# Patient Record
Sex: MALE | Race: WHITE | Employment: OTHER | URBAN - METROPOLITAN AREA
[De-identification: names, ages, dates, MRNs, and addresses within clinical notes are randomized per-mention and may not be internally consistent; named-entity substitution may affect disease eponyms.]

---

## 2017-02-06 ENCOUNTER — PROCEDURE VISIT (OUTPATIENT)
Dept: CARDIOLOGY CLINIC | Age: 77
End: 2017-02-06

## 2017-02-06 ENCOUNTER — OFFICE VISIT (OUTPATIENT)
Dept: CARDIOLOGY CLINIC | Age: 77
End: 2017-02-06

## 2017-02-06 VITALS
WEIGHT: 234 LBS | HEIGHT: 66 IN | SYSTOLIC BLOOD PRESSURE: 132 MMHG | DIASTOLIC BLOOD PRESSURE: 80 MMHG | HEART RATE: 80 BPM | BODY MASS INDEX: 37.61 KG/M2

## 2017-02-06 DIAGNOSIS — Z95.0 CARDIAC PACEMAKER IN SITU: Primary | ICD-10-CM

## 2017-02-06 DIAGNOSIS — I48.0 PAF (PAROXYSMAL ATRIAL FIBRILLATION) (HCC): Primary | ICD-10-CM

## 2017-02-06 PROCEDURE — 99214 OFFICE O/P EST MOD 30 MIN: CPT | Performed by: INTERNAL MEDICINE

## 2017-02-06 PROCEDURE — 93294 REM INTERROG EVL PM/LDLS PM: CPT | Performed by: INTERNAL MEDICINE

## 2017-02-06 PROCEDURE — 93000 ELECTROCARDIOGRAM COMPLETE: CPT | Performed by: INTERNAL MEDICINE

## 2017-02-06 PROCEDURE — 93296 REM INTERROG EVL PM/IDS: CPT | Performed by: INTERNAL MEDICINE

## 2017-02-06 RX ORDER — CYCLOBENZAPRINE HCL 10 MG
10 TABLET ORAL
COMMUNITY
End: 2018-04-27 | Stop reason: ALTCHOICE

## 2017-02-06 RX ORDER — LOSARTAN POTASSIUM 50 MG/1
50 TABLET ORAL DAILY
COMMUNITY

## 2017-02-06 RX ORDER — DEXLANSOPRAZOLE 60 MG/1
60 CAPSULE, DELAYED RELEASE ORAL DAILY
COMMUNITY

## 2017-03-24 ENCOUNTER — HOSPITAL ENCOUNTER (OUTPATIENT)
Dept: GENERAL RADIOLOGY | Age: 77
Discharge: HOME | End: 2017-03-24
Attending: FAMILY MEDICINE | Admitting: FAMILY MEDICINE

## 2017-03-24 LAB
ALBUMIN SERPL-MCNC: 4.2 GM/DL (ref 3.4–5)
ALP BLD-CCNC: 103 IU/L (ref 40–128)
ALT SERPL-CCNC: 26 U/L (ref 10–40)
ANION GAP SERPL CALCULATED.3IONS-SCNC: 10 MMOL/L (ref 4–16)
AST SERPL-CCNC: 29 IU/L (ref 15–37)
BASOPHILS ABSOLUTE: 0 K/CU MM
BASOPHILS RELATIVE PERCENT: 0.3 % (ref 0–1)
BILIRUB SERPL-MCNC: 0.2 MG/DL (ref 0–1)
BUN BLDV-MCNC: 28 MG/DL (ref 6–23)
CALCIUM SERPL-MCNC: 8.7 MG/DL (ref 8.3–10.6)
CHLORIDE BLD-SCNC: 104 MMOL/L (ref 99–110)
CHOLESTEROL: 121 MG/DL
CO2: 27 MMOL/L (ref 21–32)
CREAT SERPL-MCNC: 1.1 MG/DL (ref 0.9–1.3)
DIFFERENTIAL TYPE: ABNORMAL
EOSINOPHILS ABSOLUTE: 0.3 K/CU MM
EOSINOPHILS RELATIVE PERCENT: 4.3 % (ref 0–3)
GFR AFRICAN AMERICAN: >60 ML/MIN/1.73M2
GFR NON-AFRICAN AMERICAN: >60 ML/MIN/1.73M2
GLUCOSE BLD-MCNC: 104 MG/DL (ref 70–140)
HCT VFR BLD CALC: 34.9 % (ref 42–52)
HDLC SERPL-MCNC: 48 MG/DL
HEMOGLOBIN: 10.5 GM/DL (ref 13.5–18)
IMMATURE NEUTROPHIL %: 0.2 % (ref 0–0.43)
LDL CHOLESTEROL DIRECT: 67 MG/DL
LYMPHOCYTES ABSOLUTE: 1.9 K/CU MM
LYMPHOCYTES RELATIVE PERCENT: 29.3 % (ref 24–44)
MAGNESIUM: 2.3 MG/DL (ref 1.8–2.4)
MCH RBC QN AUTO: 26.1 PG (ref 27–31)
MCHC RBC AUTO-ENTMCNC: 30.1 % (ref 32–36)
MCV RBC AUTO: 86.6 FL (ref 78–100)
MONOCYTES ABSOLUTE: 0.6 K/CU MM
MONOCYTES RELATIVE PERCENT: 9.6 % (ref 0–4)
NUCLEATED RBC %: 0 %
PDW BLD-RTO: 16.1 % (ref 11.7–14.9)
PLATELET # BLD: 286 K/CU MM (ref 140–440)
PMV BLD AUTO: 10 FL (ref 7.5–11.1)
POTASSIUM SERPL-SCNC: 4.6 MMOL/L (ref 3.5–5.1)
RBC # BLD: 4.03 M/CU MM (ref 4.6–6.2)
SEGMENTED NEUTROPHILS ABSOLUTE COUNT: 3.7 K/CU MM
SEGMENTED NEUTROPHILS RELATIVE PERCENT: 56.3 % (ref 36–66)
SODIUM BLD-SCNC: 141 MMOL/L (ref 135–145)
TOTAL IMMATURE NEUTOROPHIL: 0.01 K/CU MM
TOTAL NUCLEATED RBC: 0 K/CU MM
TOTAL PROTEIN: 6.9 GM/DL (ref 6.4–8.2)
TRIGL SERPL-MCNC: 72 MG/DL
TSH HIGH SENSITIVITY: 0.93 UIU/ML (ref 0.27–4.2)
WBC # BLD: 6.5 K/CU MM (ref 4–10.5)

## 2017-04-19 ENCOUNTER — HOSPITAL ENCOUNTER (OUTPATIENT)
Dept: GENERAL RADIOLOGY | Age: 77
Discharge: HOME | End: 2017-04-19
Attending: FAMILY MEDICINE | Admitting: FAMILY MEDICINE

## 2017-04-19 LAB
FERRITIN: 21 NG/ML (ref 30–400)
IRON: 39 UG/DL (ref 59–158)
PCT TRANSFERRIN: 9 % (ref 10–44)
TOTAL IRON BINDING CAPACITY: 426 UG/DL (ref 250–450)
UNSATURATED IRON BINDING CAPACITY: 387 UG/DL (ref 110–370)
VITAMIN B-12: 503.3 PG/ML (ref 211–911)

## 2017-05-04 ENCOUNTER — TELEPHONE (OUTPATIENT)
Dept: CARDIOLOGY CLINIC | Age: 77
End: 2017-05-04

## 2017-05-05 ENCOUNTER — HOSPITAL ENCOUNTER (OUTPATIENT)
Dept: GENERAL RADIOLOGY | Age: 77
Discharge: HOME | End: 2017-05-05
Attending: ANESTHESIOLOGY | Admitting: ANESTHESIOLOGY

## 2017-05-05 LAB
ANION GAP SERPL CALCULATED.3IONS-SCNC: 13 MMOL/L (ref 4–16)
BUN BLDV-MCNC: 27 MG/DL (ref 6–23)
CALCIUM SERPL-MCNC: 8.8 MG/DL (ref 8.3–10.6)
CHLORIDE BLD-SCNC: 103 MMOL/L (ref 99–110)
CO2: 25 MMOL/L (ref 21–32)
CREAT SERPL-MCNC: 1.1 MG/DL (ref 0.9–1.3)
GFR AFRICAN AMERICAN: >60 ML/MIN/1.73M2
GFR NON-AFRICAN AMERICAN: >60 ML/MIN/1.73M2
GLUCOSE BLD-MCNC: 135 MG/DL (ref 70–140)
POTASSIUM SERPL-SCNC: 4.2 MMOL/L (ref 3.5–5.1)
SODIUM BLD-SCNC: 141 MMOL/L (ref 135–145)

## 2017-05-09 ENCOUNTER — TELEPHONE (OUTPATIENT)
Dept: CARDIOLOGY CLINIC | Age: 77
End: 2017-05-09

## 2017-05-15 ENCOUNTER — PROCEDURE VISIT (OUTPATIENT)
Dept: CARDIOLOGY CLINIC | Age: 77
End: 2017-05-15

## 2017-05-15 ENCOUNTER — TELEPHONE (OUTPATIENT)
Dept: CARDIOLOGY CLINIC | Age: 77
End: 2017-05-15

## 2017-05-15 DIAGNOSIS — Z95.0 CARDIAC PACEMAKER IN SITU: Primary | ICD-10-CM

## 2017-05-15 PROCEDURE — 93294 REM INTERROG EVL PM/LDLS PM: CPT | Performed by: INTERNAL MEDICINE

## 2017-05-15 PROCEDURE — 93296 REM INTERROG EVL PM/IDS: CPT | Performed by: INTERNAL MEDICINE

## 2017-08-21 ENCOUNTER — PROCEDURE VISIT (OUTPATIENT)
Dept: CARDIOLOGY CLINIC | Age: 77
End: 2017-08-21

## 2017-08-21 DIAGNOSIS — Z95.0 CARDIAC PACEMAKER IN SITU: Primary | ICD-10-CM

## 2017-08-21 PROCEDURE — 93294 REM INTERROG EVL PM/LDLS PM: CPT | Performed by: INTERNAL MEDICINE

## 2017-08-21 PROCEDURE — 93296 REM INTERROG EVL PM/IDS: CPT | Performed by: INTERNAL MEDICINE

## 2017-10-24 ENCOUNTER — OFFICE VISIT (OUTPATIENT)
Dept: CARDIOLOGY CLINIC | Age: 77
End: 2017-10-24

## 2017-10-24 VITALS
HEART RATE: 72 BPM | SYSTOLIC BLOOD PRESSURE: 134 MMHG | BODY MASS INDEX: 35.84 KG/M2 | HEIGHT: 66 IN | WEIGHT: 223 LBS | DIASTOLIC BLOOD PRESSURE: 72 MMHG

## 2017-10-24 DIAGNOSIS — I50.9 CONGESTIVE HEART FAILURE, UNSPECIFIED CONGESTIVE HEART FAILURE CHRONICITY, UNSPECIFIED CONGESTIVE HEART FAILURE TYPE: ICD-10-CM

## 2017-10-24 DIAGNOSIS — I48.0 PAF (PAROXYSMAL ATRIAL FIBRILLATION) (HCC): ICD-10-CM

## 2017-10-24 DIAGNOSIS — Z86.79 S/P ABLATION OF ATRIAL FIBRILLATION: ICD-10-CM

## 2017-10-24 DIAGNOSIS — Z98.890 S/P ABLATION OF ATRIAL FIBRILLATION: ICD-10-CM

## 2017-10-24 DIAGNOSIS — I25.118 CORONARY ARTERY DISEASE OF NATIVE HEART WITH STABLE ANGINA PECTORIS, UNSPECIFIED VESSEL OR LESION TYPE (HCC): ICD-10-CM

## 2017-10-24 DIAGNOSIS — Z95.0 PACEMAKER: ICD-10-CM

## 2017-10-24 DIAGNOSIS — I10 HYPERTENSION, UNSPECIFIED TYPE: ICD-10-CM

## 2017-10-24 DIAGNOSIS — I48.0 PAROXYSMAL ATRIAL FIBRILLATION (HCC): ICD-10-CM

## 2017-10-24 DIAGNOSIS — Z86.79 S/P ABLATION OF ATRIAL FLUTTER: ICD-10-CM

## 2017-10-24 DIAGNOSIS — Z98.890 S/P ABLATION OF ATRIAL FLUTTER: ICD-10-CM

## 2017-10-24 DIAGNOSIS — Z98.61 HISTORY OF PTCA: Primary | ICD-10-CM

## 2017-10-24 DIAGNOSIS — I49.5 SSS (SICK SINUS SYNDROME) (HCC): ICD-10-CM

## 2017-10-24 DIAGNOSIS — R55 SYNCOPE AND COLLAPSE: ICD-10-CM

## 2017-10-24 PROCEDURE — 99214 OFFICE O/P EST MOD 30 MIN: CPT | Performed by: INTERNAL MEDICINE

## 2017-10-24 NOTE — PROGRESS NOTES
CARDIOLOGY  NOTE    Chief Complaint: recurrent syncope    HPI:   Maryan Valadez is a 65y.o. year old who has history as noted below. Patient who follows with Dr Debra Blanco comes for urgent visit due to recurrent falls and syncope. Last event occurred 2 days back , It was unwitnessed he says he has been passing out for years. He says he cannot remember what happens. HE is is more short of breath,. He thinks he passes out. He denies any chest pain but has fluttering feeling in stomach often. Leg and ankle swelling is worse for few weeks now. HE does not feel light headed when he stands up       Current Outpatient Prescriptions   Medication Sig Dispense Refill    rOPINIRole (REQUIP) 0.5 MG tablet Take 1 tablet by mouth daily 90 tablet 3    losartan (COZAAR) 50 MG tablet Take 50 mg by mouth daily       cyclobenzaprine (FLEXERIL) 10 MG tablet Take 10 mg by mouth      dexlansoprazole (DEXILANT) 60 MG CPDR delayed release capsule Take 60 mg by mouth daily      albuterol sulfate  (90 BASE) MCG/ACT inhaler Inhale 2 puffs into the lungs every 6 hours as needed for Wheezing 1 Inhaler 5    Spacer/Aero-Holding Chambers (AEROCHAMBER) MISC Inhale 1 Device into the lungs every 6 hours 1 each 0    zolpidem (AMBIEN) 10 MG tablet Take by mouth nightly as needed for Sleep      furosemide (LASIX) 40 MG tablet Take 1 tablet by mouth 2 times daily 180 tablet 3    dronedarone hcl (MULTAQ) 400 MG TABS Take 400 mg by mouth 2 times daily (with meals)      nitroGLYCERIN (NITROLINGUAL) 0.4 MG/SPRAY 0.4 mg spray Place 1 spray under the tongue every 5 minutes as needed for Chest pain      CPAP Machine MISC by Does not apply route nightly      apixaban (ELIQUIS) 5 MG TABS tablet Take 1 tablet by mouth 2 times daily 60 tablet 3    tadalafil (CIALIS) 5 MG tablet Take 1 tablet by mouth as needed for Erectile Dysfunction 30 tablet 0    rosuvastatin (CRESTOR) 20 MG tablet Take 20 mg by mouth daily.  pramipexole (MIRAPEX) 1 MG tablet   Take 0.5 mg by mouth 2 times daily Patient takes 1/2 tablet in the am and 1 1/2 tablet at night.  metoprolol (LOPRESSOR) 50 MG tablet Take 50 mg by mouth 2 times daily.  modafinil (PROVIGIL) 100 MG tablet Take 100 mg by mouth daily.  esomeprazole (NEXIUM) 40 MG capsule Take 40 mg by mouth 2 times daily.  aspirin 81 MG chewable tablet Take 81 mg by mouth daily.  diclofenac-misoprostol (ARTHROTEC 75) 75-0.2 MG per tablet Take 1 tablet by mouth 2 times daily. No current facility-administered medications for this visit. Allergies:   Ceclor [cefaclor]; Celebrex [celecoxib]; Pcn [penicillins]; and Relafen [nabumetone]    Patient History:  Past Medical History:   Diagnosis Date    Aortic regurgitation 4/3/2012    4/3/2012-mild; 2/7/2011-mild to moderate    Arrhythmia 2/22/2011 2/22/2011-tachycardia episode during cardiac cath-48 HR Holter monitor normal    Blisters of multiple sites 10/5/2016    CAD (coronary artery disease)     sees Dr Allan Trent, prior Dr Antoine Velascoing;   [employer] Cardiac pacemaker 4/2001 4/2001-[employer] PPM Integrity DDD Model 5342    Congestive heart failure ([address]     Family history of coronary artery disease     Father-MI    GERD (gastroesophageal reflux disease)     H/O cardiac catheterization 5/2/2012, 2/22/2011, 3/4/2010, 2/2001 5/2/2012-[facility]-No evid of signif CAD; Normal peripheral angiogram; 2/22/2011-No signif CAD, stent patent, had tachycardic event; 3/4/2010-Severe stenosis and re in-stent stenosis of LAD    H/O cardiovascular stress test 4/16/2012, 2/7/2011 4/16/2012-Normal Lexiscan study. No evid of ischemia. Attenuation artifact in inferior region of myocardium. EF 58%.      H/O cardiovascular stress test 3/4/2015    normal, no ischemia, EF62%    H/O Doppler ultrasound 2/7/2011 2/7/2011-CAROTID US-Normal    H/O Doppler ultrasound 5/24/2013    periphal normal    H/O Doppler ultrasound 6/18/14 6/14 Normal    H/O Doppler ultrasound 11/13/15 09/03/15    11/15 WNL 9/15Arterial and Venous Doppler is normal.     H/O echocardiogram 03/04/15    EF60% Normal LV and systolic function. Trace MR, Aortic, and TR. No other valvulopathy.  H/O echocardiogram 08/08/2016    EF60% mild AR, mild phtn    H/O peptic ulcer 1970s    Hiatal hernia     History of cardiac monitoring 6/1/15    Event - Patient noted to have recurrence of atrial fibrillation with RVR on medical therapy on event monitor    History of Holter monitoring 5/19/15    24 hour - predominant rhythm was sinus intermittent paced, paced rhythm can not be ruled out d/t pacemaker function being turned off during device setup, limited pacemaker data available    History of nuclear stress test 11/10/2016    lexiscan-normal,EF70%    History of PTCA     HX OTHER MEDICAL 5/2/2012 5/2/2012-PERIPHERAL ANGIOGRAM-Normal-Dr Agueda Carroll    HX OTHER MEDICAL 3/2/2011    3/2/2011-48 HR HOLTER MONITOR-Pred Rhythm is sinus rhythm. No arrhythmias.     Hyperlipidemia     Hypertension     S/P angioplasty with stent 3/4/2010, 2/2001    3/4/2010-PTCA with 2.75 X 38 mm stent to LAD; 2/2001-PTCA with stent to LAD    S/P cardiac pacemaker procedure 6/04/13    battery replacement    Sleep apnea     has CPAP     Past Surgical History:   Procedure Laterality Date    ATRIAL ABLATION SURGERY  8/11/15    Atrial fib/flutter ablation     CARDIAC CATHETERIZATION  06/14/2015    no significant disease    CARDIAC PACEMAKER PLACEMENT  4/2001 4/2001-[employer] PPM Integrity DDD Model 5342    CARPAL TUNNEL RELEASE Bilateral 1976    CORONARY ANGIOPLASTY WITH STENT PLACEMENT  3/4/2010, 2/2001    3/4/2010-PTCA with 2.75 X 38 mm stent to LAD; 2/2001-PTCA with stent to LAD;    DIAGNOSTIC CARDIAC CATH LAB PROCEDURE  5/2/2012,2/22/2011, 3/4/2010, 2/2001 5/2/2012-[facility]-No evid of signif CAD; Normal peripheral angiogram; 2/22/2011-No signif CAD, stent patent, had tachycardic event; condition listed as is. Return in about 1 month (around 11/24/2017).

## 2017-10-24 NOTE — LETTER
[place] [facility] [place]     Dr. Fredrick Gonzalez     Tilt Table Procedure     Patient Name: Humberto Bansal   : 1940  MRN# [id number]     Date of Procedure: 10/27/17 Time: 6:30 Arrival Time: 5:45     Hospital: [facility])     Call to [facility] at [phone] 2 days before your procedure    X Please do not have anything by mouth after midnight prior to or 8 hours before  the procedure. X You will need to arrive at the hospital 1 hour prior to the procedure. When you arrive  at the hospital please go to the registration desk. X You will need to arrange for someone to drive you home after the procedure. X Please wear comfortable clothing for the procedure. X If you are taking Lasix (furosemide) please do not take  it the morning before your  procedure. Patient Signature:____________________________ Staff Signature: Adalid Gonzalez      Tilt Table Test   A tilt table test is used to check people who have fainted or who often feel lightheaded. The results help your doctor know the cause of your fainting or feeling lightheaded. The test uses a special table that slowly tilts you to an upright position. It checks how your body responds when you change positions. The test will take about an hour. It may take longer if you get medicine to speed up your heart during the test.  Why is this test done? This test checks what causes your symptoms by monitoring them while changing your position. Your doctor can see if you faint or feel lightheaded because of problems with your heart rate or blood pressure. When people move from a lying position to an upright one, their blood pressure normally drops. But the body adjusts to this. Your nervous system senses changes in body position and controls your heart rate and blood pressure. If the nervous system doesn't work properly, you might feel lightheaded or faint. This can happen if your blood pressure stays too low. Your heart rate also may slow down or speed up. You feel lightheaded because your brain is not getting a normal amount of blood for a short time. This problem is called syncope. Syncope might happen during the test when you change to an upright position. During the Test: The test is usually done in a hospital. You will have small patches or pads attached to your skin. These are sensors that monitor your heart. You will also have a blood pressure cuff on your arm. And you may have an IV. You will lie flat on a table that can tilt you up to almost a standing position. You will be strapped securely to the table. Your heart rate and blood pressure are checked regularly as the table is tilted up. You will be asked if you feel any symptoms like nausea, sweating, dizziness, or an abnormal heartbeat. If you don't have any symptoms, you may be given medicine to speed up your heart rate. Then you will be checked for symptoms again. If you faint during the test, the table will be returned to a flat position. You will be checked closely and taken care of right away by your medical team. Most people wake up right away. Results of the test: The test result is normal if your blood pressure stays stable during the test and you do not feel lightheaded or faint. The test result is not normal if your blood pressure drops and you feel lightheaded or faint. These symptoms might happen because of a slow heart rate. After the Test: Your heart rate and blood pressure will be checked before you go home. You may need to have someone drive you home after the test. You can probably go back to your usual activities right away. But some people feel a little tired or nauseated  Follow-up care is a key part of your treatment and safety.  Be sure to make and go to all appointments, and call your doctor if you are having problems. It's also a good idea to keep a list of the medicines you take. Ask your doctor when you can expect to have your test results. [place] [facility] [place]      [address] [facility]/CARDIOLOGY            Patient Name: Yocasta Wolfe   : 1940  MRN# [id number]    TODAYS DATE: 10/24/17    DATE OF PROCEDURE: 10/27/17      DX: syncope             Tilt Table Procedure           X BMP        X MAGNESIUM                 PHYSICIANS SIGNATURE________________________DATE/TIME___________________                                       [facility]) Informed Consent for Anesthesia/Sedation, Surgery, Invasive Procedures, and other High-risk Interventions and Medication use      *This consent is applicable for 30 days following patient signature*    Procedure(s)   I, Yocasta Wolfe authorize, Dr. Gabo Lyon    and the associate(s) or assistant(s) of his/her choice, to perform the following procedure(s):Tilt Table Procedure    I know that unexpected conditions may require additional or different procedures than those above. I authorize the above named practitioner(s) perform these as necessary and desirable. This is based on the practitioners professional judgment. The above named practitioner has discussed the above procedure(s) with me, including:  ? Potential benefits, including likelihood of success of the procedure(s) goals  ? Risks  ? Side effects, risk of death, and risk of infection  ? Any potential problems that might occur during recuperation or healing post-procedure  ? Reasonable alternatives  ? Risks of NOT performing the procedure(s)    I acknowledge that no warranty or guarantee has been made to the results the procedure(s).      I consent to the above named practitioner(s) providing additional services to me as deemed reasonable and necessary, including but not limited to: ? Use of medications for anesthesia or sedation. ? All anesthesia and sedation carry risks. My practitioner has discussed my anticipated anesthesia and/or sedation and the risks of using, risk of not using, benefits, side effects, and alternatives. ? Use of pathology  ? I authorize [facility] ([facility]) to dispose of tissues, specimens or organs when pathology is complete. ? Use of radiology  ? A contrast agent may be required for radiology procedures. My practitioner has advised me of the risks of using, risks of not using, benefits, side effects, and alternatives. ? Observers or use of photography, video/audio recording, or televising of the procedure(s). This is for medical, scientific, or educational purposes. This includes appropriate portions of my body. My identity will not be revealed. ? I consent to release of my social security number and other identifying information to [employer] (FDA), and the supplier/, if I receive tissue, a device, or implant. This is to track the tissue, device, or implant for defect, recall, infection, etc.     ? Use of blood and/or blood products, if needed, through my hospital stay. My practitioner has advised me of the risks of using, risks of not using, benefits, side effects, and alternatives. ___ I do NOT want Blood or Blood products given. (Complete separate  refusal form)    Code Status (jhon one):  ___ I do NOT HAVE a DNR order. I am a Full-code.   I will receive CPR, intubation,  chest compressions, medications, and/or other life saving measures if I have a  cardiac or respiratory arrest.    ___ I have a Do Not Resuscitate (DNR)order.   (jhon one below)  ___  I rescind my DNR for surgery and immediate post-operative period through Phase 2 recovery.    This means, for that time period, I will be a Full-code and receive CPR, intubation, chest compressions, medications, and/or other life saving measures, if I have a cardiac or respiratory arrest.    ___ I WANT to keep my DNR in effect during my procedure(s) and immediate post-operative recovery period through Phase 2 recovery. (Complete separate refusal form)     This form has been fully explained to me. I understand its contents. Patients Signature: ___________________________Date: ________  Time: ________    If patient unable to sign, has engaged the [address] [place] [address], is a minor, or has a court-appointed Guardian:  [facility] Representative Name (Print):  ____________________________________      Relationship ([place] one):    Guardian   Parent    Spouse    HCPOA   Child   Sibling  Next-of-Kin Friend    Patients Representative Signature: _______________________________________              Date: ______________  Time: __________    An  was used.  name/ID: _________________________________      [facility] ([facility]) Witness________________________  Date: ________   Time: _________    Physician/Practitioner _______________________  Date: ________   Time: _________           Revision 2017    Darryle Cocks Dr. Linard Christian     PROCEDURE TO SCHEDULE:    Tilt Table Procedure    Patient Name: Bharti Jeffery   : 1940   MRN# [id number]  Home Phone Number: [phone]   Weight:    Wt Readings from Last 3 Encounters:   10/24/17 223 lb (101.2 kg)   17 219 lb (99.3 kg)   17 225 lb (102.1 kg)        Insurance: Payor: Manuela Kim / Plan: Juanito March PPO / Product Type: Medicare /     Date of Procedure: 10/27/17 Time: 6:30 Arrival Time: 5:45    Diagnosis:  syncope  Allergies:    Allergies   Allergen Reactions    Ceclor [Cefaclor] Rash    Celebrex [Celecoxib] Rash    Pcn [Penicillins] Rash    Relafen [Nabumetone] Rash          1) Call [facility] scheduling ([phone]) or [address] [facility]     PHONE OR   INSTANT MESSAGE

## 2017-10-27 ENCOUNTER — TELEPHONE (OUTPATIENT)
Dept: CARDIOLOGY CLINIC | Age: 77
End: 2017-10-27

## 2017-10-30 ENCOUNTER — HOSPITAL ENCOUNTER (OUTPATIENT)
Dept: CARDIOLOGY | Age: 77
Discharge: HOME | End: 2017-10-30
Attending: INTERNAL MEDICINE | Admitting: INTERNAL MEDICINE

## 2017-10-30 VITALS
DIASTOLIC BLOOD PRESSURE: 74 MMHG | TEMPERATURE: 98.3 F | RESPIRATION RATE: 19 BRPM | OXYGEN SATURATION: 93 % | SYSTOLIC BLOOD PRESSURE: 148 MMHG | HEART RATE: 128 BPM

## 2017-10-30 LAB
ANION GAP SERPL CALCULATED.3IONS-SCNC: 10 MMOL/L (ref 4–16)
BUN BLDV-MCNC: 34 MG/DL (ref 6–23)
CALCIUM SERPL-MCNC: 8.9 MG/DL (ref 8.3–10.6)
CHLORIDE BLD-SCNC: 104 MMOL/L (ref 99–110)
CO2: 29 MMOL/L (ref 21–32)
CREAT SERPL-MCNC: 1.3 MG/DL (ref 0.9–1.3)
GFR AFRICAN AMERICAN: >60 ML/MIN/1.73M2
GFR NON-AFRICAN AMERICAN: 53 ML/MIN/1.73M2
GLUCOSE FASTING: 116 MG/DL (ref 70–99)
MAGNESIUM: 2.3 MG/DL (ref 1.8–2.4)
POTASSIUM SERPL-SCNC: 4.9 MMOL/L (ref 3.5–5.1)
SODIUM BLD-SCNC: 143 MMOL/L (ref 135–145)

## 2017-10-30 RX ORDER — METAXALONE 800 MG/1
800 TABLET ORAL 2 TIMES DAILY
COMMUNITY
End: 2018-09-14 | Stop reason: ALTCHOICE

## 2017-10-30 NOTE — PROGRESS NOTES
Take 81 mg by mouth daily. Yes Historical Provider, MD   diclofenac-misoprostol (ARTHROTEC 75) 75-0.2 MG per tablet Take 1 tablet by mouth 2 times daily. Yes Historical Provider, MD   rOPINIRole (REQUIP) 0.5 MG tablet Take 1 tablet by mouth daily  Candice Orozco MD   losartan (COZAAR) 50 MG tablet Take 50 mg by mouth daily   Historical Provider, MD   cyclobenzaprine (FLEXERIL) 10 MG tablet Take 10 mg by mouth  Historical Provider, MD   Spacer/Aero-Holding Chambers (AEROCHAMBER) MISC Inhale 1 Device into the lungs every 6 hours  Candice Orozco MD   nitroGLYCERIN (NITROLINGUAL) 0.4 MG/SPRAY 0.4 mg spray Place 1 spray under the tongue every 5 minutes as needed for Chest pain  Historical Provider, MD         Past Medical History:   Diagnosis Date    Aortic regurgitation 4/3/2012    4/3/2012-mild; 2/7/2011-mild to moderate    Arrhythmia 2/22/2011 2/22/2011-tachycardia episode during cardiac cath-48 HR Holter monitor normal    Blisters of multiple sites 10/5/2016    CAD (coronary artery disease)     sees Dr Cortney Meléndez, prior Dr Jared Max;   [facility] Cardiac pacemaker 4/2001 4/2001-[employer] PPM Integrity DDD Model 5342    Congestive heart failure ([address]     Family history of coronary artery disease     Father-MI    GERD (gastroesophageal reflux disease)     H/O cardiac catheterization 5/2/2012, 2/22/2011, 3/4/2010, 2/2001 5/2/2012-[facility]-No evid of signif CAD; Normal peripheral angiogram; 2/22/2011-No signif CAD, stent patent, had tachycardic event; 3/4/2010-Severe stenosis and re in-stent stenosis of LAD    H/O cardiovascular stress test 4/16/2012, 2/7/2011 4/16/2012-Normal Lexiscan study. No evid of ischemia. Attenuation artifact in inferior region of myocardium. EF 58%.      H/O cardiovascular stress test 3/4/2015    normal, no ischemia, EF62%    H/O Doppler ultrasound 2/7/2011 2/7/2011-CAROTID US-Normal    H/O Doppler ultrasound 5/24/2013    periphal normal    H/O Doppler ultrasound 6/18/14 6/14 Normal    H/O Doppler ultrasound 11/13/15 09/03/15    11/15 WNL 9/15Arterial and Venous Doppler is normal.     H/O echocardiogram 03/04/15    EF60% Normal LV and systolic function. Trace MR, Aortic, and TR. No other valvulopathy.  H/O echocardiogram 08/08/2016    EF60% mild AR, mild phtn    H/O peptic ulcer 1970s    Hiatal hernia     History of cardiac monitoring 6/1/15    Event - Patient noted to have recurrence of atrial fibrillation with RVR on medical therapy on event monitor    History of Holter monitoring 5/19/15    24 hour - predominant rhythm was sinus intermittent paced, paced rhythm can not be ruled out d/t pacemaker function being turned off during device setup, limited pacemaker data available    History of nuclear stress test 11/10/2016    lexiscan-normal,EF70%    History of PTCA     HX OTHER MEDICAL 5/2/2012 5/2/2012-PERIPHERAL ANGIOGRAM-Normal-Dr Mary Jane Armas    HX OTHER MEDICAL 3/2/2011    3/2/2011-48 HR HOLTER MONITOR-Pred Rhythm is sinus rhythm. No arrhythmias.     Hyperlipidemia     Hypertension     S/P angioplasty with stent 3/4/2010, 2/2001    3/4/2010-PTCA with 2.75 X 38 mm stent to LAD; 2/2001-PTCA with stent to LAD    S/P cardiac pacemaker procedure 6/04/13    battery replacement    Sleep apnea     has CPAP       Past Surgical History:   Procedure Laterality Date    ATRIAL ABLATION SURGERY  8/11/15    Atrial fib/flutter ablation     CARDIAC CATHETERIZATION  06/14/2015    no significant disease    CARDIAC PACEMAKER PLACEMENT  4/2001 4/2001-[employer] PPM Integrity DDD Model 5342    CARPAL TUNNEL RELEASE Bilateral 1976    CORONARY ANGIOPLASTY WITH STENT PLACEMENT  3/4/2010, 2/2001    3/4/2010-PTCA with 2.75 X 38 mm stent to LAD; 2/2001-PTCA with stent to LAD;    DIAGNOSTIC CARDIAC CATH LAB PROCEDURE  5/2/2012,2/22/2011, 3/4/2010, 2/2001 5/2/2012-[facility]-No evid of signif CAD; Normal peripheral angiogram; 2/22/2011-No signif CAD, stent patent, had tachycardic event;

## 2017-11-01 ENCOUNTER — PROCEDURE VISIT (OUTPATIENT)
Dept: CARDIOLOGY CLINIC | Age: 77
End: 2017-11-01

## 2017-11-01 DIAGNOSIS — R06.02 SOB (SHORTNESS OF BREATH): Primary | ICD-10-CM

## 2017-11-01 DIAGNOSIS — I25.118 CORONARY ARTERY DISEASE OF NATIVE HEART WITH STABLE ANGINA PECTORIS, UNSPECIFIED VESSEL OR LESION TYPE (HCC): ICD-10-CM

## 2017-11-01 DIAGNOSIS — Z98.890 S/P ABLATION OF ATRIAL FLUTTER: ICD-10-CM

## 2017-11-01 DIAGNOSIS — Z86.79 S/P ABLATION OF ATRIAL FLUTTER: ICD-10-CM

## 2017-11-01 DIAGNOSIS — I10 HYPERTENSION, UNSPECIFIED TYPE: ICD-10-CM

## 2017-11-01 DIAGNOSIS — I48.0 PAF (PAROXYSMAL ATRIAL FIBRILLATION) (HCC): ICD-10-CM

## 2017-11-01 DIAGNOSIS — I50.9 CONGESTIVE HEART FAILURE, UNSPECIFIED CONGESTIVE HEART FAILURE CHRONICITY, UNSPECIFIED CONGESTIVE HEART FAILURE TYPE: ICD-10-CM

## 2017-11-01 DIAGNOSIS — Z95.0 PACEMAKER: ICD-10-CM

## 2017-11-01 DIAGNOSIS — Z98.61 HISTORY OF PTCA: ICD-10-CM

## 2017-11-01 DIAGNOSIS — I49.5 SSS (SICK SINUS SYNDROME) (HCC): ICD-10-CM

## 2017-11-01 DIAGNOSIS — I48.0 PAROXYSMAL ATRIAL FIBRILLATION (HCC): ICD-10-CM

## 2017-11-01 LAB
LV EF: 58 %
LVEF MODALITY: NORMAL

## 2017-11-01 PROCEDURE — 93306 TTE W/DOPPLER COMPLETE: CPT | Performed by: INTERNAL MEDICINE

## 2017-11-02 ENCOUNTER — TELEPHONE (OUTPATIENT)
Dept: CARDIOLOGY CLINIC | Age: 77
End: 2017-11-02

## 2017-11-02 NOTE — TELEPHONE ENCOUNTER
Left ventricular systolic function is normal with an ejection fraction of   55-60%.  Grade I diastolic dysfunction.   The left atrium is mildly dilated.   Mildly sclerotic , but non-stenotic aortic valve.   Mild aortic regurgitation.   No other significant valvulopathy seen.   Right ventricular systolic pressure of 40 mmHg consistent with mild   pulmonary hypertension.   No evidence of pericardial effusion.     Results to patient

## 2017-11-27 ENCOUNTER — TELEPHONE (OUTPATIENT)
Dept: CARDIOLOGY CLINIC | Age: 77
End: 2017-11-27

## 2017-11-27 ENCOUNTER — PROCEDURE VISIT (OUTPATIENT)
Dept: CARDIOLOGY CLINIC | Age: 77
End: 2017-11-27

## 2017-11-27 DIAGNOSIS — Z95.0 CARDIAC PACEMAKER IN SITU: Primary | ICD-10-CM

## 2017-11-27 PROCEDURE — 93296 REM INTERROG EVL PM/IDS: CPT | Performed by: INTERNAL MEDICINE

## 2017-11-27 PROCEDURE — 93294 REM INTERROG EVL PM/LDLS PM: CPT | Performed by: INTERNAL MEDICINE

## 2017-11-27 NOTE — LETTER
Cardiology [address] [address]  Phone: [phone]  Fax: [phone]            November 27, 2017    [address]      Dear Gustavo Ty: This is your CAREL[employer] schedule. Please jhon your calendar with these dates. You can do your checks anytime during the scheduled day. Since we do not do reminder calls, it will be your responsibility to perform the checks on the day it is scheduled. If you have any questions or concerns, please call and ask for Etelvina Alonso  at [phone].

## 2017-11-30 ENCOUNTER — OFFICE VISIT (OUTPATIENT)
Dept: CARDIOLOGY CLINIC | Age: 77
End: 2017-11-30

## 2017-11-30 VITALS
DIASTOLIC BLOOD PRESSURE: 70 MMHG | HEIGHT: 66 IN | BODY MASS INDEX: 34.97 KG/M2 | WEIGHT: 217.6 LBS | HEART RATE: 80 BPM | SYSTOLIC BLOOD PRESSURE: 140 MMHG

## 2017-11-30 DIAGNOSIS — I25.118 CORONARY ARTERY DISEASE OF NATIVE HEART WITH STABLE ANGINA PECTORIS, UNSPECIFIED VESSEL OR LESION TYPE (HCC): ICD-10-CM

## 2017-11-30 DIAGNOSIS — Z98.61 HISTORY OF PTCA: Primary | ICD-10-CM

## 2017-11-30 PROCEDURE — 99214 OFFICE O/P EST MOD 30 MIN: CPT | Performed by: INTERNAL MEDICINE

## 2017-11-30 NOTE — PROGRESS NOTES
CARDIOLOGY NOTE      11/30/2017    RE: Sophia Velazquez  (1940)                               TO:  Dr. Haleigh Pope MD      Thank you for involving me in taking care of your  patient Sophia Velazquez, who is a  65y.o. year old      male with past medical  history of  Cad,htn,hyperlipidimea, ppm, a fib  is  seen today Patient  during this  visit c/o severe exertional sob for  Afew weeks, no CP. Has been having frq falls aswell. Here for fu on tilt test    Vitals:    11/30/17 1652   BP: (!) 140/70   Pulse:        Current Outpatient Prescriptions   Medication Sig Dispense Refill    metaxalone (SKELAXIN) 800 MG tablet Take 800 mg by mouth 2 times daily      rOPINIRole (REQUIP) 0.5 MG tablet Take 1 tablet by mouth daily 90 tablet 3    losartan (COZAAR) 50 MG tablet Take 50 mg by mouth daily       cyclobenzaprine (FLEXERIL) 10 MG tablet Take 10 mg by mouth      dexlansoprazole (DEXILANT) 60 MG CPDR delayed release capsule Take 60 mg by mouth daily      albuterol sulfate  (90 BASE) MCG/ACT inhaler Inhale 2 puffs into the lungs every 6 hours as needed for Wheezing 1 Inhaler 5    Spacer/Aero-Holding Chambers (AEROCHAMBER) MISC Inhale 1 Device into the lungs every 6 hours 1 each 0    zolpidem (AMBIEN) 10 MG tablet Take by mouth nightly as needed for Sleep      furosemide (LASIX) 40 MG tablet Take 1 tablet by mouth 2 times daily 180 tablet 3    dronedarone hcl (MULTAQ) 400 MG TABS Take 400 mg by mouth 2 times daily (with meals)      nitroGLYCERIN (NITROLINGUAL) 0.4 MG/SPRAY 0.4 mg spray Place 1 spray under the tongue every 5 minutes as needed for Chest pain      CPAP Machine MISC by Does not apply route nightly      apixaban (ELIQUIS) 5 MG TABS tablet Take 1 tablet by mouth 2 times daily 60 tablet 3    tadalafil (CIALIS) 5 MG tablet Take 1 tablet by mouth as needed for Erectile Dysfunction 30 tablet 0    rosuvastatin (CRESTOR) 20 MG tablet Take 20 mg by mouth daily.       pramipexole (MIRAPEX) 1 MG tablet   Take 0.5 mg by mouth 2 times daily Patient takes 1/2 tablet in the am and 1 1/2 tablet at night.  metoprolol (LOPRESSOR) 50 MG tablet Take 50 mg by mouth 2 times daily.  modafinil (PROVIGIL) 100 MG tablet Take 100 mg by mouth daily.  esomeprazole (NEXIUM) 40 MG capsule Take 40 mg by mouth 2 times daily.  aspirin 81 MG chewable tablet Take 81 mg by mouth daily.  diclofenac-misoprostol (ARTHROTEC 75) 75-0.2 MG per tablet Take 1 tablet by mouth 2 times daily. No current facility-administered medications for this visit. Allergies: Ceclor [cefaclor]; Celebrex [celecoxib]; Pcn [penicillins]; and Relafen [nabumetone]  Past Medical History:   Diagnosis Date    Aortic regurgitation 4/3/2012    4/3/2012-mild; 2/7/2011-mild to moderate    Arrhythmia 2/22/2011 2/22/2011-tachycardia episode during cardiac cath-48 HR Holter monitor normal    Blisters of multiple sites 10/5/2016    CAD (coronary artery disease)     sees Dr Kyle Lang, prior Dr Daniel Renteria;   Ludin Samuels Cardiac pacemaker 4/2001 4/2001-[employer] PPM Integrity DDD Model 5342    Congestive heart failure [facility])     Family history of coronary artery disease     Father-MI    GERD (gastroesophageal reflux disease)     H/O cardiac catheterization 5/2/2012, 2/22/2011, 3/4/2010, 2/2001 5/2/2012-[facility]-No evid of signif CAD; Normal peripheral angiogram; 2/22/2011-No signif CAD, stent patent, had tachycardic event; 3/4/2010-Severe stenosis and re in-stent stenosis of LAD    H/O cardiovascular stress test 4/16/2012, 2/7/2011 4/16/2012-Normal Lexiscan study. No evid of ischemia. Attenuation artifact in inferior region of myocardium. EF 58%.      H/O cardiovascular stress test 3/4/2015    normal, no ischemia, EF62%    H/O Doppler ultrasound 2/7/2011 2/7/2011-CAROTID US-Normal    H/O Doppler ultrasound 5/24/2013    periphal normal    H/O Doppler ultrasound 6/18/14 6/14 Normal    H/O Doppler ultrasound 11/13/15 09/03/15    11/15 WNL 9/15Arterial and Venous Doppler is normal.     H/O echocardiogram 03/04/15    EF60% Normal LV and systolic function. Trace MR, Aortic, and TR. No other valvulopathy.  H/O echocardiogram 08/08/2016    EF60% mild AR, mild phtn    H/O echocardiogram 11/01/2017    EF55-60% sclerotic, non stenotic AV, mild AR, phtn    H/O peptic ulcer 1970s    Hiatal hernia     History of cardiac monitoring 6/1/15    Event - Patient noted to have recurrence of atrial fibrillation with RVR on medical therapy on event monitor    History of Holter monitoring 5/19/15    24 hour - predominant rhythm was sinus intermittent paced, paced rhythm can not be ruled out d/t pacemaker function being turned off during device setup, limited pacemaker data available    History of nuclear stress test 11/10/2016    lexiscan-normal,EF70%    History of PTCA     HX OTHER MEDICAL 5/2/2012 5/2/2012-PERIPHERAL ANGIOGRAM-Normal-Dr France Avery    HX OTHER MEDICAL 3/2/2011    3/2/2011-48 HR HOLTER MONITOR-Pred Rhythm is sinus rhythm. No arrhythmias.     Hyperlipidemia     Hypertension     S/P angioplasty with stent 3/4/2010, 2/2001    3/4/2010-PTCA with 2.75 X 38 mm stent to LAD; 2/2001-PTCA with stent to LAD    S/P cardiac pacemaker procedure 6/04/13    battery replacement    Sleep apnea     has CPAP     Past Surgical History:   Procedure Laterality Date    ATRIAL ABLATION SURGERY  8/11/15    Atrial fib/flutter ablation     CARDIAC CATHETERIZATION  06/14/2015    no significant disease    CARDIAC PACEMAKER PLACEMENT  4/2001 4/2001-[employer] PPM Integrity DDD Model 5342    CARPAL TUNNEL RELEASE Bilateral 1976    CORONARY ANGIOPLASTY WITH STENT PLACEMENT  3/4/2010, 2/2001    3/4/2010-PTCA with 2.75 X 38 mm stent to LAD; 2/2001-PTCA with stent to LAD;    DIAGNOSTIC CARDIAC CATH LAB PROCEDURE  5/2/2012,2/22/2011, 3/4/2010, 2/2001 5/2/2012-[facility]-No evid of signif CAD; Normal peripheral angiogram; 2/22/2011-No signif CAD, stent patent, had tachycardic event; 3/4/2010-Severe stenosis and re in-stent stenosis of LAD    FINGER TRIGGER RELEASE Left 1994    thumb    HAND TENDON SURGERY Left 1976    Left middle finger    HERNIA REPAIR Bilateral 1993    bilateral inguinal hernias    JOINT REPLACEMENT      knees bilaterally-Right 10/2009; 1/2008-Left    KNEE ARTHROSCOPY  11/2005, 1989 11/2005, 1989-Right knee    LUMBAR DISC ARTHROPLASTY  07/08/2016    OTHER SURGICAL HISTORY  5/2/2012 5/2/2012-PERIPHERAL ANGIOGRAM-Normal    UPPER GASTROINTESTINAL ENDOSCOPY  5/3/2001    5/3/4912-[facility])     Family History   Problem Relation Age of Onset    Heart Disease Mother     High Blood Pressure Mother     Stroke Father     Coronary Art Dis Father      MI    Other Brother      accidental    Brain Cancer Paternal Grandfather      Social History   Substance Use Topics    Smoking status: Never Smoker    Smokeless tobacco: Never Used    Alcohol use No          Review of systems:  HEENT: Neg  Card:SOB   GI;Neg  : Neg  Neuro: Neg  Psych: Neg  Derm: Neg  MS; Neg  All: Documented  Constitutional: Neg    Objective:      Physical Exam:  BP (!) 140/70   Pulse 80   Ht 5' 6\" (1.676 m)   Wt 217 lb 9.6 oz (98.7 kg)   BMI 35.12 kg/m²   Wt Readings from Last 3 Encounters:   11/30/17 217 lb 9.6 oz (98.7 kg)   10/24/17 223 lb (101.2 kg)   09/21/17 219 lb (99.3 kg)     Body mass index is 35.12 kg/m². GENERAL - Alert, oriented, pleasant, in no apparent distress. Head unremarkable  Eyes  Not injected conjunctiva  ENT  normal mucosa  Neck - Supple. No jugular venous distention noted. No carotid bruits. Cardiovascular  Normal S1 and S2 without obvious murmur or gallop. Extremities - No cyanosis, clubbing, or significant edema. Pulmonary  No respiratory distress. No wheezes or rales.     Pulses: Bilateral radial and pedal pulses normal  Abdomen  no tenderness  Musculoskeletal  normal strength  Neurologic 

## 2017-11-30 NOTE — PATIENT INSTRUCTIONS
Please remember to bring all medication bottles or a medication list with you to your appointment. If you have any questions, please call our office at [phone].

## 2017-12-05 ENCOUNTER — TELEPHONE (OUTPATIENT)
Dept: CARDIOLOGY CLINIC | Age: 77
End: 2017-12-05

## 2017-12-06 ENCOUNTER — PROCEDURE VISIT (OUTPATIENT)
Dept: CARDIOLOGY CLINIC | Age: 77
End: 2017-12-06

## 2017-12-06 DIAGNOSIS — R55 SYNCOPE AND COLLAPSE: Primary | ICD-10-CM

## 2017-12-06 DIAGNOSIS — Z98.61 HISTORY OF PTCA: ICD-10-CM

## 2017-12-06 DIAGNOSIS — I25.118 CORONARY ARTERY DISEASE OF NATIVE HEART WITH STABLE ANGINA PECTORIS, UNSPECIFIED VESSEL OR LESION TYPE (HCC): ICD-10-CM

## 2017-12-06 LAB
LV EF: 60 %
LVEF MODALITY: NORMAL

## 2017-12-06 PROCEDURE — 93018 CV STRESS TEST I&R ONLY: CPT | Performed by: INTERNAL MEDICINE

## 2017-12-06 PROCEDURE — 93016 CV STRESS TEST SUPVJ ONLY: CPT | Performed by: INTERNAL MEDICINE

## 2017-12-06 PROCEDURE — 78452 HT MUSCLE IMAGE SPECT MULT: CPT | Performed by: INTERNAL MEDICINE

## 2017-12-06 PROCEDURE — 93017 CV STRESS TEST TRACING ONLY: CPT | Performed by: INTERNAL MEDICINE

## 2017-12-06 PROCEDURE — A9500 TC99M SESTAMIBI: HCPCS | Performed by: INTERNAL MEDICINE

## 2017-12-08 ENCOUNTER — TELEPHONE (OUTPATIENT)
Dept: CARDIOLOGY CLINIC | Age: 77
End: 2017-12-08

## 2018-03-05 ENCOUNTER — TELEPHONE (OUTPATIENT)
Dept: CARDIOLOGY CLINIC | Age: 78
End: 2018-03-05

## 2018-03-06 ENCOUNTER — PROCEDURE VISIT (OUTPATIENT)
Dept: CARDIOLOGY CLINIC | Age: 78
End: 2018-03-06

## 2018-03-06 DIAGNOSIS — Z95.0 CARDIAC PACEMAKER IN SITU: Primary | ICD-10-CM

## 2018-03-06 PROCEDURE — 93296 REM INTERROG EVL PM/IDS: CPT | Performed by: INTERNAL MEDICINE

## 2018-03-06 PROCEDURE — 93294 REM INTERROG EVL PM/LDLS PM: CPT | Performed by: INTERNAL MEDICINE

## 2018-04-27 ENCOUNTER — OFFICE VISIT (OUTPATIENT)
Dept: CARDIOLOGY CLINIC | Age: 78
End: 2018-04-27

## 2018-04-27 VITALS
SYSTOLIC BLOOD PRESSURE: 150 MMHG | HEIGHT: 66 IN | WEIGHT: 220.8 LBS | HEART RATE: 80 BPM | BODY MASS INDEX: 35.48 KG/M2 | DIASTOLIC BLOOD PRESSURE: 70 MMHG

## 2018-04-27 DIAGNOSIS — Z98.61 HISTORY OF PTCA: Primary | ICD-10-CM

## 2018-04-27 DIAGNOSIS — Z95.0 CARDIAC PACEMAKER IN SITU: ICD-10-CM

## 2018-04-27 PROCEDURE — G8598 ASA/ANTIPLAT THER USED: HCPCS | Performed by: INTERNAL MEDICINE

## 2018-04-27 PROCEDURE — 4040F PNEUMOC VAC/ADMIN/RCVD: CPT | Performed by: INTERNAL MEDICINE

## 2018-04-27 PROCEDURE — 1123F ACP DISCUSS/DSCN MKR DOCD: CPT | Performed by: INTERNAL MEDICINE

## 2018-04-27 PROCEDURE — 1036F TOBACCO NON-USER: CPT | Performed by: INTERNAL MEDICINE

## 2018-04-27 PROCEDURE — G8417 CALC BMI ABV UP PARAM F/U: HCPCS | Performed by: INTERNAL MEDICINE

## 2018-04-27 PROCEDURE — G8427 DOCREV CUR MEDS BY ELIG CLIN: HCPCS | Performed by: INTERNAL MEDICINE

## 2018-04-27 PROCEDURE — 99213 OFFICE O/P EST LOW 20 MIN: CPT | Performed by: INTERNAL MEDICINE

## 2018-06-13 ENCOUNTER — PROCEDURE VISIT (OUTPATIENT)
Dept: CARDIOLOGY CLINIC | Age: 78
End: 2018-06-13

## 2018-06-13 DIAGNOSIS — Z95.0 CARDIAC PACEMAKER IN SITU: Primary | ICD-10-CM

## 2018-06-13 PROCEDURE — 93294 REM INTERROG EVL PM/LDLS PM: CPT | Performed by: INTERNAL MEDICINE

## 2018-06-13 PROCEDURE — 93296 REM INTERROG EVL PM/IDS: CPT | Performed by: INTERNAL MEDICINE

## 2018-09-13 ENCOUNTER — TELEPHONE (OUTPATIENT)
Dept: CARDIOLOGY CLINIC | Age: 78
End: 2018-09-13

## 2018-09-14 ENCOUNTER — OFFICE VISIT (OUTPATIENT)
Dept: CARDIOLOGY CLINIC | Age: 78
End: 2018-09-14

## 2018-09-14 VITALS — HEART RATE: 80 BPM | DIASTOLIC BLOOD PRESSURE: 70 MMHG | SYSTOLIC BLOOD PRESSURE: 136 MMHG

## 2018-09-14 DIAGNOSIS — Z95.0 CARDIAC PACEMAKER IN SITU: ICD-10-CM

## 2018-09-14 DIAGNOSIS — Z98.61 HISTORY OF PTCA: Primary | ICD-10-CM

## 2018-09-14 PROCEDURE — G8598 ASA/ANTIPLAT THER USED: HCPCS | Performed by: INTERNAL MEDICINE

## 2018-09-14 PROCEDURE — G8417 CALC BMI ABV UP PARAM F/U: HCPCS | Performed by: INTERNAL MEDICINE

## 2018-09-14 PROCEDURE — G8427 DOCREV CUR MEDS BY ELIG CLIN: HCPCS | Performed by: INTERNAL MEDICINE

## 2018-09-14 PROCEDURE — 1101F PT FALLS ASSESS-DOCD LE1/YR: CPT | Performed by: INTERNAL MEDICINE

## 2018-09-14 PROCEDURE — 1036F TOBACCO NON-USER: CPT | Performed by: INTERNAL MEDICINE

## 2018-09-14 PROCEDURE — 99213 OFFICE O/P EST LOW 20 MIN: CPT | Performed by: INTERNAL MEDICINE

## 2018-09-14 PROCEDURE — 1123F ACP DISCUSS/DSCN MKR DOCD: CPT | Performed by: INTERNAL MEDICINE

## 2018-09-14 PROCEDURE — 4040F PNEUMOC VAC/ADMIN/RCVD: CPT | Performed by: INTERNAL MEDICINE

## 2018-09-14 RX ORDER — TIZANIDINE 4 MG/1
TABLET ORAL
Refills: 2 | COMMUNITY
Start: 2018-08-31

## 2018-09-14 NOTE — PROGRESS NOTES
CARDIOLOGY NOTE      9/14/2018    RE: Evita Caldwell  (1940)                               TO:  Dr. Emilio Melara MD      Thank you for involving me in taking care of your  patient Evita Caldwell, who is a  77y.o. year old      male with past medical  history of  CAD, HTN, hyperlipidimea, A Fib, PPM  is  seen today Patient  during this  visit  Still c/o moderate exertional SOB with cough for a few weeks. Vitals:    09/14/18 1552   BP: 136/70   Pulse: 80       Current Outpatient Prescriptions   Medication Sig Dispense Refill    tiZANidine (ZANAFLEX) 4 MG tablet TAKE 1/2 TABLET BY MOUTH THREE TIMES A DAY FOR 2 DAYS, THEN TAKE 1 TABLET BY MOUTH THREE TIMES A DAY AFTER  2    losartan (COZAAR) 50 MG tablet Take 50 mg by mouth daily       dexlansoprazole (DEXILANT) 60 MG CPDR delayed release capsule Take 60 mg by mouth daily      albuterol sulfate  (90 BASE) MCG/ACT inhaler Inhale 2 puffs into the lungs every 6 hours as needed for Wheezing 1 Inhaler 5    Spacer/Aero-Holding Chambers (AEROCHAMBER) MISC Inhale 1 Device into the lungs every 6 hours 1 each 0    zolpidem (AMBIEN) 10 MG tablet Take by mouth nightly as needed for Sleep      furosemide (LASIX) 40 MG tablet Take 1 tablet by mouth 2 times daily 180 tablet 3    dronedarone hcl (MULTAQ) 400 MG TABS Take 400 mg by mouth 2 times daily (with meals)      nitroGLYCERIN (NITROLINGUAL) 0.4 MG/SPRAY 0.4 mg spray Place 1 spray under the tongue every 5 minutes as needed for Chest pain      CPAP Machine MISC by Does not apply route nightly      apixaban (ELIQUIS) 5 MG TABS tablet Take 1 tablet by mouth 2 times daily 60 tablet 3    tadalafil (CIALIS) 5 MG tablet Take 1 tablet by mouth as needed for Erectile Dysfunction 30 tablet 0    rosuvastatin (CRESTOR) 20 MG tablet Take 20 mg by mouth daily.  pramipexole (MIRAPEX) 1 MG tablet   Take 0.5 mg by mouth 2 times daily Patient takes 1/2 tablet in the am and 1 1/2 tablet at night.       metoprolol (LOPRESSOR) 50 MG tablet Take 50 mg by mouth 2 times daily.  modafinil (PROVIGIL) 100 MG tablet Take 100 mg by mouth daily.  esomeprazole (NEXIUM) 40 MG capsule Take 40 mg by mouth 2 times daily.  aspirin 81 MG chewable tablet Take 81 mg by mouth daily. No current facility-administered medications for this visit. Allergies: Ceclor [cefaclor]; Celebrex [celecoxib]; Pcn [penicillins]; and Relafen [nabumetone]  Past Medical History:   Diagnosis Date    Aortic regurgitation 4/3/2012    4/3/2012-mild; 2/7/2011-mild to moderate    Arrhythmia 2/22/2011 2/22/2011-tachycardia episode during cardiac cath-48 HR Holter monitor normal    Blisters of multiple sites 10/5/2016    CAD (coronary artery disease)     sees Dr Lorraine Betancur, prior Dr Carmelo Aguirre;   Krystina Padgett Cardiac pacemaker 4/2001 4/2001-[employer] PPM Integrity DDD Model 5342    Congestive heart failure [facility])     Family history of coronary artery disease     Father-MI    GERD (gastroesophageal reflux disease)     H/O cardiac catheterization 5/2/2012, 2/22/2011, 3/4/2010, 2/2001 5/2/2012-[facility]-No evid of signif CAD; Normal peripheral angiogram; 2/22/2011-No signif CAD, stent patent, had tachycardic event; 3/4/2010-Severe stenosis and re in-stent stenosis of LAD    H/O cardiovascular stress test 4/16/2012, 2/7/2011 4/16/2012-Normal Lexiscan study. No evid of ischemia. Attenuation artifact in inferior region of myocardium. EF 58%.  H/O cardiovascular stress test 3/4/2015    normal, no ischemia, EF62%    H/O cardiovascular stress test 12/06/2017    EF60% normal study    H/O Doppler ultrasound 2/7/2011 2/7/2011-CAROTID US-Normal    H/O Doppler ultrasound 5/24/2013    periphal normal    H/O Doppler ultrasound 6/18/14 6/14 Normal    H/O Doppler ultrasound 11/13/15 09/03/15    11/15 WNL 9/15Arterial and Venous Doppler is normal.     H/O echocardiogram 03/04/15    EF60% Normal LV and systolic function.  Trace MR, Aortic, and TR. No other valvulopathy.  H/O echocardiogram 08/08/2016    EF60% mild AR, mild phtn    H/O echocardiogram 11/01/2017    EF55-60% sclerotic, non stenotic AV, mild AR, phtn    H/O peptic ulcer 1970s    Hiatal hernia     History of cardiac monitoring 6/1/15    Event - Patient noted to have recurrence of atrial fibrillation with RVR on medical therapy on event monitor    History of Holter monitoring 5/19/15    24 hour - predominant rhythm was sinus intermittent paced, paced rhythm can not be ruled out d/t pacemaker function being turned off during device setup, limited pacemaker data available    History of nuclear stress test 11/10/2016    lexiscan-normal,EF70%    HX OTHER MEDICAL 5/2/2012 5/2/2012-PERIPHERAL ANGIOGRAM-Normal-Dr Bryan Vieyra    HX OTHER MEDICAL 3/2/2011    3/2/2011-48 HR HOLTER MONITOR-Pred Rhythm is sinus rhythm. No arrhythmias.     Hyperlipidemia     Hypertension     S/P angioplasty with stent 3/4/2010, 2/2001    3/4/2010-PTCA with 2.75 X 38 mm stent to LAD; 2/2001-PTCA with stent to LAD    S/P cardiac pacemaker procedure 6/04/13    battery replacement    Sleep apnea     has CPAP     Past Surgical History:   Procedure Laterality Date    ATRIAL ABLATION SURGERY  8/11/15    Atrial fib/flutter ablation     CARDIAC CATHETERIZATION  06/14/2015    no significant disease    CARDIAC PACEMAKER PLACEMENT  4/2001 4/2001-[employer] PPM Integrity DDD Model 5342    CARPAL TUNNEL RELEASE Bilateral 1976    CORONARY ANGIOPLASTY WITH STENT PLACEMENT  3/4/2010, 2/2001    3/4/2010-PTCA with 2.75 X 38 mm stent to LAD; 2/2001-PTCA with stent to LAD;    DIAGNOSTIC CARDIAC CATH LAB PROCEDURE  5/2/2012,2/22/2011, 3/4/2010, 2/2001 5/2/2012-[facility]-No evid of signif CAD; Normal peripheral angiogram; 2/22/2011-No signif CAD, stent patent, had tachycardic event; 3/4/2010-Severe stenosis and re in-stent stenosis of LAD    FINGER TRIGGER RELEASE Left 1994    thumb    HAND TENDON SURGERY Left 1976    Left middle finger    HERNIA REPAIR Bilateral 1993    bilateral inguinal hernias    JOINT REPLACEMENT      knees bilaterally-Right 10/2009; 1/2008-Left    KNEE ARTHROSCOPY  11/2005, 1989 11/2005, 1989-Right knee    LUMBAR DISC ARTHROPLASTY  07/08/2016    OTHER SURGICAL HISTORY  5/2/2012 5/2/2012-PERIPHERAL ANGIOGRAM-Normal    UPPER GASTROINTESTINAL ENDOSCOPY  5/3/2001    5/3/0316-[facility])     Family History   Problem Relation Age of Onset    Heart Disease Mother     High Blood Pressure Mother     Stroke Father     Coronary Art Dis Father         MI    Other Brother         accidental    Brain Cancer Paternal Grandfather      Social History   Substance Use Topics    Smoking status: Former Smoker    Smokeless tobacco: Never Used    Alcohol use No          Review of systems:  HEENT: Neg  Card: SOB   GI;Neg  : Neg  Neuro: Neg  Psych: Neg  Derm: Neg  MS; Neg  All: Documented  Constitutional: Neg    Objective:      Physical Exam:  /70   Pulse 80   Wt Readings from Last 3 Encounters:   04/27/18 220 lb 12.8 oz (100.2 kg)   11/30/17 217 lb 9.6 oz (98.7 kg)   10/24/17 223 lb (101.2 kg)     There is no height or weight on file to calculate BMI. GENERAL - Alert, oriented, pleasant, in no apparent distress. Head unremarkable  Eyes  Not injected conjunctiva  ENT  normal mucosa  Neck - Supple. No jugular venous distention noted. No carotid bruits. Cardiovascular  Normal S1 and S2 without obvious murmur or gallop. Extremities - No cyanosis, clubbing, or significant edema. Pulmonary  No respiratory distress. No wheezes or rales. Pulses: Bilateral radial and pedal pulses normal  Abdomen  no tenderness  Musculoskeletal  normal strength  Neurologic    There are  no gross focal neurologic abnormalities.   Skin-  No rash  Affect; normal mood    DATA:  Lab Results   Component Value Date    CKTOTAL 297 05/01/2012    CKMB 7.2 05/01/2012    TROPONINI <0.006 05/01/2012

## 2018-09-17 ENCOUNTER — TELEPHONE (OUTPATIENT)
Dept: CARDIOLOGY CLINIC | Age: 78
End: 2018-09-17

## 2018-09-17 PROCEDURE — 93294 REM INTERROG EVL PM/LDLS PM: CPT | Performed by: INTERNAL MEDICINE

## 2018-09-17 PROCEDURE — 93296 REM INTERROG EVL PM/IDS: CPT | Performed by: INTERNAL MEDICINE

## 2018-09-18 ENCOUNTER — PROCEDURE VISIT (OUTPATIENT)
Dept: CARDIOLOGY CLINIC | Age: 78
End: 2018-09-18

## 2018-09-18 DIAGNOSIS — Z95.0 CARDIAC PACEMAKER IN SITU: Primary | ICD-10-CM

## 2018-10-03 ENCOUNTER — HOSPITAL ENCOUNTER (OUTPATIENT)
Dept: NEUROLOGY | Age: 78
Discharge: HOME | End: 2018-10-03
Attending: FAMILY MEDICINE
Payer: MEDICARE

## 2018-10-03 PROCEDURE — 95861 NEEDLE EMG 2 EXTREMITIES: CPT

## 2018-10-04 ENCOUNTER — HOSPITAL ENCOUNTER (OUTPATIENT)
Dept: NEUROLOGY | Age: 78
Discharge: HOME | End: 2018-10-04
Attending: FAMILY MEDICINE
Payer: MEDICARE

## 2018-10-04 PROCEDURE — 95861 NEEDLE EMG 2 EXTREMITIES: CPT

## 2018-11-27 ENCOUNTER — TELEPHONE (OUTPATIENT)
Dept: CARDIOLOGY CLINIC | Age: 78
End: 2018-11-27

## 2018-11-30 ENCOUNTER — OFFICE VISIT (OUTPATIENT)
Dept: CARDIOLOGY CLINIC | Age: 78
End: 2018-11-30
Payer: MEDICARE

## 2018-11-30 VITALS
SYSTOLIC BLOOD PRESSURE: 160 MMHG | WEIGHT: 223.3 LBS | BODY MASS INDEX: 35.89 KG/M2 | DIASTOLIC BLOOD PRESSURE: 70 MMHG | HEART RATE: 65 BPM | HEIGHT: 66 IN

## 2018-11-30 DIAGNOSIS — I10 ESSENTIAL HYPERTENSION: ICD-10-CM

## 2018-11-30 DIAGNOSIS — I48.0 PAF (PAROXYSMAL ATRIAL FIBRILLATION) (HCC): ICD-10-CM

## 2018-11-30 DIAGNOSIS — E78.2 MIXED HYPERLIPIDEMIA: Primary | ICD-10-CM

## 2018-11-30 DIAGNOSIS — Z95.0 PACEMAKER: ICD-10-CM

## 2018-11-30 DIAGNOSIS — G47.30 SLEEP APNEA, UNSPECIFIED TYPE: ICD-10-CM

## 2018-11-30 DIAGNOSIS — R06.02 SOB (SHORTNESS OF BREATH): ICD-10-CM

## 2018-11-30 DIAGNOSIS — I25.118 CORONARY ARTERY DISEASE OF NATIVE HEART WITH STABLE ANGINA PECTORIS, UNSPECIFIED VESSEL OR LESION TYPE (HCC): ICD-10-CM

## 2018-11-30 DIAGNOSIS — Z01.818 PRE-OP EXAMINATION: ICD-10-CM

## 2018-11-30 PROCEDURE — 99214 OFFICE O/P EST MOD 30 MIN: CPT | Performed by: NURSE PRACTITIONER

## 2018-11-30 PROCEDURE — 93000 ELECTROCARDIOGRAM COMPLETE: CPT | Performed by: NURSE PRACTITIONER

## 2018-11-30 NOTE — PROGRESS NOTES
CARDIOLOGY  NOTE      11/30/2018    RE: Delphine Yan  (1940)                               TO:  Dr. Clifford Schuler MD  The primary cardiologist is Dr. Kaitlynn Olea    CC: CAD- HTN- HLP- PPM -sleep apnea- prep op clearance    HPI:  He during this visit has the following concerns:   Chest pain No  SOB Yes- he notes there her worsening SOB with exertion. It goes away with rest.    Palpitations No  Dizziness No  Syncope No    He reports that he is to have arthroplasty in December. Pace site is intact. He is walking with a cane for balance. Vitals:    11/30/18 0831   BP: (!) 160/70   Pulse:        Current Outpatient Prescriptions   Medication Sig Dispense Refill    tiZANidine (ZANAFLEX) 4 MG tablet TAKE 1/2 TABLET BY MOUTH THREE TIMES A DAY FOR 2 DAYS, THEN TAKE 1 TABLET BY MOUTH THREE TIMES A DAY AFTER  2    losartan (COZAAR) 50 MG tablet Take 50 mg by mouth daily       dexlansoprazole (DEXILANT) 60 MG CPDR delayed release capsule Take 60 mg by mouth daily      zolpidem (AMBIEN) 10 MG tablet Take by mouth nightly as needed for Sleep      furosemide (LASIX) 40 MG tablet Take 1 tablet by mouth 2 times daily 180 tablet 3    dronedarone hcl (MULTAQ) 400 MG TABS Take 400 mg by mouth 2 times daily (with meals)      nitroGLYCERIN (NITROLINGUAL) 0.4 MG/SPRAY 0.4 mg spray Place 1 spray under the tongue every 5 minutes as needed for Chest pain      CPAP Machine MISC by Does not apply route nightly      apixaban (ELIQUIS) 5 MG TABS tablet Take 1 tablet by mouth 2 times daily 60 tablet 3    tadalafil (CIALIS) 5 MG tablet Take 1 tablet by mouth as needed for Erectile Dysfunction 30 tablet 0    rosuvastatin (CRESTOR) 20 MG tablet Take 20 mg by mouth daily.  pramipexole (MIRAPEX) 1 MG tablet   Take 0.5 mg by mouth 2 times daily Patient takes 1/2 tablet in the am and 1 1/2 tablet at night.       metoprolol (LOPRESSOR) 50 MG tablet Take 50 mg by mouth 2 times apparent distress. Head unremarkable  Eyes - pupils equal and reactive to light - bilateral conjunctiva are pink: sclera are white   ENT - external ears intact, nose is intact:  tongue is midline pink and moist  Neck - Supple. No jugular venous distention noted. No carotid bruits appreciated. Cardiovascular - Normal S1 and S2: systolic murmur appreciated No, No gallop. Regular rate Yes  Extremities - No cyanosis, clubbing, trace edema to lower legs. Pulmonary - No respiratory distress. No wheezes or rales. Chest is clear  Pulses: Bilateral radial and pedal pulses normal  Abdomen -  Soft no tenderness, non distended   Musculoskeletal - Normal movement of all extremities   Neurologic - alert and oriented: There are no gross focal neurologic abnormalities. Skin-  No rash: No echymosis   Affect- normal mood and pleasant     DATA:  Lab Results   Component Value Date    CKTOTAL 297 05/01/2012    CKMB 7.2 05/01/2012    TROPONINI <0.006 05/01/2012     BNP:    Lab Results   Component Value Date    BNP 43 12/22/2011     PT/INR:  No results found for: PTINR  No results found for: LABA1C  Lab Results   Component Value Date    CHOL 121 03/24/2017    TRIG 72 03/24/2017    HDL 48 03/24/2017    LDLDIRECT 67 03/24/2017     Lab Results   Component Value Date    ALT 26 03/24/2017    AST 29 03/24/2017     TSH:  No results found for: TSH     EKG atrial paced rhythm     Assessment/ Plan:    Patient seen, interviewed and examined. Testing was reviewed. CAD    Stable   Current medicatons include: lopressor crestor  He is to continue with current medications   NM stress test 12/2017 normal perfusion no ischemia  EF 60 %  2015/ Left Heart Cath-  no significant CAD     HTN    Uncontrolled  Current medications include: cozaar   Notes he just took his bed 30 minutes ago. To continue to follow - if it remains high will adjust medications. To call with numbers.    He is to continue current medications   advised low salt diet

## 2018-12-03 ENCOUNTER — INITIAL CONSULT (OUTPATIENT)
Dept: PULMONOLOGY | Age: 78
End: 2018-12-03
Payer: MEDICARE

## 2018-12-03 VITALS
HEIGHT: 66 IN | RESPIRATION RATE: 16 BRPM | OXYGEN SATURATION: 92 % | WEIGHT: 220 LBS | HEART RATE: 76 BPM | DIASTOLIC BLOOD PRESSURE: 68 MMHG | BODY MASS INDEX: 35.36 KG/M2 | SYSTOLIC BLOOD PRESSURE: 132 MMHG

## 2018-12-03 DIAGNOSIS — G47.19 EXCESSIVE DAYTIME SLEEPINESS: ICD-10-CM

## 2018-12-03 DIAGNOSIS — E66.9 OBESITY (BMI 35.0-39.9 WITHOUT COMORBIDITY): ICD-10-CM

## 2018-12-03 DIAGNOSIS — G47.33 OSA (OBSTRUCTIVE SLEEP APNEA): ICD-10-CM

## 2018-12-03 DIAGNOSIS — Z01.818 PRE-OP EXAMINATION: ICD-10-CM

## 2018-12-03 DIAGNOSIS — R06.02 SOB (SHORTNESS OF BREATH) ON EXERTION: ICD-10-CM

## 2018-12-03 PROCEDURE — G8484 FLU IMMUNIZE NO ADMIN: HCPCS | Performed by: INTERNAL MEDICINE

## 2018-12-03 PROCEDURE — 1101F PT FALLS ASSESS-DOCD LE1/YR: CPT | Performed by: INTERNAL MEDICINE

## 2018-12-03 PROCEDURE — G8427 DOCREV CUR MEDS BY ELIG CLIN: HCPCS | Performed by: INTERNAL MEDICINE

## 2018-12-03 PROCEDURE — 4040F PNEUMOC VAC/ADMIN/RCVD: CPT | Performed by: INTERNAL MEDICINE

## 2018-12-03 PROCEDURE — G8417 CALC BMI ABV UP PARAM F/U: HCPCS | Performed by: INTERNAL MEDICINE

## 2018-12-03 PROCEDURE — G8598 ASA/ANTIPLAT THER USED: HCPCS | Performed by: INTERNAL MEDICINE

## 2018-12-03 PROCEDURE — 1123F ACP DISCUSS/DSCN MKR DOCD: CPT | Performed by: INTERNAL MEDICINE

## 2018-12-03 PROCEDURE — 1036F TOBACCO NON-USER: CPT | Performed by: INTERNAL MEDICINE

## 2018-12-03 PROCEDURE — 99204 OFFICE O/P NEW MOD 45 MIN: CPT | Performed by: INTERNAL MEDICINE

## 2018-12-03 ASSESSMENT — SLEEP AND FATIGUE QUESTIONNAIRES
HOW LIKELY ARE YOU TO NOD OFF OR FALL ASLEEP WHILE SITTING INACTIVE IN A PUBLIC PLACE: 3
HOW LIKELY ARE YOU TO NOD OFF OR FALL ASLEEP WHILE SITTING QUIETLY AFTER LUNCH WITHOUT ALCOHOL: 3
NECK CIRCUMFERENCE (INCHES): 19.25
HOW LIKELY ARE YOU TO NOD OFF OR FALL ASLEEP WHILE LYING DOWN TO REST IN THE AFTERNOON WHEN CIRCUMSTANCES PERMIT: 2
HOW LIKELY ARE YOU TO NOD OFF OR FALL ASLEEP WHEN YOU ARE A PASSENGER IN A CAR FOR AN HOUR WITHOUT A BREAK: 3
ESS TOTAL SCORE: 22
HOW LIKELY ARE YOU TO NOD OFF OR FALL ASLEEP WHILE WATCHING TV: 3
HOW LIKELY ARE YOU TO NOD OFF OR FALL ASLEEP WHILE SITTING AND READING: 3
HOW LIKELY ARE YOU TO NOD OFF OR FALL ASLEEP WHILE SITTING AND TALKING TO SOMEONE: 3
HOW LIKELY ARE YOU TO NOD OFF OR FALL ASLEEP IN A CAR, WHILE STOPPED FOR A FEW MINUTES IN TRAFFIC: 2

## 2018-12-03 ASSESSMENT — ENCOUNTER SYMPTOMS
COUGH: 0
EYE ITCHING: 0
EYE DISCHARGE: 0
BACK PAIN: 0
SHORTNESS OF BREATH: 1
ABDOMINAL PAIN: 0
ABDOMINAL DISTENTION: 0

## 2018-12-03 NOTE — PROGRESS NOTES
tadalafil (CIALIS) 5 MG tablet Take 1 tablet by mouth as needed for Erectile Dysfunction 30 tablet 0    rosuvastatin (CRESTOR) 20 MG tablet Take 20 mg by mouth daily.  pramipexole (MIRAPEX) 1 MG tablet   Take 0.5 mg by mouth 2 times daily Patient takes 1/2 tablet in the am and 1 1/2 tablet at night.  metoprolol (LOPRESSOR) 50 MG tablet Take 50 mg by mouth 2 times daily.  modafinil (PROVIGIL) 100 MG tablet Take 100 mg by mouth daily.  esomeprazole (NEXIUM) 40 MG capsule Take 40 mg by mouth 2 times daily.  aspirin 81 MG chewable tablet Take 81 mg by mouth daily. No current facility-administered medications for this visit. Allergies   Allergen Reactions    Ceclor [Cefaclor] Rash    Celebrex [Celecoxib] Rash    Pcn [Penicillins] Rash    Relafen [Nabumetone] Rash       Past Medical History:   Diagnosis Date    Aortic regurgitation 4/3/2012    4/3/2012-mild; 2/7/2011-mild to moderate    Arrhythmia 2/22/2011 2/22/2011-tachycardia episode during cardiac cath-48 HR Holter monitor normal    Blisters of multiple sites 10/5/2016    CAD (coronary artery disease)     sees Dr Karlo Duong, prior Dr Dulce Bagley;   Marylu Hamman Cardiac pacemaker 4/2001 4/2001-[employer] PPM Integrity DDD Model 5342    Congestive heart failure ([facility] [address]     Family history of coronary artery disease     Father-MI    GERD (gastroesophageal reflux disease)     H/O cardiac catheterization 5/2/2012, 2/22/2011, 3/4/2010, 2/2001 5/2/2012-[facility]-No evid of signif CAD; Normal peripheral angiogram; 2/22/2011-No signif CAD, stent patent, had tachycardic event; 3/4/2010-Severe stenosis and re in-stent stenosis of LAD    H/O cardiovascular stress test 4/16/2012, 2/7/2011 4/16/2012-Normal Lexiscan study. No evid of ischemia. Attenuation artifact in inferior region of myocardium. EF 58%.      H/O cardiovascular stress test 3/4/2015    normal, no ischemia, EF62%    H/O cardiovascular stress test 12/06/2017    EF60% normal

## 2018-12-12 ENCOUNTER — HOSPITAL ENCOUNTER (OUTPATIENT)
Age: 78
Discharge: HOME | End: 2018-12-12
Payer: MEDICARE

## 2018-12-12 ENCOUNTER — HOSPITAL ENCOUNTER (OUTPATIENT)
Dept: PULMONOLOGY | Age: 78
Discharge: HOME | End: 2018-12-12
Payer: MEDICARE

## 2018-12-12 LAB
BASE EXCESS MIXED: 3.5 (ref 0–1.2)
CARBON MONOXIDE, BLOOD: 2.4 % (ref 0–5)
CO2 CONTENT: 29 MMOL/L (ref 19–24)
DLCO %PRED: 66 %
DLCO PRED: NORMAL ML/MIN/MMHG
DLCO/VA %PRED: NORMAL %
DLCO/VA PRED: NORMAL ML/MIN/MMHG
DLCO/VA: NORMAL ML/MIN/MMHG
DLCO: NORMAL ML/MIN/MMHG
EXPIRATORY TIME: NORMAL SEC
FEF 25-75% %PRED-PRE: NORMAL L/SEC
FEF 25-75% PRED: NORMAL L/SEC
FEF 25-75%-PRE: NORMAL L/SEC
FEV1 %PRED-PRE: 57 %
FEV1 PRED: NORMAL L
FEV1/FVC %PRED-PRE: NORMAL %
FEV1/FVC PRED: NORMAL %
FEV1/FVC: 104 %
FEV1: NORMAL L
FVC %PRED-PRE: NORMAL %
FVC PRED: NORMAL L
FVC: NORMAL L
GAW %PRED: NORMAL %
GAW PRED: NORMAL L/S/CMH2O
GAW: NORMAL L/S/CMH2O
HCO3 ARTERIAL: 27.8 MMOL/L (ref 18–23)
IC %PRED: NORMAL %
IC PRED: NORMAL L
IC: NORMAL L
METHEMOGLOBIN ARTERIAL: 0.9 %
MVV %PRED-PRE: NORMAL %
MVV PRED: NORMAL L/MIN
MVV-PRE: NORMAL L/MIN
O2 SATURATION: 96.9 % (ref 96–97)
PCO2 ARTERIAL: 40 MMHG (ref 32–45)
PEF %PRED-PRE: NORMAL L/SEC
PEF PRED: NORMAL L/SEC
PEF-PRE: NORMAL L/SEC
PH BLOOD: 7.45 (ref 7.34–7.45)
PO2 ARTERIAL: 106 MMHG (ref 75–100)
RAW %PRED: NORMAL %
RAW PRED: NORMAL CMH2O/L/S
RAW: NORMAL CMH2O/L/S
RV %PRED: NORMAL %
RV PRED: NORMAL L
RV: NORMAL L
SVC %PRED: NORMAL %
SVC PRED: NORMAL L
SVC: NORMAL L
TLC %PRED: 66 %
TLC PRED: NORMAL L
TLC: NORMAL L
VA %PRED: NORMAL %
VA PRED: NORMAL L
VA: NORMAL L
VTG %PRED: NORMAL %
VTG PRED: NORMAL L
VTG: NORMAL L

## 2018-12-12 PROCEDURE — 94727 GAS DIL/WSHOT DETER LNG VOL: CPT

## 2018-12-12 PROCEDURE — 94060 EVALUATION OF WHEEZING: CPT

## 2018-12-12 PROCEDURE — 36600 WITHDRAWAL OF ARTERIAL BLOOD: CPT

## 2018-12-12 PROCEDURE — 82803 BLOOD GASES ANY COMBINATION: CPT

## 2018-12-12 PROCEDURE — 94726 PLETHYSMOGRAPHY LUNG VOLUMES: CPT

## 2018-12-12 ASSESSMENT — PULMONARY FUNCTION TESTS
FEV1_PERCENT_PREDICTED_PRE: 57
FEV1/FVC: 104

## 2018-12-26 ENCOUNTER — TELEPHONE (OUTPATIENT)
Dept: CARDIOLOGY CLINIC | Age: 78
End: 2018-12-26

## 2018-12-26 PROCEDURE — 93294 REM INTERROG EVL PM/LDLS PM: CPT | Performed by: INTERNAL MEDICINE

## 2018-12-26 PROCEDURE — 93296 REM INTERROG EVL PM/IDS: CPT | Performed by: INTERNAL MEDICINE

## 2018-12-27 ENCOUNTER — TELEPHONE (OUTPATIENT)
Dept: CARDIOLOGY CLINIC | Age: 78
End: 2018-12-27

## 2018-12-27 ENCOUNTER — PROCEDURE VISIT (OUTPATIENT)
Dept: CARDIOLOGY CLINIC | Age: 78
End: 2018-12-27
Payer: MEDICARE

## 2018-12-27 DIAGNOSIS — Z95.0 CARDIAC PACEMAKER IN SITU: Primary | ICD-10-CM

## 2018-12-30 PROBLEM — Z01.818 PRE-OP EXAMINATION: Status: RESOLVED | Noted: 2018-11-30 | Resolved: 2018-12-30

## 2019-01-17 ENCOUNTER — HOSPITAL ENCOUNTER (OUTPATIENT)
Dept: PULMONOLOGY | Age: 79
Discharge: HOME | End: 2019-01-17
Payer: MEDICARE

## 2019-01-17 DIAGNOSIS — R06.02 SOB (SHORTNESS OF BREATH) ON EXERTION: ICD-10-CM

## 2019-01-17 DIAGNOSIS — Z01.818 PRE-OP EXAMINATION: ICD-10-CM

## 2019-01-17 PROCEDURE — 94618 PULMONARY STRESS TESTING: CPT

## 2019-01-18 ENCOUNTER — HOSPITAL ENCOUNTER (OUTPATIENT)
Dept: SLEEP CENTER | Age: 79
Discharge: HOME | End: 2019-01-18
Payer: MEDICARE

## 2019-01-18 VITALS — BODY MASS INDEX: 35.36 KG/M2 | WEIGHT: 220 LBS | HEIGHT: 66 IN

## 2019-01-18 PROCEDURE — 95810 POLYSOM 6/> YRS 4/> PARAM: CPT

## 2019-01-18 PROCEDURE — 95810 POLYSOM 6/> YRS 4/> PARAM: CPT | Performed by: INTERNAL MEDICINE

## 2019-02-11 ENCOUNTER — OFFICE VISIT (OUTPATIENT)
Dept: PULMONOLOGY | Age: 79
End: 2019-02-11
Payer: MEDICARE

## 2019-02-11 VITALS
SYSTOLIC BLOOD PRESSURE: 146 MMHG | WEIGHT: 220 LBS | BODY MASS INDEX: 35.51 KG/M2 | DIASTOLIC BLOOD PRESSURE: 82 MMHG | HEART RATE: 83 BPM | OXYGEN SATURATION: 92 %

## 2019-02-11 DIAGNOSIS — G47.33 OSA (OBSTRUCTIVE SLEEP APNEA): ICD-10-CM

## 2019-02-11 DIAGNOSIS — E66.9 OBESITY (BMI 35.0-39.9 WITHOUT COMORBIDITY): ICD-10-CM

## 2019-02-11 DIAGNOSIS — G47.19 EXCESSIVE DAYTIME SLEEPINESS: ICD-10-CM

## 2019-02-11 DIAGNOSIS — R06.02 SOB (SHORTNESS OF BREATH) ON EXERTION: ICD-10-CM

## 2019-02-11 DIAGNOSIS — I27.20 PULMONARY HYPERTENSION (HCC): ICD-10-CM

## 2019-02-11 PROCEDURE — 99213 OFFICE O/P EST LOW 20 MIN: CPT | Performed by: INTERNAL MEDICINE

## 2019-02-11 PROCEDURE — G8428 CUR MEDS NOT DOCUMENT: HCPCS | Performed by: INTERNAL MEDICINE

## 2019-02-11 PROCEDURE — G8417 CALC BMI ABV UP PARAM F/U: HCPCS | Performed by: INTERNAL MEDICINE

## 2019-02-11 PROCEDURE — 1101F PT FALLS ASSESS-DOCD LE1/YR: CPT | Performed by: INTERNAL MEDICINE

## 2019-02-11 PROCEDURE — G8484 FLU IMMUNIZE NO ADMIN: HCPCS | Performed by: INTERNAL MEDICINE

## 2019-02-11 RX ORDER — ALBUTEROL SULFATE 90 UG/1
1 AEROSOL, METERED RESPIRATORY (INHALATION) EVERY 6 HOURS PRN
Qty: 1 INHALER | Refills: 3 | Status: SHIPPED | OUTPATIENT
Start: 2019-02-11 | End: 2020-01-08

## 2019-02-11 ASSESSMENT — ENCOUNTER SYMPTOMS
BACK PAIN: 0
COUGH: 1
EYE DISCHARGE: 0
EYE ITCHING: 0
ABDOMINAL PAIN: 0
SHORTNESS OF BREATH: 1
ABDOMINAL DISTENTION: 0

## 2019-02-18 ENCOUNTER — HOSPITAL ENCOUNTER (OUTPATIENT)
Dept: CT IMAGING | Age: 79
Discharge: HOME | End: 2019-02-18
Payer: MEDICARE

## 2019-02-18 DIAGNOSIS — R06.02 SOB (SHORTNESS OF BREATH) ON EXERTION: ICD-10-CM

## 2019-02-18 DIAGNOSIS — I27.20 PULMONARY HYPERTENSION (HCC): ICD-10-CM

## 2019-02-18 PROCEDURE — 71250 CT THORAX DX C-: CPT

## 2019-02-27 ENCOUNTER — HOSPITAL ENCOUNTER (OUTPATIENT)
Dept: SLEEP CENTER | Age: 79
Discharge: HOME | End: 2019-02-27
Payer: MEDICARE

## 2019-02-27 PROCEDURE — 95811 POLYSOM 6/>YRS CPAP 4/> PARM: CPT

## 2019-03-12 ENCOUNTER — OFFICE VISIT (OUTPATIENT)
Dept: PULMONOLOGY | Age: 79
End: 2019-03-12
Payer: MEDICARE

## 2019-03-12 VITALS
HEIGHT: 66 IN | BODY MASS INDEX: 35.45 KG/M2 | WEIGHT: 220.6 LBS | DIASTOLIC BLOOD PRESSURE: 80 MMHG | OXYGEN SATURATION: 93 % | SYSTOLIC BLOOD PRESSURE: 130 MMHG | HEART RATE: 84 BPM

## 2019-03-12 DIAGNOSIS — E66.9 OBESITY (BMI 35.0-39.9 WITHOUT COMORBIDITY): ICD-10-CM

## 2019-03-12 DIAGNOSIS — R06.02 SOB (SHORTNESS OF BREATH) ON EXERTION: ICD-10-CM

## 2019-03-12 DIAGNOSIS — G47.19 EXCESSIVE DAYTIME SLEEPINESS: ICD-10-CM

## 2019-03-12 DIAGNOSIS — I27.20 PULMONARY HYPERTENSION (HCC): ICD-10-CM

## 2019-03-12 DIAGNOSIS — G47.33 OSA (OBSTRUCTIVE SLEEP APNEA): ICD-10-CM

## 2019-03-12 PROCEDURE — 1036F TOBACCO NON-USER: CPT | Performed by: INTERNAL MEDICINE

## 2019-03-12 PROCEDURE — 1123F ACP DISCUSS/DSCN MKR DOCD: CPT | Performed by: INTERNAL MEDICINE

## 2019-03-12 PROCEDURE — G8484 FLU IMMUNIZE NO ADMIN: HCPCS | Performed by: INTERNAL MEDICINE

## 2019-03-12 PROCEDURE — 99214 OFFICE O/P EST MOD 30 MIN: CPT | Performed by: INTERNAL MEDICINE

## 2019-03-12 PROCEDURE — 1101F PT FALLS ASSESS-DOCD LE1/YR: CPT | Performed by: INTERNAL MEDICINE

## 2019-03-12 PROCEDURE — G8598 ASA/ANTIPLAT THER USED: HCPCS | Performed by: INTERNAL MEDICINE

## 2019-03-12 PROCEDURE — 4040F PNEUMOC VAC/ADMIN/RCVD: CPT | Performed by: INTERNAL MEDICINE

## 2019-03-12 PROCEDURE — G8427 DOCREV CUR MEDS BY ELIG CLIN: HCPCS | Performed by: INTERNAL MEDICINE

## 2019-03-12 PROCEDURE — G8417 CALC BMI ABV UP PARAM F/U: HCPCS | Performed by: INTERNAL MEDICINE

## 2019-03-12 ASSESSMENT — ENCOUNTER SYMPTOMS
SHORTNESS OF BREATH: 0
COUGH: 0
EYE ITCHING: 0
ABDOMINAL PAIN: 0
ABDOMINAL DISTENTION: 0
BACK PAIN: 0
EYE DISCHARGE: 0

## 2019-04-01 ENCOUNTER — PROCEDURE VISIT (OUTPATIENT)
Dept: CARDIOLOGY CLINIC | Age: 79
End: 2019-04-01
Payer: MEDICARE

## 2019-04-01 DIAGNOSIS — Z95.0 CARDIAC PACEMAKER IN SITU: Primary | ICD-10-CM

## 2019-04-01 PROCEDURE — 93296 REM INTERROG EVL PM/IDS: CPT | Performed by: INTERNAL MEDICINE

## 2019-04-01 PROCEDURE — 93294 REM INTERROG EVL PM/LDLS PM: CPT | Performed by: INTERNAL MEDICINE

## 2019-04-15 ENCOUNTER — OFFICE VISIT (OUTPATIENT)
Dept: PULMONOLOGY | Age: 79
End: 2019-04-15
Payer: MEDICARE

## 2019-04-15 VITALS
OXYGEN SATURATION: 95 % | BODY MASS INDEX: 35.84 KG/M2 | SYSTOLIC BLOOD PRESSURE: 160 MMHG | HEART RATE: 92 BPM | WEIGHT: 223 LBS | HEIGHT: 66 IN | DIASTOLIC BLOOD PRESSURE: 90 MMHG

## 2019-04-15 DIAGNOSIS — G47.19 EXCESSIVE DAYTIME SLEEPINESS: ICD-10-CM

## 2019-04-15 DIAGNOSIS — G47.33 OSA (OBSTRUCTIVE SLEEP APNEA): ICD-10-CM

## 2019-04-15 DIAGNOSIS — E66.9 OBESITY (BMI 35.0-39.9 WITHOUT COMORBIDITY): ICD-10-CM

## 2019-04-15 DIAGNOSIS — I27.20 PULMONARY HYPERTENSION (HCC): ICD-10-CM

## 2019-04-15 DIAGNOSIS — R06.02 SOB (SHORTNESS OF BREATH) ON EXERTION: ICD-10-CM

## 2019-04-15 PROCEDURE — 4040F PNEUMOC VAC/ADMIN/RCVD: CPT | Performed by: INTERNAL MEDICINE

## 2019-04-15 PROCEDURE — 99214 OFFICE O/P EST MOD 30 MIN: CPT | Performed by: INTERNAL MEDICINE

## 2019-04-15 PROCEDURE — 1036F TOBACCO NON-USER: CPT | Performed by: INTERNAL MEDICINE

## 2019-04-15 PROCEDURE — 1123F ACP DISCUSS/DSCN MKR DOCD: CPT | Performed by: INTERNAL MEDICINE

## 2019-04-15 PROCEDURE — G8427 DOCREV CUR MEDS BY ELIG CLIN: HCPCS | Performed by: INTERNAL MEDICINE

## 2019-04-15 PROCEDURE — G8417 CALC BMI ABV UP PARAM F/U: HCPCS | Performed by: INTERNAL MEDICINE

## 2019-04-15 PROCEDURE — G8598 ASA/ANTIPLAT THER USED: HCPCS | Performed by: INTERNAL MEDICINE

## 2019-04-15 ASSESSMENT — ENCOUNTER SYMPTOMS
EYE ITCHING: 0
SHORTNESS OF BREATH: 1
ABDOMINAL DISTENTION: 0
EYE DISCHARGE: 0
ABDOMINAL PAIN: 0
BACK PAIN: 0
COUGH: 0

## 2019-04-15 NOTE — PROGRESS NOTES
(LOPRESSOR) 50 MG tablet Take 50 mg by mouth 2 times daily.  modafinil (PROVIGIL) 100 MG tablet Take 100 mg by mouth daily.  esomeprazole (NEXIUM) 40 MG capsule Take 40 mg by mouth 2 times daily.  aspirin 81 MG chewable tablet Take 81 mg by mouth daily. No current facility-administered medications for this visit. Allergies   Allergen Reactions    Ceclor [Cefaclor] Rash    Celebrex [Celecoxib] Rash    Pcn [Penicillins] Rash    Relafen [Nabumetone] Rash       Past Medical History:   Diagnosis Date    Aortic regurgitation 4/3/2012    4/3/2012-mild; 2/7/2011-mild to moderate    Arrhythmia 2/22/2011 2/22/2011-tachycardia episode during cardiac cath-48 HR Holter monitor normal    Blisters of multiple sites 10/5/2016    CAD (coronary artery disease)     sees Dr Bhaskar Geller, prior Dr Lukas Love;   [facility] Cardiac pacemaker 4/2001 4/2001-[employer] PPM Integrity DDD Model 5342    Congestive heart failure ([address]     Family history of coronary artery disease     Father-MI    GERD (gastroesophageal reflux disease)     H/O cardiac catheterization 5/2/2012, 2/22/2011, 3/4/2010, 2/2001 5/2/2012-[facility]-No evid of signif CAD; Normal peripheral angiogram; 2/22/2011-No signif CAD, stent patent, had tachycardic event; 3/4/2010-Severe stenosis and re in-stent stenosis of LAD    H/O cardiovascular stress test 4/16/2012, 2/7/2011 4/16/2012-Normal Lexiscan study. No evid of ischemia. Attenuation artifact in inferior region of myocardium. EF 58%.      H/O cardiovascular stress test 3/4/2015    normal, no ischemia, EF62%    H/O cardiovascular stress test 12/06/2017    EF60% normal study    H/O Doppler ultrasound 2/7/2011 2/7/2011-CAROTID US-Normal    H/O Doppler ultrasound 5/24/2013    periphal normal    H/O Doppler ultrasound 6/18/14 6/14 Normal    H/O Doppler ultrasound 11/13/15 09/03/15    11/15 WNL 9/15Arterial and Venous Doppler is normal.     H/O echocardiogram 03/04/15    EF60% Normal LV and systolic function. Trace MR, Aortic, and TR. No other valvulopathy.  H/O echocardiogram 08/08/2016    EF60% mild AR, mild phtn    H/O echocardiogram 11/01/2017    EF55-60% sclerotic, non stenotic AV, mild AR, phtn    H/O peptic ulcer 1970s    Hiatal hernia     History of cardiac monitoring 6/1/15    Event - Patient noted to have recurrence of atrial fibrillation with RVR on medical therapy on event monitor    History of Holter monitoring 5/19/15    24 hour - predominant rhythm was sinus intermittent paced, paced rhythm can not be ruled out d/t pacemaker function being turned off during device setup, limited pacemaker data available    History of nuclear stress test 11/10/2016    lexiscan-normal,EF70%    HX OTHER MEDICAL 5/2/2012 5/2/2012-PERIPHERAL ANGIOGRAM-Normal-Dr Mike Raines    HX OTHER MEDICAL 3/2/2011    3/2/2011-48 HR HOLTER MONITOR-Pred Rhythm is sinus rhythm. No arrhythmias.     Hyperlipidemia     Hypertension     S/P angioplasty with stent 3/4/2010, 2/2001    3/4/2010-PTCA with 2.75 X 38 mm stent to LAD; 2/2001-PTCA with stent to LAD    S/P cardiac pacemaker procedure 6/04/13    battery replacement    Sleep apnea     has CPAP       Past Surgical History:   Procedure Laterality Date    ATRIAL ABLATION SURGERY  8/11/15    Atrial fib/flutter ablation     CARDIAC CATHETERIZATION  06/14/2015    no significant disease    CARDIAC PACEMAKER PLACEMENT  4/2001 4/2001-[employer] PPM Integrity DDD Model 5342    CARPAL TUNNEL RELEASE Bilateral 1976    CORONARY ANGIOPLASTY WITH STENT PLACEMENT  3/4/2010, 2/2001    3/4/2010-PTCA with 2.75 X 38 mm stent to LAD; 2/2001-PTCA with stent to LAD;    DIAGNOSTIC CARDIAC CATH LAB PROCEDURE  5/2/2012,2/22/2011, 3/4/2010, 2/2001 5/2/2012-[facility]-No evid of signif CAD; Normal peripheral angiogram; 2/22/2011-No signif CAD, stent patent, had tachycardic event; 3/4/2010-Severe stenosis and re in-stent stenosis of LAD    FINGER TRIGGER RELEASE Left 1994 thumb    HAND TENDON SURGERY Left 1976    Left middle finger    HERNIA REPAIR Bilateral 1993    bilateral inguinal hernias    JOINT REPLACEMENT      knees bilaterally-Right 10/2009; 1/2008-Left    KNEE ARTHROSCOPY  11/2005, 1989 11/2005, 1989-Right knee    LUMBAR DISC ARTHROPLASTY  07/08/2016    OTHER SURGICAL HISTORY  5/2/2012 5/2/2012-PERIPHERAL ANGIOGRAM-Normal    UPPER GASTROINTESTINAL ENDOSCOPY  5/3/2001    5/3/3515-[facility])       Social History     Socioeconomic History    Marital status:      Spouse name: None    Number of children: None    Years of education: None    Highest education level: None   Occupational History    None   Social Needs    Financial resource strain: None    Food insecurity:     Worry: None     Inability: None    Transportation needs:     Medical: None     Non-medical: None   Tobacco Use    Smoking status: Former Smoker    Smokeless tobacco: Never Used   Substance and Sexual Activity    Alcohol use: No     Alcohol/week: 0.0 oz    Drug use: No    Sexual activity: Yes     Partners: Female     Comment:     Lifestyle    Physical activity:     Days per week: None     Minutes per session: None    Stress: None   Relationships    Social connections:     Talks on phone: None     Gets together: None     Attends Orthodoxy service: None     Active member of club or organization: None     Attends meetings of clubs or organizations: None     Relationship status: None    Intimate partner violence:     Fear of current or ex partner: None     Emotionally abused: None     Physically abused: None     Forced sexual activity: None   Other Topics Concern    None   Social History Narrative    None       Review of Systems   Constitutional: Negative for fatigue. HENT: Negative for congestion and postnasal drip. Eyes: Negative for discharge and itching. Respiratory: Positive for shortness of breath. Negative for cough.     Cardiovascular: compliant with the BIPAP  Loose weight         Pulmonary hypertension (HCC)     C/w BIPAP  I'll repeat ECHO in 3 months         Relevant Medications    aspirin 81 MG chewable tablet    metoprolol (LOPRESSOR) 50 MG tablet    rosuvastatin (CRESTOR) 20 MG tablet    tadalafil (CIALIS) 5 MG tablet    metoprolol (LOPRESSOR) injection 5 mg (Completed)    furosemide (LASIX) injection 20 mg (Completed)    apixaban (ELIQUIS) 5 MG TABS tablet    diltiazem injection 10 mg (Completed)    diltiazem injection 10 mg (Completed)    digoxin (LANOXIN) tablet 250 mcg (Completed)    furosemide (LASIX) injection 20 mg (Completed)    dronedarone hcl (MULTAQ) 400 MG TABS    nitroGLYCERIN (NITROLINGUAL) 0.4 MG/SPRAY 0.4 mg spray    furosemide (LASIX) 40 MG tablet    losartan (COZAAR) 50 MG tablet       Other    Obesity (BMI 35.0-39.9 without comorbidity)     Advised to loose weight with diet and exercise           Relevant Medications    modafinil (PROVIGIL) 100 MG tablet    Excessive daytime sleepiness     Advised to be compliant with the BIPAP  Loose weight         SOB (shortness of breath) on exertion     Loose weight with diet and exercise           Relevant Medications    albuterol sulfate HFA (VENTOLIN HFA) 108 (90 Base) MCG/ACT inhaler               Return in about 3 months (around 7/15/2019) for ECHO.      Progress notes sent to the referring Provider    Toan Young MD  4/15/2019  4:14 PM

## 2019-07-05 ENCOUNTER — OFFICE VISIT (OUTPATIENT)
Dept: CARDIOLOGY CLINIC | Age: 79
End: 2019-07-05
Payer: MEDICARE

## 2019-07-05 VITALS
HEIGHT: 64 IN | DIASTOLIC BLOOD PRESSURE: 68 MMHG | SYSTOLIC BLOOD PRESSURE: 136 MMHG | OXYGEN SATURATION: 92 % | WEIGHT: 213.6 LBS | BODY MASS INDEX: 36.47 KG/M2 | HEART RATE: 66 BPM | RESPIRATION RATE: 15 BRPM

## 2019-07-05 DIAGNOSIS — E78.2 MIXED HYPERLIPIDEMIA: ICD-10-CM

## 2019-07-05 DIAGNOSIS — R06.02 SOB (SHORTNESS OF BREATH) ON EXERTION: ICD-10-CM

## 2019-07-05 DIAGNOSIS — I27.20 PULMONARY HYPERTENSION (HCC): ICD-10-CM

## 2019-07-05 DIAGNOSIS — I48.0 PAF (PAROXYSMAL ATRIAL FIBRILLATION) (HCC): ICD-10-CM

## 2019-07-05 DIAGNOSIS — I10 HYPERTENSION, UNSPECIFIED TYPE: ICD-10-CM

## 2019-07-05 DIAGNOSIS — Z95.0 PACEMAKER: ICD-10-CM

## 2019-07-05 DIAGNOSIS — G47.33 OSA TREATED WITH BIPAP: ICD-10-CM

## 2019-07-05 DIAGNOSIS — I25.118 CORONARY ARTERY DISEASE OF NATIVE HEART WITH STABLE ANGINA PECTORIS, UNSPECIFIED VESSEL OR LESION TYPE (HCC): Primary | ICD-10-CM

## 2019-07-05 PROBLEM — Z99.89 OSA ON CPAP: Status: ACTIVE | Noted: 2018-12-03

## 2019-07-05 PROCEDURE — 1036F TOBACCO NON-USER: CPT | Performed by: NURSE PRACTITIONER

## 2019-07-05 PROCEDURE — 1123F ACP DISCUSS/DSCN MKR DOCD: CPT | Performed by: NURSE PRACTITIONER

## 2019-07-05 PROCEDURE — G8427 DOCREV CUR MEDS BY ELIG CLIN: HCPCS | Performed by: NURSE PRACTITIONER

## 2019-07-05 PROCEDURE — G8417 CALC BMI ABV UP PARAM F/U: HCPCS | Performed by: NURSE PRACTITIONER

## 2019-07-05 PROCEDURE — 99214 OFFICE O/P EST MOD 30 MIN: CPT | Performed by: NURSE PRACTITIONER

## 2019-07-05 PROCEDURE — G8598 ASA/ANTIPLAT THER USED: HCPCS | Performed by: NURSE PRACTITIONER

## 2019-07-05 PROCEDURE — 4040F PNEUMOC VAC/ADMIN/RCVD: CPT | Performed by: NURSE PRACTITIONER

## 2019-07-05 NOTE — ASSESSMENT & PLAN NOTE
 Controlled   To continue Beta blocker, Diuretic   Labs monitored by PCP   advised low salt diet    Last echo 2018 showed Left ventricular systolic function is normal with an ejection fraction of   55-60%.    Grade I diastolic dysfunction.   The left atrium is mildly dilated.   Mildly sclerotic , but non-stenotic aortic valve.   Mild aortic regurgitation.   No other significant valvulopathy seen.   Right ventricular systolic pressure of 40 mmHg consistent with mild pulmonary hypertension.   No evidence of pericardial effusion.

## 2019-07-08 ENCOUNTER — PROCEDURE VISIT (OUTPATIENT)
Dept: CARDIOLOGY CLINIC | Age: 79
End: 2019-07-08
Payer: MEDICARE

## 2019-07-08 DIAGNOSIS — Z95.0 CARDIAC PACEMAKER IN SITU: Primary | ICD-10-CM

## 2019-07-08 PROCEDURE — 93296 REM INTERROG EVL PM/IDS: CPT | Performed by: INTERNAL MEDICINE

## 2019-07-08 PROCEDURE — 93294 REM INTERROG EVL PM/LDLS PM: CPT | Performed by: INTERNAL MEDICINE

## 2019-07-15 ENCOUNTER — OFFICE VISIT (OUTPATIENT)
Dept: PULMONOLOGY | Age: 79
End: 2019-07-15
Payer: MEDICARE

## 2019-07-15 VITALS
RESPIRATION RATE: 18 BRPM | DIASTOLIC BLOOD PRESSURE: 70 MMHG | SYSTOLIC BLOOD PRESSURE: 160 MMHG | OXYGEN SATURATION: 93 % | BODY MASS INDEX: 37.25 KG/M2 | HEART RATE: 66 BPM | WEIGHT: 217 LBS

## 2019-07-15 DIAGNOSIS — E66.9 OBESITY (BMI 35.0-39.9 WITHOUT COMORBIDITY): ICD-10-CM

## 2019-07-15 DIAGNOSIS — R06.02 SOB (SHORTNESS OF BREATH) ON EXERTION: ICD-10-CM

## 2019-07-15 DIAGNOSIS — I27.20 PULMONARY HYPERTENSION (HCC): ICD-10-CM

## 2019-07-15 DIAGNOSIS — G47.33 OSA TREATED WITH BIPAP: ICD-10-CM

## 2019-07-15 DIAGNOSIS — G47.19 EXCESSIVE DAYTIME SLEEPINESS: ICD-10-CM

## 2019-07-15 PROCEDURE — G8417 CALC BMI ABV UP PARAM F/U: HCPCS | Performed by: INTERNAL MEDICINE

## 2019-07-15 PROCEDURE — 1123F ACP DISCUSS/DSCN MKR DOCD: CPT | Performed by: INTERNAL MEDICINE

## 2019-07-15 PROCEDURE — G8427 DOCREV CUR MEDS BY ELIG CLIN: HCPCS | Performed by: INTERNAL MEDICINE

## 2019-07-15 PROCEDURE — 1036F TOBACCO NON-USER: CPT | Performed by: INTERNAL MEDICINE

## 2019-07-15 PROCEDURE — 99214 OFFICE O/P EST MOD 30 MIN: CPT | Performed by: INTERNAL MEDICINE

## 2019-07-15 PROCEDURE — 4040F PNEUMOC VAC/ADMIN/RCVD: CPT | Performed by: INTERNAL MEDICINE

## 2019-07-15 PROCEDURE — G8598 ASA/ANTIPLAT THER USED: HCPCS | Performed by: INTERNAL MEDICINE

## 2019-07-15 ASSESSMENT — ENCOUNTER SYMPTOMS
COUGH: 0
SHORTNESS OF BREATH: 0
ABDOMINAL PAIN: 0
EYE DISCHARGE: 0
BACK PAIN: 0
ABDOMINAL DISTENTION: 0
EYE ITCHING: 0

## 2019-07-15 NOTE — PROGRESS NOTES
Bruno Ohara  1940  Referring Provider: Dr. Hilda Chow:     Chief Complaint   Patient presents with    3 Month Follow-Up       HPI  Massimo Dominique is a [de-identified] y.o. male has come back as a follow up. He has been diagnosed with severe JAIMIE. He is on a BIPAP which he has been using it most of the nights. He is using it about 5 to 6 hours. He has no loss of weight. He is not sleepy during the day time. He has a FFM. His 2 week download data shows that his residual AHI is 1.6 and leak is 30.6. Current Outpatient Medications   Medication Sig Dispense Refill    tiZANidine (ZANAFLEX) 4 MG tablet TAKE 1/2 TABLET BY MOUTH THREE TIMES A DAY FOR 2 DAYS, THEN TAKE 1 TABLET BY MOUTH THREE TIMES A DAY AFTER  2    losartan (COZAAR) 50 MG tablet Take 50 mg by mouth daily       dexlansoprazole (DEXILANT) 60 MG CPDR delayed release capsule Take 60 mg by mouth daily      zolpidem (AMBIEN) 10 MG tablet Take by mouth nightly as needed for Sleep      furosemide (LASIX) 40 MG tablet Take 1 tablet by mouth 2 times daily 180 tablet 3    dronedarone hcl (MULTAQ) 400 MG TABS Take 400 mg by mouth 2 times daily (with meals)      nitroGLYCERIN (NITROLINGUAL) 0.4 MG/SPRAY 0.4 mg spray Place 1 spray under the tongue every 5 minutes as needed for Chest pain      CPAP Machine MISC by Does not apply route nightly      apixaban (ELIQUIS) 5 MG TABS tablet Take 1 tablet by mouth 2 times daily 60 tablet 3    tadalafil (CIALIS) 5 MG tablet Take 1 tablet by mouth as needed for Erectile Dysfunction 30 tablet 0    rosuvastatin (CRESTOR) 20 MG tablet Take 20 mg by mouth daily.  pramipexole (MIRAPEX) 1 MG tablet   Take 0.5 mg by mouth 2 times daily Patient takes 1/2 tablet in the am and 1 1/2 tablet at night.  metoprolol (LOPRESSOR) 50 MG tablet Take 50 mg by mouth 2 times daily.  modafinil (PROVIGIL) 100 MG tablet Take 100 mg by mouth daily.  esomeprazole (NEXIUM) 40 MG capsule Take 40 mg by mouth 2 times daily.

## 2019-10-14 ENCOUNTER — PROCEDURE VISIT (OUTPATIENT)
Dept: CARDIOLOGY CLINIC | Age: 79
End: 2019-10-14
Payer: MEDICARE

## 2019-10-14 DIAGNOSIS — Z95.0 CARDIAC PACEMAKER IN SITU: Primary | ICD-10-CM

## 2019-10-14 PROCEDURE — 93294 REM INTERROG EVL PM/LDLS PM: CPT | Performed by: INTERNAL MEDICINE

## 2019-10-14 PROCEDURE — 93296 REM INTERROG EVL PM/IDS: CPT | Performed by: INTERNAL MEDICINE

## 2019-11-22 ENCOUNTER — TELEPHONE (OUTPATIENT)
Dept: CARDIOLOGY CLINIC | Age: 79
End: 2019-11-22

## 2019-11-24 ENCOUNTER — HOSPITAL ENCOUNTER (EMERGENCY)
Age: 79
Discharge: ANOTHER HOSPITAL | End: 2019-11-24
Attending: EMERGENCY MEDICINE
Payer: MEDICARE

## 2019-11-24 ENCOUNTER — APPOINTMENT (OUTPATIENT)
Dept: CT IMAGING | Age: 79
End: 2019-11-24
Payer: MEDICARE

## 2019-11-24 ENCOUNTER — APPOINTMENT (OUTPATIENT)
Dept: GENERAL RADIOLOGY | Age: 79
End: 2019-11-24
Payer: MEDICARE

## 2019-11-24 VITALS
TEMPERATURE: 98.1 F | RESPIRATION RATE: 20 BRPM | DIASTOLIC BLOOD PRESSURE: 75 MMHG | HEIGHT: 66 IN | BODY MASS INDEX: 35.03 KG/M2 | WEIGHT: 218 LBS | OXYGEN SATURATION: 97 % | SYSTOLIC BLOOD PRESSURE: 146 MMHG | HEART RATE: 68 BPM

## 2019-11-24 DIAGNOSIS — R09.02 HYPOXIA: ICD-10-CM

## 2019-11-24 DIAGNOSIS — K08.89 PAIN, DENTAL: ICD-10-CM

## 2019-11-24 DIAGNOSIS — R25.2 TRISMUS: ICD-10-CM

## 2019-11-24 DIAGNOSIS — M27.2 MANDIBULAR ABSCESS: Primary | ICD-10-CM

## 2019-11-24 LAB
ALBUMIN SERPL-MCNC: 3.2 GM/DL (ref 3.4–5)
ALP BLD-CCNC: 90 IU/L (ref 40–129)
ALT SERPL-CCNC: 20 U/L (ref 10–40)
ANION GAP SERPL CALCULATED.3IONS-SCNC: 10 MMOL/L (ref 4–16)
AST SERPL-CCNC: 22 IU/L (ref 15–37)
BASOPHILS ABSOLUTE: 0 K/CU MM
BASOPHILS RELATIVE PERCENT: 0.2 % (ref 0–1)
BILIRUB SERPL-MCNC: 0.6 MG/DL (ref 0–1)
BUN BLDV-MCNC: 15 MG/DL (ref 6–23)
CALCIUM SERPL-MCNC: 8.3 MG/DL (ref 8.3–10.6)
CHLORIDE BLD-SCNC: 96 MMOL/L (ref 99–110)
CO2: 27 MMOL/L (ref 21–32)
CREAT SERPL-MCNC: 1.2 MG/DL (ref 0.9–1.3)
DIFFERENTIAL TYPE: ABNORMAL
EOSINOPHILS ABSOLUTE: 0 K/CU MM
EOSINOPHILS RELATIVE PERCENT: 0.5 % (ref 0–3)
GFR AFRICAN AMERICAN: >60 ML/MIN/1.73M2
GFR NON-AFRICAN AMERICAN: 58 ML/MIN/1.73M2
GLUCOSE BLD-MCNC: 118 MG/DL (ref 70–99)
HCT VFR BLD CALC: 33.6 % (ref 42–52)
HEMOGLOBIN: 10.3 GM/DL (ref 13.5–18)
IMMATURE NEUTROPHIL %: 0.3 % (ref 0–0.43)
LACTATE: 0.8 MMOL/L (ref 0.4–2)
LYMPHOCYTES ABSOLUTE: 0.8 K/CU MM
LYMPHOCYTES RELATIVE PERCENT: 9.3 % (ref 24–44)
MCH RBC QN AUTO: 27.6 PG (ref 27–31)
MCHC RBC AUTO-ENTMCNC: 30.7 % (ref 32–36)
MCV RBC AUTO: 90.1 FL (ref 78–100)
MONOCYTES ABSOLUTE: 0.5 K/CU MM
MONOCYTES RELATIVE PERCENT: 6.2 % (ref 0–4)
NUCLEATED RBC %: 0 %
PDW BLD-RTO: 13.7 % (ref 11.7–14.9)
PLATELET # BLD: 236 K/CU MM (ref 140–440)
PMV BLD AUTO: 9 FL (ref 7.5–11.1)
POTASSIUM SERPL-SCNC: 3.5 MMOL/L (ref 3.5–5.1)
RBC # BLD: 3.73 M/CU MM (ref 4.6–6.2)
SEGMENTED NEUTROPHILS ABSOLUTE COUNT: 7.3 K/CU MM
SEGMENTED NEUTROPHILS RELATIVE PERCENT: 83.5 % (ref 36–66)
SODIUM BLD-SCNC: 133 MMOL/L (ref 135–145)
TOTAL IMMATURE NEUTOROPHIL: 0.03 K/CU MM
TOTAL NUCLEATED RBC: 0 K/CU MM
TOTAL PROTEIN: 6.6 GM/DL (ref 6.4–8.2)
WBC # BLD: 8.7 K/CU MM (ref 4–10.5)

## 2019-11-24 PROCEDURE — 6360000004 HC RX CONTRAST MEDICATION: Performed by: EMERGENCY MEDICINE

## 2019-11-24 PROCEDURE — 6370000000 HC RX 637 (ALT 250 FOR IP): Performed by: EMERGENCY MEDICINE

## 2019-11-24 PROCEDURE — 96365 THER/PROPH/DIAG IV INF INIT: CPT

## 2019-11-24 PROCEDURE — 96375 TX/PRO/DX INJ NEW DRUG ADDON: CPT

## 2019-11-24 PROCEDURE — 70491 CT SOFT TISSUE NECK W/DYE: CPT

## 2019-11-24 PROCEDURE — 36415 COLL VENOUS BLD VENIPUNCTURE: CPT

## 2019-11-24 PROCEDURE — 99284 EMERGENCY DEPT VISIT MOD MDM: CPT

## 2019-11-24 PROCEDURE — 87040 BLOOD CULTURE FOR BACTERIA: CPT

## 2019-11-24 PROCEDURE — 80053 COMPREHEN METABOLIC PANEL: CPT

## 2019-11-24 PROCEDURE — 87186 SC STD MICRODIL/AGAR DIL: CPT

## 2019-11-24 PROCEDURE — 83605 ASSAY OF LACTIC ACID: CPT

## 2019-11-24 PROCEDURE — 85025 COMPLETE CBC W/AUTO DIFF WBC: CPT

## 2019-11-24 PROCEDURE — 2580000003 HC RX 258: Performed by: PHYSICIAN ASSISTANT

## 2019-11-24 PROCEDURE — 71046 X-RAY EXAM CHEST 2 VIEWS: CPT

## 2019-11-24 PROCEDURE — 6360000002 HC RX W HCPCS: Performed by: PHYSICIAN ASSISTANT

## 2019-11-24 PROCEDURE — 87076 CULTURE ANAEROBE IDENT EACH: CPT

## 2019-11-24 PROCEDURE — 6360000002 HC RX W HCPCS: Performed by: EMERGENCY MEDICINE

## 2019-11-24 PROCEDURE — 2500000003 HC RX 250 WO HCPCS: Performed by: PHYSICIAN ASSISTANT

## 2019-11-24 RX ORDER — SODIUM CHLORIDE 0.9 % (FLUSH) 0.9 %
10 SYRINGE (ML) INJECTION PRN
Status: DISCONTINUED | OUTPATIENT
Start: 2019-11-24 | End: 2019-11-25 | Stop reason: HOSPADM

## 2019-11-24 RX ORDER — DEXAMETHASONE SODIUM PHOSPHATE 10 MG/ML
10 INJECTION, SOLUTION INTRAMUSCULAR; INTRAVENOUS ONCE
Status: DISCONTINUED | OUTPATIENT
Start: 2019-11-24 | End: 2019-11-24 | Stop reason: CLARIF

## 2019-11-24 RX ORDER — MORPHINE SULFATE 4 MG/ML
2 INJECTION, SOLUTION INTRAMUSCULAR; INTRAVENOUS ONCE
Status: COMPLETED | OUTPATIENT
Start: 2019-11-24 | End: 2019-11-24

## 2019-11-24 RX ORDER — DEXAMETHASONE SODIUM PHOSPHATE 4 MG/ML
10 INJECTION, SOLUTION INTRA-ARTICULAR; INTRALESIONAL; INTRAMUSCULAR; INTRAVENOUS; SOFT TISSUE ONCE
Status: COMPLETED | OUTPATIENT
Start: 2019-11-24 | End: 2019-11-24

## 2019-11-24 RX ORDER — OXYCODONE HYDROCHLORIDE AND ACETAMINOPHEN 5; 325 MG/1; MG/1
1 TABLET ORAL ONCE
Status: COMPLETED | OUTPATIENT
Start: 2019-11-24 | End: 2019-11-24

## 2019-11-24 RX ORDER — ONDANSETRON 2 MG/ML
4 INJECTION INTRAMUSCULAR; INTRAVENOUS ONCE
Status: DISCONTINUED | OUTPATIENT
Start: 2019-11-24 | End: 2019-11-25 | Stop reason: HOSPADM

## 2019-11-24 RX ORDER — SODIUM CHLORIDE 0.9 % (FLUSH) 0.9 %
10 SYRINGE (ML) INJECTION EVERY 12 HOURS SCHEDULED
Status: DISCONTINUED | OUTPATIENT
Start: 2019-11-24 | End: 2019-11-25 | Stop reason: HOSPADM

## 2019-11-24 RX ADMIN — DEXTROSE MONOHYDRATE 600 MG: 5 INJECTION, SOLUTION INTRAVENOUS at 15:54

## 2019-11-24 RX ADMIN — OXYCODONE HYDROCHLORIDE AND ACETAMINOPHEN 1 TABLET: 5; 325 TABLET ORAL at 22:00

## 2019-11-24 RX ADMIN — MORPHINE SULFATE 2 MG: 4 INJECTION, SOLUTION INTRAMUSCULAR; INTRAVENOUS at 15:54

## 2019-11-24 RX ADMIN — IOPAMIDOL 75 ML: 755 INJECTION, SOLUTION INTRAVENOUS at 16:21

## 2019-11-24 RX ADMIN — DEXAMETHASONE SODIUM PHOSPHATE 10 MG: 4 INJECTION, SOLUTION INTRAMUSCULAR; INTRAVENOUS at 18:59

## 2019-11-24 RX ADMIN — MORPHINE SULFATE 2 MG: 4 INJECTION, SOLUTION INTRAMUSCULAR; INTRAVENOUS at 19:53

## 2019-11-24 ASSESSMENT — PAIN SCALES - GENERAL
PAINLEVEL_OUTOF10: 10

## 2019-11-24 ASSESSMENT — PAIN DESCRIPTION - LOCATION: LOCATION: MOUTH

## 2019-11-24 ASSESSMENT — PAIN DESCRIPTION - PAIN TYPE: TYPE: ACUTE PAIN

## 2019-11-29 LAB
CULTURE: NORMAL
Lab: NORMAL
SPECIMEN: NORMAL

## 2019-12-02 ENCOUNTER — TELEPHONE (OUTPATIENT)
Dept: CARDIOLOGY CLINIC | Age: 79
End: 2019-12-02

## 2019-12-07 LAB
CULTURE: NORMAL
CULTURE: NORMAL
Lab: NORMAL
SPECIMEN: NORMAL

## 2019-12-17 ENCOUNTER — TELEPHONE (OUTPATIENT)
Dept: CARDIOLOGY CLINIC | Age: 79
End: 2019-12-17

## 2020-01-08 ENCOUNTER — OFFICE VISIT (OUTPATIENT)
Dept: CARDIOLOGY CLINIC | Age: 80
End: 2020-01-08
Payer: MEDICARE

## 2020-01-08 VITALS
BODY MASS INDEX: 35.74 KG/M2 | HEART RATE: 75 BPM | SYSTOLIC BLOOD PRESSURE: 140 MMHG | DIASTOLIC BLOOD PRESSURE: 88 MMHG | WEIGHT: 222.4 LBS | HEIGHT: 66 IN

## 2020-01-08 PROCEDURE — 4040F PNEUMOC VAC/ADMIN/RCVD: CPT | Performed by: INTERNAL MEDICINE

## 2020-01-08 PROCEDURE — G8427 DOCREV CUR MEDS BY ELIG CLIN: HCPCS | Performed by: INTERNAL MEDICINE

## 2020-01-08 PROCEDURE — 99214 OFFICE O/P EST MOD 30 MIN: CPT | Performed by: INTERNAL MEDICINE

## 2020-01-08 PROCEDURE — 1036F TOBACCO NON-USER: CPT | Performed by: INTERNAL MEDICINE

## 2020-01-08 PROCEDURE — G8417 CALC BMI ABV UP PARAM F/U: HCPCS | Performed by: INTERNAL MEDICINE

## 2020-01-08 PROCEDURE — G8484 FLU IMMUNIZE NO ADMIN: HCPCS | Performed by: INTERNAL MEDICINE

## 2020-01-08 PROCEDURE — 1123F ACP DISCUSS/DSCN MKR DOCD: CPT | Performed by: INTERNAL MEDICINE

## 2020-01-08 NOTE — PROGRESS NOTES
CARDIOLOGY NOTE      1/8/2020    RE: Rhonda Millan  (1940)                               TO:  Dr. Daylin Madrid MD            Simón Gusman is a [de-identified] y.o. male who was seen today for management of  cad                                    HPI:   Patient is here for    - Coronary artery disease, does not have chest pain. Patient is  compliant with prescribed medicines. - Hypertension,is  well controlled, pt is  compliant with medicines  - Hyperlipidimea, lipids are in acceptable range.  Pt  is  compliant with medicines  - PPM                The patient does not have cardiac complaints    Rhonda Millan has the following history recorded in care path:  Patient Active Problem List    Diagnosis Date Noted    Syncope and collapse 10/24/2017     Priority: Low    Blisters of multiple sites 10/05/2016     Priority: Low    Hematuria      Priority: Low    Abdominal discomfort      Priority: Low    S/P ablation of atrial fibrillation      Priority: Low    S/P ablation of atrial flutter      Priority: Low    Atrial fibrillation (HCC)      Priority: Low    History of cardiac monitoring 06/01/2015     Priority: Low    PAF (paroxysmal atrial fibrillation) (HCC)      Priority: Low    HTN (hypertension)      Priority: Low    Tachycardia      Priority: Low    Congestive heart failure (HCC)      Priority: Low    Pacemaker      Priority: Low    CAD (coronary artery disease) 02/23/2015     Priority: Low    Swelling of calf 02/18/2014     Priority: Low    Pacer at end of battery life 05/29/2013     Priority: Low    SSS (sick sinus syndrome) ([address] 05/29/2013     Priority: Low    Claudication ([address] 05/24/2013     Priority: Low    History of PTCA      Priority: Low    Hyperlipidemia      Priority: Low    Hypertension      Priority: Low    Sleep apnea      Priority: Low    Pulmonary hypertension ([address] 02/11/2019    JAIMIE treated with BiPAP 12/03/2018    Obesity (BMI 35.0-39.9 without comorbidity) 12/03/2018    Excessive daytime sleepiness 12/03/2018    SOB (shortness of breath) on exertion 12/03/2018     Current Outpatient Medications   Medication Sig Dispense Refill    tiZANidine (ZANAFLEX) 4 MG tablet TAKE 1/2 TABLET BY MOUTH THREE TIMES A DAY FOR 2 DAYS, THEN TAKE 1 TABLET BY MOUTH THREE TIMES A DAY AFTER  2    losartan (COZAAR) 50 MG tablet Take 50 mg by mouth daily       zolpidem (AMBIEN) 10 MG tablet Take by mouth nightly as needed for Sleep      furosemide (LASIX) 40 MG tablet Take 1 tablet by mouth 2 times daily 180 tablet 3    dronedarone hcl (MULTAQ) 400 MG TABS Take 400 mg by mouth 2 times daily (with meals)      CPAP Machine MISC by Does not apply route nightly      apixaban (ELIQUIS) 5 MG TABS tablet Take 1 tablet by mouth 2 times daily 60 tablet 3    tadalafil (CIALIS) 5 MG tablet Take 1 tablet by mouth as needed for Erectile Dysfunction 30 tablet 0    rosuvastatin (CRESTOR) 20 MG tablet Take 20 mg by mouth daily.  pramipexole (MIRAPEX) 1 MG tablet   Take 0.5 mg by mouth 2 times daily Patient takes 1/2 tablet in the am and 1 1/2 tablet at night.  metoprolol (LOPRESSOR) 50 MG tablet Take 50 mg by mouth 2 times daily.  modafinil (PROVIGIL) 100 MG tablet Take 100 mg by mouth daily.  esomeprazole (NEXIUM) 40 MG capsule Take 40 mg by mouth 2 times daily.  aspirin 81 MG chewable tablet Take 81 mg by mouth daily.  dexlansoprazole (DEXILANT) 60 MG CPDR delayed release capsule Take 60 mg by mouth daily      nitroGLYCERIN (NITROLINGUAL) 0.4 MG/SPRAY 0.4 mg spray Place 1 spray under the tongue every 5 minutes as needed for Chest pain       No current facility-administered medications for this visit. Allergies: Ceclor [cefaclor]; Celebrex [celecoxib];  Pcn [penicillins]; and Relafen [nabumetone]  Past Medical History:   Diagnosis Date    Aortic regurgitation 4/3/2012    4/3/2012-mild; 2/7/2011-mild to moderate    Arrhythmia 2/22/2011 2/22/2011-tachycardia episode during cardiac cath-48 HR Holter monitor normal    Blisters of multiple sites 10/5/2016    CAD (coronary artery disease)     sees Dr Rick Severs, prior Dr Celeste Miranda;   [facility] Cardiac pacemaker 4/2001 4/2001-[employer] PPM Integrity DDD Model 5342    Congestive heart failure [facility])     Family history of coronary artery disease     Father-MI    GERD (gastroesophageal reflux disease)     H/O cardiac catheterization 5/2/2012, 2/22/2011, 3/4/2010, 2/2001 5/2/2012-[facility]-No evid of signif CAD; Normal peripheral angiogram; 2/22/2011-No signif CAD, stent patent, had tachycardic event; 3/4/2010-Severe stenosis and re in-stent stenosis of LAD    H/O cardiovascular stress test 4/16/2012, 2/7/2011 4/16/2012-Normal Lexiscan study. No evid of ischemia. Attenuation artifact in inferior region of myocardium. EF 58%.  H/O cardiovascular stress test 3/4/2015    normal, no ischemia, EF62%    H/O cardiovascular stress test 12/06/2017    EF60% normal study    H/O Doppler ultrasound 2/7/2011 2/7/2011-CAROTID US-Normal    H/O Doppler ultrasound 5/24/2013    periphal normal    H/O Doppler ultrasound 6/18/14 6/14 Normal    H/O Doppler ultrasound 11/13/15 09/03/15    11/15 WNL 9/15Arterial and Venous Doppler is normal.     H/O echocardiogram 03/04/15    EF60% Normal LV and systolic function. Trace MR, Aortic, and TR. No other valvulopathy.      H/O echocardiogram 08/08/2016    EF60% mild AR, mild phtn    H/O echocardiogram 11/01/2017    EF55-60% sclerotic, non stenotic AV, mild AR, phtn    H/O peptic ulcer 1970s    Hiatal hernia     History of cardiac monitoring 6/1/15    Event - Patient noted to have recurrence of atrial fibrillation with RVR on medical therapy on event monitor    History of Holter monitoring 5/19/15    24 hour - predominant rhythm was sinus intermittent paced, paced rhythm can not be ruled out d/t pacemaker function being turned off during device setup, limited pacemaker data  Other Brother         accidental    Brain Cancer Paternal Grandfather      Social History     Tobacco Use    Smoking status: Never Smoker    Smokeless tobacco: Never Used   Substance Use Topics    Alcohol use: No     Alcohol/week: 0.0 standard drinks      Review of Systems:    Constitutional: Negative for diaphoresis and fatigue  Psychological:Negative for anxiety or depression  HENT: Negative for headaches, nasal congestion, sinus pain or vertigo  Eyes: Negative for visual disturbance. Endocrine: Negative for polydipsia/polyuria  Respiratory: Negative for shortness of breath  Cardiovascular: Negative for chest pain, dyspnea on exertion, claudication, edema, irregular heartbeat, murmur, palpitations or shortness of breath  Gastrointestinal: Negative for abdominal pain or heartburn  Genito-Urinary: Negative for urinary frequency/urgency  Musculoskeletal: Negative for muscle pain, muscular weakness, negative for pain in arm and leg or swelling in foot and leg  Neurological: Negative for dizziness, headaches, memory loss, numbness/tingling, visual changes, syncope  Dermatological: Negative for rash    Objective:  BP (!) 140/88   Pulse 75   Ht 5' 6\" (1.676 m)   Wt 222 lb 6.4 oz (100.9 kg)   BMI 35.90 kg/m²   Wt Readings from Last 3 Encounters:   01/08/20 222 lb 6.4 oz (100.9 kg)   11/24/19 218 lb (98.9 kg)   07/15/19 217 lb (98.4 kg)     Body mass index is 35.9 kg/m². GENERAL - Alert, oriented, pleasant, in no apparent distress. EYES: No jaundice, no conjunctival pallor. SKIN: It is warm & dry. No rashes. No Echhymosis    HEENT - No clinically significant abnormalities seen. Neck - Supple. No jugular venous distention noted. No carotid bruits. Cardiovascular - Normal S1 and S2 without obvious murmur or gallop. Extremities - No cyanosis, clubbing, or significant edema. Pulmonary - No respiratory distress. No wheezes or rales.     Abdomen - No masses, tenderness, or S/P ablation of atrial fibrillation Z98.890, Z86.79    S/P ablation of atrial flutter Z98.890, Z86.79    Hematuria R31.9    Abdominal discomfort R10.9    Blisters of multiple sites R23.8    Syncope and collapse R55    JAIMIE treated with BiPAP G47.33    Obesity (BMI 35.0-39.9 without comorbidity) E66.9    Excessive daytime sleepiness G47.19    SOB (shortness of breath) on exertion R06.02    Pulmonary hypertension (HCC) I27.20       Assessment & Plan:    - check carotid US            -     CORONARY ARTERY DISEASE:  asymptomatic     All available  tests in chart reviewed. Management discussed . Testing ordered  no                                 -  Hypertension: Patients blood pressure is normal. Patient is advised about low sodium diet. Present medical regimen will not be changed. - PAF on multaq and anticoag           -  LIPID MANAGEMENT:  Available lipid  lab data reviewed  and patient was given dietary advice. NCEP- ATP III guidelines reviewed with patient. -   Changes  in medicines made:  No                                - PPM  Reviewed  stable        Kinza Gu MA  [facility]

## 2020-01-08 NOTE — LETTER
CLINICAL STAFF DOCUMENTATION    Karyn Rogelio  1940  [id number]    Have you had any Chest Pain - No    Have you had any Shortness of Breath - Yes  If Yes - When on exertion    Have you had any dizziness - No      Have you had any palpitations - No      Do you have any edema - swelling in legs    If Yes - CHECK TO SEE IF THE EDEMA IS PITTING  How long have they been having edema - Years  If Yes - Have they worn compression stockings No    Check Venous \"LEG PROBLEM Questionnaire    Do you have a surgery or procedure scheduled in the near future - No      Ask patient if they want to sign up for MyChart if they are not already signed up    Check to see if we have an E-MAIL on file for the patient    Check medication list thoroughly!!!  BE SURE TO ASK PATIENT IF THEY NEED MEDICATION REFILLS

## 2020-01-15 ENCOUNTER — PROCEDURE VISIT (OUTPATIENT)
Dept: CARDIOLOGY CLINIC | Age: 80
End: 2020-01-15
Payer: MEDICARE

## 2020-01-15 PROCEDURE — 93880 EXTRACRANIAL BILAT STUDY: CPT | Performed by: INTERNAL MEDICINE

## 2020-01-16 ENCOUNTER — TELEPHONE (OUTPATIENT)
Dept: CARDIOLOGY CLINIC | Age: 80
End: 2020-01-16

## 2020-01-16 NOTE — TELEPHONE ENCOUNTER
Advised patient of results. Patient voiced understanding. No hemodynamically significant stenosis noted in the internal carotid artery    bilaterally.    The Right internal carotid artery exhibits mild homogenous plaque .    The Left internal carotid artery exhibits minimal homogenous plaque .    There is tortuosity of the bilateral internal carotid arteries.

## 2020-01-21 ENCOUNTER — TELEPHONE (OUTPATIENT)
Dept: CARDIOLOGY CLINIC | Age: 80
End: 2020-01-21

## 2020-01-21 ENCOUNTER — PROCEDURE VISIT (OUTPATIENT)
Dept: CARDIOLOGY CLINIC | Age: 80
End: 2020-01-21
Payer: MEDICARE

## 2020-01-21 PROCEDURE — 93294 REM INTERROG EVL PM/LDLS PM: CPT | Performed by: INTERNAL MEDICINE

## 2020-01-21 PROCEDURE — 93296 REM INTERROG EVL PM/IDS: CPT | Performed by: INTERNAL MEDICINE

## 2020-01-21 NOTE — LETTER
[address]  [address] [place] [address]  Phone: [phone]  Fax: [phone]            January 21, 2020    Tanja 76      Dear Yair Duarte: This is your CARELINK schedule. Please jhon your calendar with these dates. You can do your checks anytime during the scheduled day. Since we do not do reminder calls, it will be your responsibility to perform the checks on the day it is scheduled. If you have any questions or concerns, please call and ask for Velma Carvalho at [phone].

## 2020-02-10 ENCOUNTER — HOSPITAL ENCOUNTER (OUTPATIENT)
Dept: ULTRASOUND IMAGING | Age: 80
Discharge: HOME | End: 2020-02-10
Payer: MEDICARE

## 2020-02-10 ENCOUNTER — HOSPITAL ENCOUNTER (OUTPATIENT)
Dept: CT IMAGING | Age: 80
Discharge: HOME | End: 2020-02-10
Payer: MEDICARE

## 2020-02-10 PROCEDURE — 93971 EXTREMITY STUDY: CPT

## 2020-02-10 PROCEDURE — 73700 CT LOWER EXTREMITY W/O DYE: CPT

## 2020-02-19 PROBLEM — I87.8 VENOUS STASIS: Status: ACTIVE | Noted: 2020-02-19

## 2020-04-27 ENCOUNTER — PROCEDURE VISIT (OUTPATIENT)
Dept: CARDIOLOGY CLINIC | Age: 80
End: 2020-04-27
Payer: MEDICARE

## 2020-04-27 PROCEDURE — 93296 REM INTERROG EVL PM/IDS: CPT | Performed by: INTERNAL MEDICINE

## 2020-04-27 PROCEDURE — 93294 REM INTERROG EVL PM/LDLS PM: CPT | Performed by: INTERNAL MEDICINE

## 2020-08-04 ENCOUNTER — PROCEDURE VISIT (OUTPATIENT)
Dept: CARDIOLOGY CLINIC | Age: 80
End: 2020-08-04
Payer: MEDICARE

## 2020-08-04 PROCEDURE — 93294 REM INTERROG EVL PM/LDLS PM: CPT | Performed by: INTERNAL MEDICINE

## 2020-08-04 PROCEDURE — 93296 REM INTERROG EVL PM/IDS: CPT | Performed by: INTERNAL MEDICINE

## 2020-08-10 ENCOUNTER — HOSPITAL ENCOUNTER (OUTPATIENT)
Dept: CT IMAGING | Age: 80
Discharge: HOME | End: 2020-08-10
Payer: MEDICARE

## 2020-08-10 PROCEDURE — 72125 CT NECK SPINE W/O DYE: CPT

## 2020-11-08 PROCEDURE — 93294 REM INTERROG EVL PM/LDLS PM: CPT | Performed by: INTERNAL MEDICINE

## 2020-11-08 PROCEDURE — 93296 REM INTERROG EVL PM/IDS: CPT | Performed by: INTERNAL MEDICINE

## 2020-11-09 ENCOUNTER — TELEPHONE (OUTPATIENT)
Dept: CARDIOLOGY CLINIC | Age: 80
End: 2020-11-09

## 2020-11-09 ENCOUNTER — PROCEDURE VISIT (OUTPATIENT)
Dept: CARDIOLOGY CLINIC | Age: 80
End: 2020-11-09
Payer: MEDICARE

## 2020-11-09 ENCOUNTER — OFFICE VISIT (OUTPATIENT)
Dept: CARDIOLOGY CLINIC | Age: 80
End: 2020-11-09
Payer: MEDICARE

## 2020-11-09 VITALS
SYSTOLIC BLOOD PRESSURE: 160 MMHG | HEART RATE: 72 BPM | BODY MASS INDEX: 34.58 KG/M2 | HEIGHT: 66 IN | WEIGHT: 215.2 LBS | DIASTOLIC BLOOD PRESSURE: 98 MMHG

## 2020-11-09 PROCEDURE — 1036F TOBACCO NON-USER: CPT | Performed by: INTERNAL MEDICINE

## 2020-11-09 PROCEDURE — G8427 DOCREV CUR MEDS BY ELIG CLIN: HCPCS | Performed by: INTERNAL MEDICINE

## 2020-11-09 PROCEDURE — G8417 CALC BMI ABV UP PARAM F/U: HCPCS | Performed by: INTERNAL MEDICINE

## 2020-11-09 PROCEDURE — G8484 FLU IMMUNIZE NO ADMIN: HCPCS | Performed by: INTERNAL MEDICINE

## 2020-11-09 PROCEDURE — 1123F ACP DISCUSS/DSCN MKR DOCD: CPT | Performed by: INTERNAL MEDICINE

## 2020-11-09 PROCEDURE — 99214 OFFICE O/P EST MOD 30 MIN: CPT | Performed by: INTERNAL MEDICINE

## 2020-11-09 PROCEDURE — 4040F PNEUMOC VAC/ADMIN/RCVD: CPT | Performed by: INTERNAL MEDICINE

## 2020-11-09 NOTE — LETTER
[address]  [address] [place] [address]  Phone: [phone]  Fax: [phone]            November 9, 2020    Tanja 76      Dear Jenni Card: This is your CARELINK schedule. Please jhon your calendar with these dates. You can do your checks anytime during the scheduled day. Since we do not do reminder calls, it will be your responsibility to perform the checks on the day it is scheduled. If you have any questions or concerns, please call and ask for Nabil Trimble at [phone].

## 2021-02-15 ENCOUNTER — PROCEDURE VISIT (OUTPATIENT)
Dept: CARDIOLOGY CLINIC | Age: 81
End: 2021-02-15
Payer: MEDICARE

## 2021-02-15 DIAGNOSIS — Z95.0 CARDIAC PACEMAKER IN SITU: Primary | ICD-10-CM

## 2021-02-15 PROCEDURE — 93296 REM INTERROG EVL PM/IDS: CPT | Performed by: INTERNAL MEDICINE

## 2021-02-15 PROCEDURE — 93294 REM INTERROG EVL PM/LDLS PM: CPT | Performed by: INTERNAL MEDICINE

## 2021-05-18 ENCOUNTER — OFFICE VISIT (OUTPATIENT)
Dept: CARDIOLOGY CLINIC | Age: 81
End: 2021-05-18
Payer: MEDICARE

## 2021-05-18 VITALS
SYSTOLIC BLOOD PRESSURE: 138 MMHG | BODY MASS INDEX: 35.39 KG/M2 | HEART RATE: 72 BPM | HEIGHT: 66 IN | WEIGHT: 220.2 LBS | DIASTOLIC BLOOD PRESSURE: 88 MMHG

## 2021-05-18 DIAGNOSIS — Z95.0 CARDIAC PACEMAKER IN SITU: Primary | ICD-10-CM

## 2021-05-18 PROCEDURE — 1123F ACP DISCUSS/DSCN MKR DOCD: CPT | Performed by: INTERNAL MEDICINE

## 2021-05-18 PROCEDURE — G8417 CALC BMI ABV UP PARAM F/U: HCPCS | Performed by: INTERNAL MEDICINE

## 2021-05-18 PROCEDURE — 4040F PNEUMOC VAC/ADMIN/RCVD: CPT | Performed by: INTERNAL MEDICINE

## 2021-05-18 PROCEDURE — 1036F TOBACCO NON-USER: CPT | Performed by: INTERNAL MEDICINE

## 2021-05-18 PROCEDURE — 99213 OFFICE O/P EST LOW 20 MIN: CPT | Performed by: INTERNAL MEDICINE

## 2021-05-18 PROCEDURE — G8427 DOCREV CUR MEDS BY ELIG CLIN: HCPCS | Performed by: INTERNAL MEDICINE

## 2021-05-18 RX ORDER — POTASSIUM CHLORIDE 20 MEQ/1
1 TABLET, EXTENDED RELEASE ORAL DAILY
COMMUNITY
Start: 2020-11-04

## 2021-05-18 RX ORDER — DULOXETIN HYDROCHLORIDE 30 MG/1
30 CAPSULE, DELAYED RELEASE ORAL DAILY
COMMUNITY
Start: 2020-11-04

## 2021-05-18 RX ORDER — FERROUS SULFATE 325(65) MG
325 TABLET ORAL DAILY
COMMUNITY
Start: 2021-05-11

## 2021-05-18 RX ORDER — DICLOFENAC SODIUM AND MISOPROSTOL 75; 200 MG/1; UG/1
1 TABLET, DELAYED RELEASE ORAL 2 TIMES DAILY
COMMUNITY
Start: 2021-05-11

## 2021-05-18 NOTE — PATIENT INSTRUCTIONS
**It is YOUR responsibilty to bring medication bottles and/or updated medication list to [address] [place] [address]. This will allow us to better serve you and all your healthcare needs**      Please be informed that if you contact our office outside of normal business hours the physician on call cannot help with any scheduling or rescheduling issues, procedure instruction questions or any type of medication issue. We advise you for any urgent/emergency that you go to the nearest emergency room!     PLEASE CALL OUR OFFICE DURING NORMAL BUSINESS HOURS    Monday - Friday   8 am to 5 pm    [place]: [address]: [phone]    [place]:  [phone]

## 2021-05-18 NOTE — PROGRESS NOTES
Brenda Alfaro  is a  Established patient  ,79 y.o.   male here for evaluation of the following chief complaint(s):    Here for 6 m fu        SUBJECTIVE/OBJECTIVE:  HPI : h/o  Cad, htn, hyperlipidimea, PPM , PAF now here  Has no cardiac complains    Review of Systems    neg    Vitals:    05/18/21 1635   BP: 138/88   Pulse: 72   Weight: 220 lb 3.2 oz (99.9 kg)   Height: 5' 6\" (1.676 m)     /88   Pulse 72   Ht 5' 6\" (1.676 m)   Wt 220 lb 3.2 oz (99.9 kg)   BMI 35.54 kg/m²   No flowsheet data found. Wt Readings from Last 3 Encounters:   05/18/21 220 lb 3.2 oz (99.9 kg)   11/09/20 215 lb 3.2 oz (97.6 kg)   03/04/20 213 lb (96.6 kg)     Body mass index is 35.54 kg/m². Physical Exam     Neck: JVD  no    Lungs : clear    Cardio : Si and S2 audilble      Ext: edema  tr    All pertinent data reviewed  y    Meds : reviewed   y      Tests ordered    Ppm check    ASSESSMENT/PLAN:               -     CORONARY ARTERY DISEASE:  asymptomatic     All available  tests in chart reviewed. Management discussed . Testing ordered  no                                 -  Hypertension: Patients blood pressure is normal. Patient is advised about low sodium diet. Present medical regimen will not be changed. -  LIPID MANAGEMENT:  Importance of lipid levels discussed with patient   and patient was given dietary advice. NCEP- ATP III guidelines reviewed with patient. -   Changes  in medicines made: No               - Atrial fibrillation, pt is  compliant with meds. Patient does not have symptoms from atrial fibrillation          · PACER  ANALYSIS:    Pacer analysis is reviewed and filed in the pacer chart. Analysis is consistent with normal  function with stable leads and appropriate battery status for the age of the device. Remaining average battery life is 29 mos. Patient is using pacer A pace93%, V pace. 1%. Recommend continued every three month check and follow up office visit as scheduled.     Shayna Heaton MD, 5/19/2021 8:19 AM         An electronic signature was used to authenticate this note.     --George Garrison MD

## 2021-05-24 ENCOUNTER — PROCEDURE VISIT (OUTPATIENT)
Dept: CARDIOLOGY CLINIC | Age: 81
End: 2021-05-24
Payer: MEDICARE

## 2021-05-24 DIAGNOSIS — Z95.0 CARDIAC PACEMAKER IN SITU: Primary | ICD-10-CM

## 2021-05-24 PROCEDURE — 93294 REM INTERROG EVL PM/LDLS PM: CPT | Performed by: INTERNAL MEDICINE

## 2021-05-24 PROCEDURE — 93296 REM INTERROG EVL PM/IDS: CPT | Performed by: INTERNAL MEDICINE

## 2021-06-21 ENCOUNTER — TELEPHONE (OUTPATIENT)
Dept: CARDIOLOGY CLINIC | Age: 81
End: 2021-06-21

## 2021-06-21 NOTE — TELEPHONE ENCOUNTER
Spoke with daughter and advised her that pacemaker report is fine and that I would have Mary call her. She agreed.

## 2021-06-21 NOTE — TELEPHONE ENCOUNTER
Daughter in law called and states pt is having a tingling in chest shoulder up to ear, pt has a ppm pt is sending a transmission  . Please advise.

## 2021-06-22 ENCOUNTER — OFFICE VISIT (OUTPATIENT)
Dept: CARDIOLOGY CLINIC | Age: 81
End: 2021-06-22
Payer: MEDICARE

## 2021-06-22 VITALS
DIASTOLIC BLOOD PRESSURE: 70 MMHG | WEIGHT: 219.6 LBS | SYSTOLIC BLOOD PRESSURE: 120 MMHG | BODY MASS INDEX: 35.29 KG/M2 | HEART RATE: 74 BPM | HEIGHT: 66 IN

## 2021-06-22 DIAGNOSIS — Z95.0 CARDIAC PACEMAKER IN SITU: Primary | ICD-10-CM

## 2021-06-22 DIAGNOSIS — R07.2 PRECORDIAL CHEST PAIN: ICD-10-CM

## 2021-06-22 DIAGNOSIS — I10 ESSENTIAL HYPERTENSION: ICD-10-CM

## 2021-06-22 DIAGNOSIS — E78.5 DYSLIPIDEMIA: ICD-10-CM

## 2021-06-22 PROCEDURE — 1036F TOBACCO NON-USER: CPT | Performed by: INTERNAL MEDICINE

## 2021-06-22 PROCEDURE — G8417 CALC BMI ABV UP PARAM F/U: HCPCS | Performed by: INTERNAL MEDICINE

## 2021-06-22 PROCEDURE — G8427 DOCREV CUR MEDS BY ELIG CLIN: HCPCS | Performed by: INTERNAL MEDICINE

## 2021-06-22 PROCEDURE — 1123F ACP DISCUSS/DSCN MKR DOCD: CPT | Performed by: INTERNAL MEDICINE

## 2021-06-22 PROCEDURE — 99214 OFFICE O/P EST MOD 30 MIN: CPT | Performed by: INTERNAL MEDICINE

## 2021-06-22 PROCEDURE — 4040F PNEUMOC VAC/ADMIN/RCVD: CPT | Performed by: INTERNAL MEDICINE

## 2021-06-22 NOTE — PROGRESS NOTES
Ever Morales  is a  Established patient  ,79 y.o.   male here for evaluation of the following chief complaint(s):    Here for fu on PPM check        SUBJECTIVE/OBJECTIVE:  HPI : h/o  Cad, htn, hyperlipidimea, PPM , PAF now here  Has been having lupillo jaw pain , shocks, no CP, mild SOB, has pressure in neck, feels worms in chest    Review of Systems    Mild CP    Vitals:    06/22/21 1456 06/22/21 1502 06/22/21 1503   BP: 122/70 130/68 120/70   Position: Sitting Supine Standing   Pulse: 74 74 74   Weight: 219 lb 9.6 oz (99.6 kg)     Height: 5' 6\" (1.676 m)       /70 (Position: Standing)   Pulse 74   Ht 5' 6\" (1.676 m)   Wt 219 lb 9.6 oz (99.6 kg)   BMI 35.44 kg/m²   No flowsheet data found. Wt Readings from Last 3 Encounters:   06/22/21 219 lb 9.6 oz (99.6 kg)   05/18/21 220 lb 3.2 oz (99.9 kg)   11/09/20 215 lb 3.2 oz (97.6 kg)     Body mass index is 35.44 kg/m². Physical Exam     Neck: JVD  no    Lungs : clear    Cardio : Si and S2 audilble      Ext: edema  tr    All pertinent data reviewed  y    Meds : reviewed   y      Tests ordered     y  ASSESSMENT/PLAN:               -     CORONARY ARTERY DISEASE:  symptomatic     All available  tests in chart reviewed. Management discussed . Testing ordered   lexiscan                On asa                 -  Hypertension: Patients blood pressure is normal. Patient is advised about low sodium diet. Present medical regimen will not be changed. on metorolol    -  LIPID MANAGEMENT:  Importance of lipid levels discussed with patient   and patient was given dietary advice. NCEP- ATP III guidelines reviewed with patient. -   Changes  in medicines made: No       On crestor          - Atrial fibrillation, pt is  compliant with meds. Patient does not have symptoms from atrial fibrillation     On eliquis        · PACER  ANALYSIS:  On [email or web address]          An electronic signature was used to authenticate this note.     --Funmilayo Pham MD

## 2021-06-22 NOTE — PATIENT INSTRUCTIONS
Please hold on to these instructions the  will call you within 1-9 business days when we receive authorization from your insurance. Nuclear Stress Test    WHAT TO EXPECT:   ? You will need to confirm the test or it could be cancelled. ? This test will take approximately 2 hours: 1 hour in the AM &    1 hour in the PM. You will be given a time by the Technologist after the first part is completed to come back. ? You will be given a medication, through an IV in the hand, this will safely simulate exercise. This IV is also needed to inject the radioactive isotope unless you are able toe walk the treadmill. ? You will receive an injection in the AM & PM before the pictures. ?  Using a special camera, you will have one set of pictures of your heart taken in the AM and a set of pictures in the PM.     PREPARATION FOR TEST:  ? Eat a light breakfast such as water or juice and toast.  ? If you are DIABETIC: Eat a normal breakfast with NO CAFFEINE and take your insulin as normal.   ? AVOID ALL FOODS & DRINKS containing CAFFEINE 12 HOURS PRIOR TO THE TEST: Including coffee, Tea, Neil and other soft drinks even those labeled  caffeine free or decaffeinated

## 2021-07-07 ENCOUNTER — PROCEDURE VISIT (OUTPATIENT)
Dept: CARDIOLOGY CLINIC | Age: 81
End: 2021-07-07
Payer: MEDICARE

## 2021-07-07 DIAGNOSIS — R06.02 SOB (SHORTNESS OF BREATH): Primary | ICD-10-CM

## 2021-07-07 DIAGNOSIS — R07.2 PRECORDIAL CHEST PAIN: ICD-10-CM

## 2021-07-07 LAB
LV EF: 53 %
LVEF MODALITY: NORMAL

## 2021-07-07 PROCEDURE — 78452 HT MUSCLE IMAGE SPECT MULT: CPT | Performed by: INTERNAL MEDICINE

## 2021-07-07 PROCEDURE — 93015 CV STRESS TEST SUPVJ I&R: CPT | Performed by: INTERNAL MEDICINE

## 2021-07-07 PROCEDURE — A9500 TC99M SESTAMIBI: HCPCS | Performed by: INTERNAL MEDICINE

## 2021-07-08 ENCOUNTER — TELEPHONE (OUTPATIENT)
Dept: CARDIOLOGY CLINIC | Age: 81
End: 2021-07-08

## 2021-07-08 NOTE — TELEPHONE ENCOUNTER
Results given to the patient, Patient thinks he needs to come in sooner than the end of July, so he is going to talk to his daughter in law, and she will call back to set up an appointment.      Supervising physician Dr. Swetha Morales .    Left ventricular perfusion is normal. Left ventricular function is normal.    Left ventricular ejection fraction is 53%.        Recommendation    Continue aggressive lifestyle and risk modification and medical therapy.

## 2021-07-26 ENCOUNTER — OFFICE VISIT (OUTPATIENT)
Dept: CARDIOLOGY CLINIC | Age: 81
End: 2021-07-26
Payer: MEDICARE

## 2021-07-26 VITALS
BODY MASS INDEX: 37.83 KG/M2 | SYSTOLIC BLOOD PRESSURE: 128 MMHG | WEIGHT: 221.6 LBS | HEIGHT: 64 IN | DIASTOLIC BLOOD PRESSURE: 72 MMHG | HEART RATE: 71 BPM

## 2021-07-26 DIAGNOSIS — E78.5 HYPERLIPIDEMIA, UNSPECIFIED HYPERLIPIDEMIA TYPE: ICD-10-CM

## 2021-07-26 DIAGNOSIS — I10 ESSENTIAL HYPERTENSION: Primary | ICD-10-CM

## 2021-07-26 PROCEDURE — G8427 DOCREV CUR MEDS BY ELIG CLIN: HCPCS | Performed by: INTERNAL MEDICINE

## 2021-07-26 PROCEDURE — 1036F TOBACCO NON-USER: CPT | Performed by: INTERNAL MEDICINE

## 2021-07-26 PROCEDURE — 4040F PNEUMOC VAC/ADMIN/RCVD: CPT | Performed by: INTERNAL MEDICINE

## 2021-07-26 PROCEDURE — G8417 CALC BMI ABV UP PARAM F/U: HCPCS | Performed by: INTERNAL MEDICINE

## 2021-07-26 PROCEDURE — 99214 OFFICE O/P EST MOD 30 MIN: CPT | Performed by: INTERNAL MEDICINE

## 2021-07-26 PROCEDURE — 1123F ACP DISCUSS/DSCN MKR DOCD: CPT | Performed by: INTERNAL MEDICINE

## 2021-07-26 RX ORDER — AMLODIPINE BESYLATE 5 MG/1
TABLET ORAL
COMMUNITY
Start: 2021-05-11

## 2021-07-26 NOTE — PROGRESS NOTES
Kylee Marques  is a  Established patient  ,79 y.o.   male here for evaluation of the following chief complaint(s):    Here for fu on stress        SUBJECTIVE/OBJECTIVE:  HPI : h/o  Cad, htn, hyperlipidimea, PPM , PAF now here  Has  No CP  Has neck pain and numbness in hands    Review of Systems   neckpain    Vitals:    07/26/21 1403   BP: 128/72   Pulse: 71   Weight: 221 lb 9.6 oz (100.5 kg)   Height: 5' 4\" (1.626 m)     /72   Pulse 71   Ht 5' 4\" (1.626 m)   Wt 221 lb 9.6 oz (100.5 kg)   BMI 38.04 kg/m²   No flowsheet data found. Wt Readings from Last 3 Encounters:   07/26/21 221 lb 9.6 oz (100.5 kg)   06/22/21 219 lb 9.6 oz (99.6 kg)   05/18/21 220 lb 3.2 oz (99.9 kg)     Body mass index is 38.04 kg/m². Physical Exam     Neck: JVD  no    Lungs : clear    Cardio : Si and S2 audilble      Ext: edema  tr    All pertinent data reviewed  y    Meds : reviewed   y      Tests ordered     n    ASSESSMENT/PLAN:               -     CORONARY ARTERY DISEASE:  asymptomatic     All available  tests in chart reviewed. Management discussed . Testing ordered   no                On asa                 -  Hypertension: Patients blood pressure is normal. Patient is advised about low sodium diet. Present medical regimen will not be changed. on metorolol    -  LIPID MANAGEMENT:  Importance of lipid levels discussed with patient   and patient was given dietary advice. NCEP- ATP III guidelines reviewed with patient. -   Changes  in medicines made: No       On crestor          - Atrial fibrillation, pt is  compliant with meds. Patient does not have symptoms from atrial fibrillation     On eliquis    - ? Neuro issues , with numbness      · PACER  ANALYSIS:  On [employer]          An electronic signature was used to authenticate this note.     --Cristin De La Rosa MD

## 2021-08-30 ENCOUNTER — PROCEDURE VISIT (OUTPATIENT)
Dept: CARDIOLOGY CLINIC | Age: 81
End: 2021-08-30
Payer: MEDICARE

## 2021-08-30 DIAGNOSIS — Z95.0 CARDIAC PACEMAKER IN SITU: Primary | ICD-10-CM

## 2021-08-30 PROCEDURE — 93296 REM INTERROG EVL PM/IDS: CPT | Performed by: INTERNAL MEDICINE

## 2021-08-30 PROCEDURE — 93294 REM INTERROG EVL PM/LDLS PM: CPT | Performed by: INTERNAL MEDICINE

## 2021-11-12 ENCOUNTER — HOSPITAL ENCOUNTER (EMERGENCY)
Age: 81
Discharge: HOME | End: 2021-11-12
Attending: EMERGENCY MEDICINE
Payer: MEDICARE

## 2021-11-12 ENCOUNTER — APPOINTMENT (OUTPATIENT)
Dept: GENERAL RADIOLOGY | Age: 81
End: 2021-11-12
Payer: MEDICARE

## 2021-11-12 VITALS
WEIGHT: 215.2 LBS | DIASTOLIC BLOOD PRESSURE: 75 MMHG | BODY MASS INDEX: 36.74 KG/M2 | SYSTOLIC BLOOD PRESSURE: 158 MMHG | HEART RATE: 97 BPM | TEMPERATURE: 97.9 F | HEIGHT: 64 IN | OXYGEN SATURATION: 97 % | RESPIRATION RATE: 18 BRPM

## 2021-11-12 DIAGNOSIS — M79.641 RIGHT HAND PAIN: Primary | ICD-10-CM

## 2021-11-12 PROCEDURE — 73130 X-RAY EXAM OF HAND: CPT

## 2021-11-12 PROCEDURE — 99284 EMERGENCY DEPT VISIT MOD MDM: CPT

## 2021-11-12 PROCEDURE — 6370000000 HC RX 637 (ALT 250 FOR IP): Performed by: EMERGENCY MEDICINE

## 2021-11-12 RX ORDER — OXYCODONE HYDROCHLORIDE AND ACETAMINOPHEN 5; 325 MG/1; MG/1
1 TABLET ORAL ONCE
Status: COMPLETED | OUTPATIENT
Start: 2021-11-12 | End: 2021-11-12

## 2021-11-12 RX ORDER — PREDNISONE 20 MG/1
60 TABLET ORAL DAILY
Qty: 12 TABLET | Refills: 0 | Status: SHIPPED | OUTPATIENT
Start: 2021-11-12 | End: 2021-11-16

## 2021-11-12 RX ORDER — PREDNISONE 20 MG/1
60 TABLET ORAL ONCE
Status: COMPLETED | OUTPATIENT
Start: 2021-11-12 | End: 2021-11-12

## 2021-11-12 RX ORDER — HYDROCODONE BITARTRATE AND ACETAMINOPHEN 5; 325 MG/1; MG/1
1-2 TABLET ORAL EVERY 6 HOURS PRN
Qty: 10 TABLET | Refills: 0 | Status: SHIPPED | OUTPATIENT
Start: 2021-11-12 | End: 2021-11-15

## 2021-11-12 RX ADMIN — OXYCODONE AND ACETAMINOPHEN 1 TABLET: 5; 325 TABLET ORAL at 21:24

## 2021-11-12 RX ADMIN — PREDNISONE 60 MG: 20 TABLET ORAL at 22:06

## 2021-11-12 ASSESSMENT — PAIN SCALES - GENERAL
PAINLEVEL_OUTOF10: 10
PAINLEVEL_OUTOF10: 10

## 2021-11-12 ASSESSMENT — PAIN DESCRIPTION - LOCATION: LOCATION: HAND

## 2021-11-12 ASSESSMENT — PAIN DESCRIPTION - ORIENTATION: ORIENTATION: RIGHT

## 2021-11-13 NOTE — ED TRIAGE NOTES
Pt states he has right hand pain for several weeks but today the pain is severe and his ROM is limited

## 2021-11-13 NOTE — ED PROVIDER NOTES
Triage Chief Complaint:   Hand Pain (NKI)    [employer]:  Diona Lanes is a 79 y.o. male that presents with a few days of gradually worsening right-sided hand and wrist pain. States that he has had increasing stiffness, severe pain with any kind of motion of his wrist, hand or fingers. Denies any recent  injury. Denies any motor or sensory deficits. States that he took ibuprofen a few hours ago but pain is continuing to worsen. States that he knows he has arthritis in his hands but has never had pain like this before. Denies any fevers, nausea or vomiting. ROS:  At least 6 systems reviewed and otherwise acutely negative except as in the [address] Past Medical History:   Diagnosis Date    Aortic regurgitation 04/03/2012    4/3/2012-mild; 2/7/2011-mild to moderate    Arrhythmia 02/22/2011 2/22/2011-tachycardia episode during cardiac cath-48 HR Holter monitor normal    Blisters of multiple sites 10/05/2016    CAD (coronary artery disease)     sees Dr Dino Burt, prior Dr Scott Courser;   Sil Myers Cardiac pacemaker 04/2001 4/2001-[employer] PPM Integrity DDD Model 5342    Congestive heart failure [facility])     Family history of coronary artery disease     Father-MI    GERD (gastroesophageal reflux disease)     H/O cardiac catheterization 5/2/2012, 2/22/2011, 3/4/2010, 2/2001 5/2/2012-C-No evid of signif CAD; Normal peripheral angiogram; 2/22/2011-No signif CAD, stent patent, had tachycardic event; 3/4/2010-Severe stenosis and re in-stent stenosis of LAD    H/O cardiovascular stress test 4/16/2012, 2/7/2011 4/16/2012-Normal Lexiscan study. No evid of ischemia. Attenuation artifact in inferior region of myocardium. EF 58%.      H/O cardiovascular stress test 03/04/2015    normal, no ischemia, EF62%    H/O cardiovascular stress test 12/06/2017    EF60% normal study    H/O Doppler ultrasound 02/07/2011 2/7/2011-CAROTID US-Normal    H/O Doppler ultrasound 05/24/2013    periphal normal    H/O Doppler ultrasound 4/2001-[employer] PPM Integrity DDD Model 5342    CARPAL TUNNEL RELEASE Bilateral 1976    CORONARY ANGIOPLASTY WITH STENT PLACEMENT  3/4/2010, 2/2001    3/4/2010-PTCA with 2.75 X 38 mm stent to LAD; 2/2001-PTCA with stent to LAD;    DIAGNOSTIC CARDIAC CATH LAB PROCEDURE  5/2/2012,2/22/2011, 3/4/2010, 2/2001 5/2/2012-[facility]-No evid of signif CAD; Normal peripheral angiogram; 2/22/2011-No signif CAD, stent patent, had tachycardic event; 3/4/2010-Severe stenosis and re in-stent stenosis of LAD    FINGER TRIGGER RELEASE Left 1994    thumb    HAND TENDON SURGERY Left 1976    Left middle finger    HERNIA REPAIR Bilateral 1993    bilateral inguinal hernias    JOINT REPLACEMENT      knees bilaterally-Right 10/2009; 1/2008-Left    KNEE ARTHROSCOPY  11/2005, 1989 11/2005, 1989-Right knee    LUMBAR DISC ARTHROPLASTY  07/08/2016    OTHER SURGICAL HISTORY  5/2/2012 5/2/2012-PERIPHERAL ANGIOGRAM-Normal    UPPER GASTROINTESTINAL ENDOSCOPY  5/3/2001    5/3/3218-[facility])     Family History   Problem Relation Age of Onset    Heart Disease Mother     High Blood Pressure Mother     Stroke Father     Coronary Art Dis Father         MI    Other Brother         accidental   Donaldson Mu-ism Brain Cancer Paternal Grandfather      Social History     Socioeconomic History    Marital status:       Spouse name: Not on file    Number of children: Not on file    Years of education: Not on file    Highest education level: Not on file   Occupational History    Not on file   Tobacco Use    Smoking status: Never Smoker    Smokeless tobacco: Never Used   Vaping Use    Vaping Use: Never used   Substance and Sexual Activity    Alcohol use: No     Alcohol/week: 0.0 standard drinks    Drug use: No    Sexual activity: Yes     Partners: Female     Comment:     Other Topics Concern    Not on file   Social History Narrative    Not on file     Social Determinants of Health     Financial Resource Strain: potassium chloride (KLOR-CON M) 20 MEQ extended release tablet Take 1 tablet by mouth daily      diclofenac-miSOPROStol (ARTHROTEC) 75-0.2 MG per tablet Take 1 tablet by mouth 2 times daily      tiZANidine (ZANAFLEX) 4 MG tablet TAKE 1/2 TABLET BY MOUTH THREE TIMES A DAY FOR 2 DAYS, THEN TAKE 1 TABLET BY MOUTH THREE TIMES A DAY AFTER  2    losartan (COZAAR) 50 MG tablet Take 50 mg by mouth daily       dexlansoprazole (DEXILANT) 60 MG CPDR delayed release capsule Take 60 mg by mouth daily      zolpidem (AMBIEN) 10 MG tablet Take by mouth nightly as needed for Sleep      furosemide (LASIX) 40 MG tablet Take 1 tablet by mouth 2 times daily 180 tablet 3    dronedarone hcl (MULTAQ) 400 MG TABS Take 400 mg by mouth 2 times daily (with meals)      nitroGLYCERIN (NITROLINGUAL) 0.4 MG/SPRAY 0.4 mg spray Place 1 spray under the tongue every 5 minutes as needed for Chest pain      CPAP Machine MISC by Does not apply route nightly      apixaban (ELIQUIS) 5 MG TABS tablet Take 1 tablet by mouth 2 times daily 60 tablet 3    rosuvastatin (CRESTOR) 20 MG tablet Take 20 mg by mouth daily.  pramipexole (MIRAPEX) 1 MG tablet   Take 0.5 mg by mouth 2 times daily Patient takes 1/2 tablet in the am and 1 1/2 tablet at night.  metoprolol (LOPRESSOR) 50 MG tablet Take 50 mg by mouth 2 times daily.  modafinil (PROVIGIL) 100 MG tablet Take 100 mg by mouth daily.  esomeprazole (NEXIUM) 40 MG capsule Take 40 mg by mouth 2 times daily.  aspirin 81 MG chewable tablet Take 81 mg by mouth daily.          Allergies   Allergen Reactions    Ceclor [Cefaclor] Rash    Celebrex [Celecoxib] Rash    Pcn [Penicillins] Rash    Relafen [Nabumetone] Rash       Nursing Notes Reviewed    Physical Exam:  ED Triage Vitals [11/12/21 2108]   Enc Vitals Group      BP (!) 158/75      Pulse 97      Resp 18      Temp 97.9 °F (36.6 °C)      Temp src       SpO2 97 %      Weight 215 lb 3.2 oz (97.6 kg)      Height 5' 4\" (1.626 m)      Head Circumference       Peak Flow       Pain Score       Pain Loc       Pain Edu? Excl. in [address]? GENERAL APPEARANCE: Awake and alert. Cooperative. No acute distress. HEAD: Normocephalic. Atraumatic. EYES: EOM's grossly intact. Sclera anicteric. ENT: Mucous membranes are moist. Tolerates saliva. No trismus. NECK: No meningismus. HEART:  Extremities pink  LUNGS: Respirations unlabored. Even chest rise bilaterally  ABDOMEN: Non distended. 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 There is no significant swelling compared to patient's left hand and there is no erythema, induration, fluctuance or crepitus. Palpable radial pulse. Sensory distribution of radial/median/ulnar nerves intact. Patient is diffusely tender to palpation over the hand and fingers. Limited range of motion secondary to pain. Patient's hand is warm with normal capillary refill at the fingertips. SKIN: Dry  NEUROLOGICAL: No gross facial drooping. Moves all 4 extremities spontaneously. PSYCHIATRIC: Normal mood. I have reviewed and interpreted all of the currently available lab results from this visit (if applicable):  No results found for this visit on 11/12/21. Radiographs (if obtained):  [] The following radiograph was interpreted by myself in the absence of a radiologist:  [x] Radiologist's Report Reviewed:    EKG (if obtained): (All EKG's are interpreted by myself in the absence of a cardiologist)    MDM:  Plan of care is discussed thoroughly with the patient and family if present. If performed, all imaging and lab work also discussed with patient. All relevant prior results and chart reviewed if available. Patient presents as above. Vital signs are normal.  He is neurovascularly intact.   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  No evidence of infectious process at this time and I feel that the patient's pain is likely secondary to underlying osteoarthritis. No evidence of ischemic process at this time. No obvious clinical evidence of gout at this time. Patient given Percocet for pain control here. X-rays unremarkable for any evidence of acute abnormality. Will trial course of prednisone and Norco. Patient has follow-up scheduled next week with PCP. Clinical Impression:  1.  Right hand pain      (Please note that portions of this note may have been completed with a voice recognition program. Efforts were made to edit the dictations but occasionally words are mis-transcribed.)    MD Marcell Chaudhry MD  11/12/21 [address] Kim MD  11/12/21 0652

## 2022-01-03 ENCOUNTER — TELEPHONE (OUTPATIENT)
Dept: CARDIOLOGY CLINIC | Age: 82
End: 2022-01-03

## 2022-01-03 ENCOUNTER — PROCEDURE VISIT (OUTPATIENT)
Dept: CARDIOLOGY CLINIC | Age: 82
End: 2022-01-03
Payer: MEDICARE

## 2022-01-03 DIAGNOSIS — Z95.0 CARDIAC PACEMAKER IN SITU: Primary | ICD-10-CM

## 2022-01-03 PROCEDURE — 93294 REM INTERROG EVL PM/LDLS PM: CPT | Performed by: INTERNAL MEDICINE

## 2022-01-03 PROCEDURE — 93296 REM INTERROG EVL PM/IDS: CPT | Performed by: INTERNAL MEDICINE

## 2022-01-03 NOTE — LETTER
[address]  [address] [place] [address]  Phone: [phone]  Fax: [phone]            January 3, 2022    Tanja Wilson      Dear Artist Giovanni: This is your CAREL[employer] schedule. Please jhon your calendar with these dates. You can do your checks anytime during the scheduled day. Since we do not do reminder calls, it will be your responsibility to perform the checks on the day it is scheduled. If you have any questions or concerns, please call and ask for TAB CASAS at [phone].

## 2022-01-31 ENCOUNTER — OFFICE VISIT (OUTPATIENT)
Dept: CARDIOLOGY CLINIC | Age: 82
End: 2022-01-31
Payer: MEDICARE

## 2022-01-31 VITALS
BODY MASS INDEX: 38.68 KG/M2 | SYSTOLIC BLOOD PRESSURE: 128 MMHG | HEIGHT: 64 IN | DIASTOLIC BLOOD PRESSURE: 72 MMHG | HEART RATE: 83 BPM | WEIGHT: 226.6 LBS

## 2022-01-31 DIAGNOSIS — R07.2 PRECORDIAL CHEST PAIN: Primary | ICD-10-CM

## 2022-01-31 PROCEDURE — 1036F TOBACCO NON-USER: CPT | Performed by: INTERNAL MEDICINE

## 2022-01-31 PROCEDURE — G8484 FLU IMMUNIZE NO ADMIN: HCPCS | Performed by: INTERNAL MEDICINE

## 2022-01-31 PROCEDURE — 1123F ACP DISCUSS/DSCN MKR DOCD: CPT | Performed by: INTERNAL MEDICINE

## 2022-01-31 PROCEDURE — G8417 CALC BMI ABV UP PARAM F/U: HCPCS | Performed by: INTERNAL MEDICINE

## 2022-01-31 PROCEDURE — 99214 OFFICE O/P EST MOD 30 MIN: CPT | Performed by: INTERNAL MEDICINE

## 2022-01-31 PROCEDURE — G8427 DOCREV CUR MEDS BY ELIG CLIN: HCPCS | Performed by: INTERNAL MEDICINE

## 2022-01-31 PROCEDURE — 4040F PNEUMOC VAC/ADMIN/RCVD: CPT | Performed by: INTERNAL MEDICINE

## 2022-01-31 NOTE — PROGRESS NOTES
CARDIOLOGY NOTE      1/31/2022    RE: Oscar Del Valle  (1940)                               TO:  Dr. Ritesh Rowley MD            Mk Bennett is a 79 y.o. male who was seen today for management of coronary artery disease                                    HPI:                   Pt has h/o coronary artery disease, hypertension, hyperlipidemia, permanent pacemaker, atrial fibrillation, seen today for follow-up.  Pt has been having increasing shortness of breath which per patient has been getting worse over the past few weeks has no chest pain however has no cough or fever    Oscar Del Valle has the following history recorded in care path:  Patient Active Problem List    Diagnosis Date Noted    Syncope and collapse 10/24/2017    Blisters of multiple sites 10/05/2016    Hematuria     Abdominal discomfort     S/P ablation of atrial fibrillation     S/P ablation of atrial flutter     Atrial fibrillation ([address]     History of cardiac monitoring 06/01/2015    [facility]     HTN (hypertension)     Tachycardia     Congestive heart failure ([address]     Pacemaker     CAD (coronary artery disease) 02/23/2015    Swelling of calf 02/18/2014    Pacer at end of battery life 05/29/2013    SSS (sick sinus syndrome) ([address] 05/29/2013    Claudication ([address] 05/24/2013    History of PTCA     Hyperlipidemia     Hypertension     Sleep apnea     Venous stasis 02/19/2020    Pulmonary hypertension ([facility] 02/11/2019    JAIMIE treated with BiPAP 12/03/2018    Obesity (BMI 35.0-39.9 without comorbidity) 12/03/2018    Excessive daytime sleepiness 12/03/2018    SOB (shortness of breath) on exertion 12/03/2018     Current Outpatient Medications   Medication Sig Dispense Refill    amLODIPine (NORVASC) 5 MG tablet       TRAZODONE HCL PO Take by mouth      DULoxetine (CYMBALTA) 30 MG extended release capsule Take 30 mg by mouth daily      ferrous sulfate (IRON 325) 325 (65 Fe) MG tablet Take 325 mg by mouth daily      potassium chloride (KLOR-CON M) 20 MEQ extended release tablet Take 1 tablet by mouth daily      diclofenac-miSOPROStol (ARTHROTEC) 75-0.2 MG per tablet Take 1 tablet by mouth 2 times daily      tiZANidine (ZANAFLEX) 4 MG tablet TAKE 1/2 TABLET BY MOUTH THREE TIMES A DAY FOR 2 DAYS, THEN TAKE 1 TABLET BY MOUTH THREE TIMES A DAY AFTER  2    losartan (COZAAR) 50 MG tablet Take 50 mg by mouth daily       dexlansoprazole (DEXILANT) 60 MG CPDR delayed release capsule Take 60 mg by mouth daily      zolpidem (AMBIEN) 10 MG tablet Take by mouth nightly as needed for Sleep      furosemide (LASIX) 40 MG tablet Take 1 tablet by mouth 2 times daily 180 tablet 3    dronedarone hcl (MULTAQ) 400 MG TABS Take 400 mg by mouth 2 times daily (with meals)      nitroGLYCERIN (NITROLINGUAL) 0.4 MG/SPRAY 0.4 mg spray Place 1 spray under the tongue every 5 minutes as needed for Chest pain      CPAP Machine MISC by Does not apply route nightly      apixaban (ELIQUIS) 5 MG TABS tablet Take 1 tablet by mouth 2 times daily 60 tablet 3    rosuvastatin (CRESTOR) 20 MG tablet Take 20 mg by mouth daily.  pramipexole (MIRAPEX) 1 MG tablet   Take 0.5 mg by mouth 2 times daily Patient takes 1/2 tablet in the am and 1 1/2 tablet at night.  metoprolol (LOPRESSOR) 50 MG tablet Take 50 mg by mouth 2 times daily.  modafinil (PROVIGIL) 100 MG tablet Take 100 mg by mouth daily.  esomeprazole (NEXIUM) 40 MG capsule Take 40 mg by mouth 2 times daily.  aspirin 81 MG chewable tablet Take 81 mg by mouth daily. No current facility-administered medications for this visit.      Allergies: Ceclor [cefaclor], Celebrex [celecoxib], Pcn [penicillins], and Relafen [nabumetone]  Past Medical History:   Diagnosis Date    Aortic regurgitation 04/03/2012    4/3/2012-mild; 2/7/2011-mild to moderate    Arrhythmia 02/22/2011 2/22/2011-tachycardia episode during cardiac cath-48 HR Holter monitor normal    Blisters of multiple sites 10/05/2016    CAD (coronary artery disease)     sees Dr Reyes Covarrubias, prior Dr Stefan Brizuela;   Willa Martinez Cardiac pacemaker 04/2001 4/2001-[employer] PPM Integrity DDD Model 5342    Congestive heart failure [facility])     Family history of coronary artery disease     Father-MI    GERD (gastroesophageal reflux disease)     H/O cardiac catheterization 5/2/2012, 2/22/2011, 3/4/2010, 2/2001 5/2/2012-[facility]-No evid of signif CAD; Normal peripheral angiogram; 2/22/2011-No signif CAD, stent patent, had tachycardic event; 3/4/2010-Severe stenosis and re in-stent stenosis of LAD    H/O cardiovascular stress test 4/16/2012, 2/7/2011 4/16/2012-Normal Lexiscan study. No evid of ischemia. Attenuation artifact in inferior region of myocardium. EF 58%.  H/O cardiovascular stress test 03/04/2015    normal, no ischemia, EF62%    H/O cardiovascular stress test 12/06/2017    EF60% normal study    H/O Doppler ultrasound 02/07/2011 2/7/2011-CAROTID US-Normal    H/O Doppler ultrasound 05/24/2013    periphal normal    H/O Doppler ultrasound 06/18/2014 6/14 Normal    H/O Doppler ultrasound 11/13/15 09/03/15    11/15 WNL 9/15Arterial and Venous Doppler is normal.     H/O echocardiogram 03/04/2015    EF60% Normal LV and systolic function. Trace MR, Aortic, and TR. No other valvulopathy.      H/O echocardiogram 08/08/2016    EF60% mild AR, mild phtn    H/O echocardiogram 11/01/2017    EF55-60% sclerotic, non stenotic AV, mild AR, phtn    H/O peptic ulcer 1970s    Hiatal hernia     History of cardiac monitoring 06/01/2015    Event - Patient noted to have recurrence of atrial fibrillation with RVR on medical therapy on event monitor    History of Doppler carotid echocardiogram 01/15/2020    No hemodynamically significant stenosis in the internal carotid artery bilaterally, Right internal carotid artery exhibits mild homogenous plaque, Left internal exhibits minimal homogenous plaque, totuosity of the bilateral internal carotid arteries    History of Holter monitoring 05/19/2015    24 hour - predominant rhythm was sinus intermittent paced, paced rhythm can not be ruled out d/t pacemaker function being turned off during device setup, limited pacemaker data available    History of nuclear stress test 11/10/2016    lexiscan-normal,EF70%    HX OTHER MEDICAL 05/02/2012 5/2/2012-PERIPHERAL ANGIOGRAM-Normal-Dr Rudd Smoker    HX OTHER MEDICAL 03/02/2011    3/2/2011-48 HR HOLTER MONITOR-Pred Rhythm is sinus rhythm. No arrhythmias.  Hyperlipidemia     Hypertension     Nuclear Stress 07/07/2021    EF 53%.     S/P angioplasty with stent 3/4/2010, 2/2001    3/4/2010-PTCA with 2.75 X 38 mm stent to LAD; 2/2001-PTCA with stent to LAD    S/P cardiac pacemaker procedure 06/04/2013    battery replacement    Sleep apnea     has CPAP     Past Surgical History:   Procedure Laterality Date    ATRIAL ABLATION SURGERY  8/11/15    Atrial fib/flutter ablation     CARDIAC CATHETERIZATION  06/14/2015    no significant disease    CARDIAC PACEMAKER PLACEMENT  4/2001 4/2001-[employer] PPM Integrity DDD Model 5342    CARPAL TUNNEL RELEASE Bilateral 1976    CORONARY ANGIOPLASTY WITH STENT PLACEMENT  3/4/2010, 2/2001    3/4/2010-PTCA with 2.75 X 38 mm stent to LAD; 2/2001-PTCA with stent to LAD;    DIAGNOSTIC CARDIAC CATH LAB PROCEDURE  5/2/2012,2/22/2011, 3/4/2010, 2/2001 5/2/2012-[facility]-No evid of signif CAD; Normal peripheral angiogram; 2/22/2011-No signif CAD, stent patent, had tachycardic event; 3/4/2010-Severe stenosis and re in-stent stenosis of LAD    FINGER TRIGGER RELEASE Left 1994    thumb    HAND TENDON SURGERY Left 1976    Left middle finger    HERNIA REPAIR Bilateral 1993    bilateral inguinal hernias    JOINT REPLACEMENT      knees bilaterally-Right 10/2009; 1/2008-Left    KNEE ARTHROSCOPY  11/2005, 1989 11/2005, 1989-Right knee    LUMBAR DISC ARTHROPLASTY  07/08/2016    OTHER SURGICAL HISTORY  5/2/2012 5/2/2012-PERIPHERAL ANGIOGRAM-Normal    UPPER GASTROINTESTINAL ENDOSCOPY  5/3/2001    5/3/9937-[facility])      As reviewed   Family History   Problem Relation Age of Onset    Heart Disease Mother     High Blood Pressure Mother     Stroke Father     Coronary Art Dis Father         MI    Other Brother         accidental    Brain Cancer Paternal Grandfather      Social History     Tobacco Use    Smoking status: Never Smoker    Smokeless tobacco: Never Used   Substance Use Topics    Alcohol use: No     Alcohol/week: 0.0 standard drinks        Objective:    Vitals:    01/31/22 1256   BP: 128/72   Pulse: 83   Weight: 226 lb 9.6 oz (102.8 kg)   Height: 5' 4\" (1.626 m)     /72   Pulse 83   Ht 5' 4\" (1.626 m)   Wt 226 lb 9.6 oz (102.8 kg)   BMI 38.90 kg/m²     No flowsheet data found. Wt Readings from Last 3 Encounters:   01/31/22 226 lb 9.6 oz (102.8 kg)   11/12/21 215 lb 3.2 oz (97.6 kg)   07/26/21 221 lb 9.6 oz (100.5 kg)     Body mass index is 38.9 kg/m². GENERAL - Alert, oriented, pleasant, in no apparent distress. EYES: No jaundice, no conjunctival pallor. SKIN: It is warm & dry. No rashes. No Echhymosis    HEENT - No clinically significant abnormalities seen. Neck - Supple. No jugular venous distention noted. No carotid bruits. Cardiovascular - Normal S1 and S2 without obvious murmur or gallop. Extremities - No cyanosis, clubbing, or significant edema. Pulmonary - No respiratory distress. No wheezes or rales. Abdomen - No masses, tenderness, or organomegaly. Musculoskeletal - No significant edema. No joint deformities. No muscle wasting. Neurologic - Cranial nerves II through XII are grossly intact. There were no gross focal neurologic abnormalities.     Lab Review   Lab Results   Component Value Date    CKTOTAL 297 05/01/2012    CKMB 7.2 05/01/2012    TROPONINT 0.017 06/11/2015     BNP:    Lab Results   Component Value Date    BNP 43 12/22/2011     PT/INR:    Lab Results   Component Value Date    INR 0.92 01/04/2016     No results found for: LABA1C  Lab Results   Component Value Date    WBC 8.7 11/24/2019    HCT 33.6 (L) 11/24/2019    MCV 90.1 11/24/2019     11/24/2019     Lab Results   Component Value Date    CHOL 121 03/24/2017    TRIG 72 03/24/2017    HDL 48 03/24/2017    LDLDIRECT 67 03/24/2017     Lab Results   Component Value Date    ALT 20 11/24/2019    AST 22 11/24/2019     BMP:    Lab Results   Component Value Date     11/24/2019    K 3.5 11/24/2019    CL 96 11/24/2019    CO2 27 11/24/2019    BUN 15 11/24/2019    CREATININE 1.2 11/24/2019     CMP:   Lab Results   Component Value Date     11/24/2019    K 3.5 11/24/2019    CL 96 11/24/2019    CO2 27 11/24/2019    BUN 15 11/24/2019    PROT 6.6 11/24/2019    PROT 7.2 05/01/2012     TSH:    Lab Results   Component Value Date    TSHHS 0.925 03/24/2017           Assessment & Plan:               -     CORONARY ARTERY DISEASE:  asymptomatic     All available  tests in chart reviewed. Management discussed . Testing ordered  no          On aspirin      Cardiolite was unremarkable last July                   -  Hypertension: Patients blood pressure is normal. Patient is advised about low sodium diet. Present medical regimen will not be changed. On Norvasc 5 mg p.o. daily losartan 50 mg p.o. daily Lopressor 50 mg p.o. twice daily         - PPM; on carelink           -  LIPID MANAGEMENT:  Importance of lipid levels discussed with patient   and patient was given dietary advice. NCEP- ATP III guidelines reviewed with patient. -   Ch on Crestor 20 mg p.o. daily anges  in medicines made: No                                - Atrial fibrillation, pt is  compliant with meds.  Patient does not have symptoms from atrial fibrillation  On Eliquis 5 mg p.o. daily    -Obstructive sleep apnea on CPAP compliant, echo for PAP  Has been having increasing shortness of breath hence will obtain echocardiogram for evaluation of his pulmonary artery pressure and go from there  Patient also sees Pulmonal pulmonary medicine    Mortality from the morbid obesity is very high:  Patient's BMI is 38.9 which is very high he was also advised to work on his weight     Body mass index is 38.9 kg/m².         Ceci Juan MD    [facility]

## 2022-02-18 ENCOUNTER — HOSPITAL ENCOUNTER (OUTPATIENT)
Dept: WOUND CARE | Age: 82
Discharge: HOME | End: 2022-02-18
Payer: MEDICARE

## 2022-02-18 VITALS
RESPIRATION RATE: 16 BRPM | HEART RATE: 80 BPM | DIASTOLIC BLOOD PRESSURE: 76 MMHG | SYSTOLIC BLOOD PRESSURE: 151 MMHG | TEMPERATURE: 99.1 F

## 2022-02-18 DIAGNOSIS — I87.313 CHRONIC VENOUS HYPERTENSION (IDIOPATHIC) WITH ULCER OF BILATERAL LOWER EXTREMITY (CODE) (HCC): ICD-10-CM

## 2022-02-18 DIAGNOSIS — L97.812 NON-PRESSURE CHRONIC ULCER OF OTHER PART OF RIGHT LOWER LEG WITH FAT LAYER EXPOSED (HCC): ICD-10-CM

## 2022-02-18 DIAGNOSIS — L97.821 NON-PRESSURE CHRONIC ULCER OF OTHER PART OF LEFT LOWER LEG LIMITED TO BREAKDOWN OF SKIN (HCC): ICD-10-CM

## 2022-02-18 PROCEDURE — 99213 OFFICE O/P EST LOW 20 MIN: CPT

## 2022-02-18 PROCEDURE — 97597 DBRDMT OPN WND 1ST 20 CM/<: CPT

## 2022-02-18 RX ORDER — BACITRACIN ZINC AND POLYMYXIN B SULFATE 500; 1000 [USP'U]/G; [USP'U]/G
OINTMENT TOPICAL ONCE
Status: CANCELLED | OUTPATIENT
Start: 2022-02-18 | End: 2022-02-18

## 2022-02-18 RX ORDER — CLOBETASOL PROPIONATE 0.5 MG/G
OINTMENT TOPICAL ONCE
Status: CANCELLED | OUTPATIENT
Start: 2022-02-18 | End: 2022-02-18

## 2022-02-18 RX ORDER — BACITRACIN, NEOMYCIN, POLYMYXIN B 400; 3.5; 5 [USP'U]/G; MG/G; [USP'U]/G
OINTMENT TOPICAL ONCE
Status: CANCELLED | OUTPATIENT
Start: 2022-02-18 | End: 2022-02-18

## 2022-02-18 RX ORDER — GENTAMICIN SULFATE 1 MG/G
OINTMENT TOPICAL ONCE
Status: CANCELLED | OUTPATIENT
Start: 2022-02-18 | End: 2022-02-18

## 2022-02-18 RX ORDER — LIDOCAINE HYDROCHLORIDE 40 MG/ML
SOLUTION TOPICAL ONCE
Status: CANCELLED | OUTPATIENT
Start: 2022-02-18 | End: 2022-02-18

## 2022-02-18 RX ORDER — LIDOCAINE HYDROCHLORIDE 20 MG/ML
JELLY TOPICAL ONCE
Status: CANCELLED | OUTPATIENT
Start: 2022-02-18 | End: 2022-02-18

## 2022-02-18 RX ORDER — LIDOCAINE 40 MG/G
CREAM TOPICAL ONCE
Status: CANCELLED | OUTPATIENT
Start: 2022-02-18 | End: 2022-02-18

## 2022-02-18 RX ORDER — GINSENG 100 MG
CAPSULE ORAL ONCE
Status: CANCELLED | OUTPATIENT
Start: 2022-02-18 | End: 2022-02-18

## 2022-02-18 RX ORDER — BETAMETHASONE DIPROPIONATE 0.05 %
OINTMENT (GRAM) TOPICAL ONCE
Status: CANCELLED | OUTPATIENT
Start: 2022-02-18 | End: 2022-02-18

## 2022-02-18 RX ORDER — LIDOCAINE 50 MG/G
OINTMENT TOPICAL ONCE
Status: CANCELLED | OUTPATIENT
Start: 2022-02-18 | End: 2022-02-18

## 2022-02-18 ASSESSMENT — PAIN DESCRIPTION - ORIENTATION: ORIENTATION: RIGHT;LEFT

## 2022-02-18 ASSESSMENT — PAIN DESCRIPTION - PROGRESSION: CLINICAL_PROGRESSION: NOT CHANGED

## 2022-02-18 ASSESSMENT — PAIN - FUNCTIONAL ASSESSMENT: PAIN_FUNCTIONAL_ASSESSMENT: PREVENTS OR INTERFERES SOME ACTIVE ACTIVITIES AND ADLS

## 2022-02-18 ASSESSMENT — PAIN DESCRIPTION - PAIN TYPE: TYPE: ACUTE PAIN;CHRONIC PAIN

## 2022-02-18 ASSESSMENT — PAIN SCALES - GENERAL: PAINLEVEL_OUTOF10: 5

## 2022-02-18 ASSESSMENT — PAIN DESCRIPTION - FREQUENCY: FREQUENCY: INTERMITTENT

## 2022-02-18 ASSESSMENT — PAIN DESCRIPTION - LOCATION: LOCATION: LEG

## 2022-02-18 ASSESSMENT — PAIN DESCRIPTION - ONSET: ONSET: ON-GOING

## 2022-02-18 ASSESSMENT — PAIN DESCRIPTION - DESCRIPTORS: DESCRIPTORS: BURNING

## 2022-02-18 NOTE — H&P
215 [facility] Initial Visit      Gallo Jimenez  AGE: 79 y.o. GENDER: male  : 1940  EPISODE DATE:  2022   Referred by: Cam Morocho    Subjective:     CHIEF COMPLAINT : recurrent ulcers on legs and blisters with drainage     HISTORY of PRESENT ILLNESS      Gallo Jimenez is a 79 y.o. male who presents to the [address] [place] [address] for an initial visit for evaluation and treatment of Chronic venous  ulcer(s) of  Bilateral legs. The condition is of mild severity. The ulcer has been present on and off for many months. He gets increased swelling, redness of the legs, blisters, then open wounds, resolved with antibiotics and is fine for a few week, then the cycle repeats. He does have significant heart trouble and is chronically short of breath with minimal activity. He does sleep in a bed with legs up, but does admit to either sitting or standing frequently. He is unable to put on compression stockings. The underlying cause is thought to be a combination of chronic leg edema from CHF and venous stasis. The patients care to date has included clindamycin, which was just started a few days ago. He has continued to have wounds with copious drainage despite this though. . The patient has significant underlying medical conditions as below.      Wound Pain Timing/Severity: mild  Quality of pain: tender  Severity of pain:  2 / 10   Modifying Factors: edema, venous stasis, decreased mobility and obesity  Associated Signs/Symptoms: edema, erythema and drainage        PAST MEDICAL HISTORY        Diagnosis Date    Aortic regurgitation 2012    4/3/2012-mild; 2011-mild to moderate    Arrhythmia 2011-tachycardia episode during cardiac cath-48 HR Holter monitor normal    Blisters of multiple sites 10/05/2016    CAD (coronary artery disease)     sees Dr Clifford Schmitz, prior Dr Bonnie Bonilla;   Arsenio Arce Cardiac pacemaker 2001-[employer] PPM Integrity DDD Model 5342    Congestive heart failure [facility])     Family history of coronary artery disease     Father-MI    GERD (gastroesophageal reflux disease)     H/O cardiac catheterization 5/2/2012, 2/22/2011, 3/4/2010, 2/2001 5/2/2012-[facility]-No evid of signif CAD; Normal peripheral angiogram; 2/22/2011-No signif CAD, stent patent, had tachycardic event; 3/4/2010-Severe stenosis and re in-stent stenosis of LAD    H/O cardiovascular stress test 4/16/2012, 2/7/2011 4/16/2012-Normal Lexiscan study. No evid of ischemia. Attenuation artifact in inferior region of myocardium. EF 58%.  H/O cardiovascular stress test 03/04/2015    normal, no ischemia, EF62%    H/O cardiovascular stress test 12/06/2017    EF60% normal study    H/O Doppler ultrasound 02/07/2011 2/7/2011-CAROTID US-Normal    H/O Doppler ultrasound 05/24/2013    periphal normal    H/O Doppler ultrasound 06/18/2014 6/14 Normal    H/O Doppler ultrasound 11/13/15 09/03/15    11/15 WNL 9/15Arterial and Venous Doppler is normal.     H/O echocardiogram 03/04/2015    EF60% Normal LV and systolic function. Trace MR, Aortic, and TR. No other valvulopathy.      H/O echocardiogram 08/08/2016    EF60% mild AR, mild phtn    H/O echocardiogram 11/01/2017    EF55-60% sclerotic, non stenotic AV, mild AR, phtn    H/O peptic ulcer 1970s    Hiatal hernia     History of cardiac monitoring 06/01/2015    Event - Patient noted to have recurrence of atrial fibrillation with RVR on medical therapy on event monitor    History of Doppler carotid echocardiogram 01/15/2020    No hemodynamically significant stenosis in the internal carotid artery bilaterally, Right internal carotid artery exhibits mild homogenous plaque, Left internal exhibits minimal homogenous plaque, totuosity of the bilateral internal carotid arteries    History of Holter monitoring 05/19/2015    24 hour - predominant rhythm was sinus intermittent paced, paced rhythm can not be ruled out d/t pacemaker function being turned off during device setup, limited pacemaker data available    History of nuclear stress test 11/10/2016    lexiscan-normal,EF70%    HX OTHER MEDICAL 05/02/2012 5/2/2012-PERIPHERAL ANGIOGRAM-Normal-Dr Adi Aguilera OTHER MEDICAL 03/02/2011    3/2/2011-48 HR HOLTER MONITOR-Pred Rhythm is sinus rhythm. No arrhythmias.  Hyperlipidemia     Hypertension     Nuclear Stress 07/07/2021    EF 53%.     S/P angioplasty with stent 3/4/2010, 2/2001    3/4/2010-PTCA with 2.75 X 38 mm stent to LAD; 2/2001-PTCA with stent to LAD    S/P cardiac pacemaker procedure 06/04/2013    battery replacement    Sleep apnea     has CPAP    WD-Chronic venous hypertension (idiopathic) with ulcer of bilateral lower extremity (CODE) ([address] 2/18/2022    WD-Non-pressure chronic ulcer of other part of left lower leg limited to breakdown of skin ([address] 2/18/2022    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 ([address] 2/18/2022       PAST SURGICAL HISTORY    Past Surgical History:   Procedure Laterality Date    ATRIAL ABLATION SURGERY  8/11/15    Atrial fib/flutter ablation     CARDIAC CATHETERIZATION  06/14/2015    no significant disease    CARDIAC PACEMAKER PLACEMENT  4/2001 4/2001-[employer] PPM Integrity DDD Model 5342    CARPAL TUNNEL RELEASE Bilateral 1976    CORONARY ANGIOPLASTY WITH STENT PLACEMENT  3/4/2010, 2/2001    3/4/2010-PTCA with 2.75 X 38 mm stent to LAD; 2/2001-PTCA with stent to LAD;    DIAGNOSTIC CARDIAC CATH LAB PROCEDURE  5/2/2012,2/22/2011, 3/4/2010, 2/2001 5/2/2012-[facility]-No evid of signif CAD; Normal peripheral angiogram; 2/22/2011-No signif CAD, stent patent, had tachycardic event; 3/4/2010-Severe stenosis and re in-stent stenosis of LAD    FINGER TRIGGER RELEASE Left 1994    thumb    HAND TENDON SURGERY Left 1976    Left middle finger    HERNIA REPAIR Bilateral 1993    bilateral inguinal hernias    JOINT REPLACEMENT      knees bilaterally-Right 10/2009; 1/2008-Left    KNEE ARTHROSCOPY 11/2005, 1989 11/2005, 1989-Right knee    LUMBAR DISC ARTHROPLASTY  07/08/2016    OTHER SURGICAL HISTORY  5/2/2012 5/2/2012-PERIPHERAL ANGIOGRAM-Normal    UPPER GASTROINTESTINAL ENDOSCOPY  5/3/2001    5/3/6796-[facility])       FAMILY HISTORY    Family History   Problem Relation Age of Onset    Heart Disease Mother     High Blood Pressure Mother     Stroke Father     Coronary Art Dis Father         MI    Other Brother         accidental    Brain Cancer Paternal Grandfather        SOCIAL HISTORY    Social History     Tobacco Use    Smoking status: Never Smoker    Smokeless tobacco: Never Used   Vaping Use    Vaping Use: Never used   Substance Use Topics    Alcohol use: No     Alcohol/week: 0.0 standard drinks    Drug use: No       ALLERGIES    Allergies   Allergen Reactions    Ceclor [Cefaclor] Rash    Celebrex [Celecoxib] Rash    Pcn [Penicillins] Rash    Relafen [Nabumetone] Rash       MEDICATIONS    Current Outpatient Medications on File Prior to Encounter   Medication Sig Dispense Refill    amLODIPine (NORVASC) 5 MG tablet       TRAZODONE HCL PO Take by mouth      DULoxetine (CYMBALTA) 30 MG extended release capsule Take 30 mg by mouth daily      ferrous sulfate (IRON 325) 325 (65 Fe) MG tablet Take 325 mg by mouth daily      potassium chloride (KLOR-CON M) 20 MEQ extended release tablet Take 1 tablet by mouth daily      diclofenac-miSOPROStol (ARTHROTEC) 75-0.2 MG per tablet Take 1 tablet by mouth 2 times daily      tiZANidine (ZANAFLEX) 4 MG tablet TAKE 1/2 TABLET BY MOUTH THREE TIMES A DAY FOR 2 DAYS, THEN TAKE 1 TABLET BY MOUTH THREE TIMES A DAY AFTER  2    losartan (COZAAR) 50 MG tablet Take 50 mg by mouth daily       dexlansoprazole (DEXILANT) 60 MG CPDR delayed release capsule Take 60 mg by mouth daily      furosemide (LASIX) 40 MG tablet Take 1 tablet by mouth 2 times daily 180 tablet 3    dronedarone hcl (MULTAQ) 400 MG TABS Take 400 mg by mouth 2 times daily (with meals)      nitroGLYCERIN (NITROLINGUAL) 0.4 MG/SPRAY 0.4 mg spray Place 1 spray under the tongue every 5 minutes as needed for Chest pain      CPAP Machine MISC by Does not apply route nightly      apixaban (ELIQUIS) 5 MG TABS tablet Take 1 tablet by mouth 2 times daily 60 tablet 3    rosuvastatin (CRESTOR) 20 MG tablet Take 20 mg by mouth daily.  pramipexole (MIRAPEX) 1 MG tablet   Take 0.5 mg by mouth 2 times daily Patient takes 1/2 tablet in the am and 1 1/2 tablet at night.  metoprolol (LOPRESSOR) 50 MG tablet Take 50 mg by mouth 2 times daily.  modafinil (PROVIGIL) 100 MG tablet Take 100 mg by mouth daily.  esomeprazole (NEXIUM) 40 MG capsule Take 40 mg by mouth 2 times daily.  aspirin 81 MG chewable tablet Take 81 mg by mouth daily.  zolpidem (AMBIEN) 10 MG tablet Take by mouth nightly as needed for Sleep       No current facility-administered medications on file prior to encounter.        PROBLEM LIST    Patient Active Problem List   Diagnosis    History of PTCA    Hyperlipidemia    Hypertension    Sleep apnea    [facility]    Pacer at end of battery life    SSS (sick sinus syndrome) ([facility])    Swelling of calf    CAD (coronary artery disease)    PAF (paroxysmal atrial fibrillation) ([facility])    HTN (hypertension)    Tachycardia    Congestive heart failure ([facility])    Pacemaker    Atrial fibrillation ([facility])    History of cardiac monitoring    S/P ablation of atrial fibrillation    S/P ablation of atrial flutter    Hematuria    Abdominal discomfort    Blisters of multiple sites    Syncope and collapse    JAIMIE treated with BiPAP    Obesity (BMI 35.0-39.9 without comorbidity)    Excessive daytime sleepiness    SOB (shortness of breath) on exertion    Pulmonary hypertension (HCC)    Venous stasis    WD-Chronic venous hypertension (idiopathic) with ulcer of bilateral lower extremity (CODE) ([facility])    WD-Non-pressure chronic ulcer of other part of left lower leg limited to breakdown of skin ([address]    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 ([address]       REVIEW OF SYSTEMS    Pertinent items are noted in HPI. Objective:      BP (!) 151/76   Pulse 80   Temp 99.1 °F (37.3 °C) (Temporal)   Resp 16     PHYSICAL EXAM  GEN: Awake, oriented, appears his stated age. No distress. CV: S1, S2, I don't hear murmur  RESP: does not appear short of breath, breath sounds are clear anteriorly  EXT: 1 plus pitting edema on both legs  Dermatologic exam: Visual inspection of the periwound reveals the skin to be sclerotic, atrophic and edematous  Wound exam: see wound description below in procedure note      Assessment:       Marek Krishnan  appears to have a non-healing wound of the bilateral legs. The etiology of the wound is felt to be venous. There are multiple complicating factors including edema and venous stasis and underlying heart disease. A comprehensive wound management program would be helpful to heal this wound. Assessments completed include fall risk and nutritional, functional,and psychological status. At this time appropriate care would include: periodic debridement and wound care as below. He also needs compression- will start with light stockings this week and graduate to unna boots if tolerated. In addition, he needs compression stockings to wear long-term once his wounds are healed. He would be good candidate for juxta or farrow wraps.      Problem List Items Addressed This Visit     WD-Chronic venous hypertension (idiopathic) with ulcer of bilateral lower extremity (CODE) (HCC)    WD-Non-pressure chronic ulcer of other part of left lower leg limited to breakdown of skin ([address]    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 ([address]            Procedure Note    Indications:  Based on my examination of this patient's wound(s) today, sharp excision into necrotic epidermis is required to promote healing and evaluate the extent of previous healing. Performed by: Shelly Vick MD    Consent obtained: Yes    Time out taken:  Yes    Pain Control: topical lidocaine       Debridement:Non-excisional Debridement    Using #15 blade scalpel and forceps the wound(s) was/were sharply debrided down through and including the removal of epidermis and dermis.         Devitalized Tissue Debrided:  fibrin, biofilm, slough and exudate    Pre Debridement Measurements:  Are located in the Wound Documentation Flow Sheet    All active wounds listed below with today's date are evaluated  Wound(s)    debrided this date include # : 1, 2, 3 and 4     Post  Debridement Measurements:      Wound 02/18/22 Pretibial Right #1 (Active)   Wound Image   02/18/22 1551   Wound Cleansed Wound cleanser 02/18/22 1551   Wound Length (cm) 2.3 cm 02/18/22 1551   Wound Width (cm) 0.5 cm 02/18/22 1551   Wound Depth (cm) 0.1 cm 02/18/22 1551   Wound Surface Area (cm^2) 1.15 cm^2 02/18/22 1551   Wound Volume (cm^3) 0.115 cm^3 02/18/22 1551   Post-Procedure Length (cm) 2.3 cm 02/18/22 1626   Post-Procedure Width (cm) 0.5 cm 02/18/22 1626   Post-Procedure Depth (cm) 0.1 cm 02/18/22 1626   Post-Procedure Surface Area (cm^2) 1.15 cm^2 02/18/22 1626   Post-Procedure Volume (cm^3) 0.115 cm^3 02/18/22 1626   Distance Tunneling (cm) 0 cm 02/18/22 1551   Tunneling Position ___ O'Clock 0 02/18/22 1551   Undermining Starts ___ O'Clock 0 02/18/22 1551   Undermining Ends___ O'Clock 0 02/18/22 1551   Undermining Maxium Distance (cm) 0 02/18/22 1551   Wound Assessment Pink/red 02/18/22 1551   Drainage Amount Large 02/18/22 1551   Drainage Description Clear 02/18/22 1551   Odor None 02/18/22 1551   Kiki-wound Assessment Intact 02/18/22 1551   Margins Defined edges 02/18/22 1551   Wound Thickness Description not for Pressure Injury Full thickness 02/18/22 1551   Number of days: 0       Wound 02/18/22 Tibial Right;Posterior #2 (Active)   Wound Image   02/18/22 1551   Wound Cleansed Wound cleanser 02/18/22 1551   Wound Length (cm) 1.5 cm 02/18/22 1551   Wound Width (cm) 2 cm 02/18/22 1551   Wound Depth (cm) 0.1 cm 02/18/22 1551   Wound Surface Area (cm^2) 3 cm^2 02/18/22 1551   Wound Volume (cm^3) 0.3 cm^3 02/18/22 1551   Post-Procedure Length (cm) 1.5 cm 02/18/22 1626   Post-Procedure Width (cm) 2 cm 02/18/22 1626   Post-Procedure Depth (cm) 0.1 cm 02/18/22 1626   Post-Procedure Surface Area (cm^2) 3 cm^2 02/18/22 1626   Post-Procedure Volume (cm^3) 0.3 cm^3 02/18/22 1626   Distance Tunneling (cm) 0 cm 02/18/22 1551   Tunneling Position ___ O'Clock 0 02/18/22 1551   Undermining Starts ___ O'Clock 0 02/18/22 1551   Undermining Ends___ O'Clock 0 02/18/22 1551   Undermining Maxium Distance (cm) 0 02/18/22 1551   Wound Assessment Pink/red 02/18/22 1551   Drainage Amount Moderate 02/18/22 1551   Drainage Description Clear 02/18/22 1551   Odor None 02/18/22 1551   Kiki-wound Assessment Intact 02/18/22 1551   Margins Defined edges 02/18/22 1551   Number of days: 0       Wound 02/18/22 Pretibial Left;Lateral #3 (Active)   Wound Image   02/18/22 1551   Wound Cleansed Soap and water 02/18/22 1551   Wound Length (cm) 1.5 cm 02/18/22 1551   Wound Width (cm) 3 cm 02/18/22 1551   Wound Depth (cm) 0.1 cm 02/18/22 1551   Wound Surface Area (cm^2) 4.5 cm^2 02/18/22 1551   Wound Volume (cm^3) 0.45 cm^3 02/18/22 1551   Post-Procedure Length (cm) 1.5 cm 02/18/22 1626   Post-Procedure Width (cm) 3 cm 02/18/22 1626   Post-Procedure Depth (cm) 0.1 cm 02/18/22 1626   Post-Procedure Surface Area (cm^2) 4.5 cm^2 02/18/22 1626   Post-Procedure Volume (cm^3) 0.45 cm^3 02/18/22 1626   Distance Tunneling (cm) 0 cm 02/18/22 1551   Tunneling Position ___ O'Clock 0 02/18/22 1551   Undermining Starts ___ O'Clock 0 02/18/22 1551   Undermining Ends___ O'Clock 0 02/18/22 1551   Undermining Maxium Distance (cm) 0 02/18/22 1551   Wound Assessment Pink/red 02/18/22 1551 Drainage Amount Copious 02/18/22 1551   Drainage Description Clear 02/18/22 1551   Odor None 02/18/22 1551   Kiki-wound Assessment Intact 02/18/22 1551   Margins Defined edges 02/18/22 1551   Number of days: 0       Wound 02/18/22 Pretibial Left;Medial #4 (Active)   Wound Image   02/18/22 1551   Wound Cleansed Wound cleanser 02/18/22 1551   Wound Length (cm) 4.5 cm 02/18/22 1551   Wound Width (cm) 4 cm 02/18/22 1551   Wound Depth (cm) 1 cm 02/18/22 1551   Wound Surface Area (cm^2) 18 cm^2 02/18/22 1551   Wound Volume (cm^3) 18 cm^3 02/18/22 1551   Distance Tunneling (cm) 0 cm 02/18/22 1551   Tunneling Position ___ O'Clock 0 02/18/22 1551   Undermining Starts ___ O'Clock 0 02/18/22 1551   Undermining Ends___ O'Clock 0 02/18/22 1551   Undermining Maxium Distance (cm) 0 02/18/22 1551   Wound Assessment Pink/red 02/18/22 1551   Drainage Amount Large 02/18/22 1551   Drainage Description Clear 02/18/22 1551   Odor None 02/18/22 1551   Kiki-wound Assessment Intact 02/18/22 1551   Margins Defined edges 02/18/22 1551   Number of days: 0       Percent of Wound(s) Debrided: 50%    Total  Area  Debrided:  15 sq cm     Bleeding:  Minimal    Hemostasis Achieved:  by pressure    Procedural Pain:  5  / 10     Post Procedural Pain:  1 / 10     Response to treatment:  With complaints of pain. Plan:     Discharge Instructions       PHYSICIAN ORDERS AND DISCHARGE INSTRUCTIONS    NOTE: Upon discharge from the [address], you will receive a patient experience survey. We would be grateful if you would take the time to fill this survey out. Wound care order history:     HORTENSIA's   Right  1.67     Left 1.25   Date: 2/18/2022   Cultures:     Grafts:     Antibiotics:        Continuing wound care orders and information:                Residence:  Private               Continue home health care with: None at this time    Your wound-care supplies will be provided by: Wound cleansing:     Do not scrub or use excessive force.    Wash hands with soap and water before and after dressing changes. Prior to applying a clean dressing, cleanse wound with normal saline, wound cleanser, or mild soap and water. Ask the physician or nurse before getting the wound(s) wet in a shower    Daily Wound management:   Keep weight off wounds and reposition every 2 hours. Avoid standing for long periods of time. Apply wraps/stockings in AM and remove at bedtime. If swelling is present, elevate legs to the level of the heart or above for 30 minutes 4-5 times a day and/or when sitting. When taking antibiotics take entire prescription as ordered by physician do not stop taking until medicine is all gone. Orders for this week: 2/18/22       Rx:    Bilateral Lower Extremities - Wash with soap and water, pat dry. Cover with calcium alginate, and sofsorb. Wrap with conform and tape. Change daily  Tubi F     Discussed Juxta lite for next visit   Follow up with Dr Jocelynn Ramos in 1 week in the wound care center. Call [phone] for any questions or concerns.   Date__________   Time____________          Treatment Note Wound 02/18/22 Pretibial Right #1-Dressing/Treatment:  (ca + alginate sofsorb confom tape tubi f)  Wound 02/18/22 Tibial Right;Posterior #2-Dressing/Treatment:  (ca + alginate sofsorb confom tape tubi f)  Wound 02/18/22 Pretibial Left;Lateral #3-Dressing/Treatment:  (ca + alginate sofsorb confom tape tubi f)  Wound 02/18/22 Pretibial Left;Medial #4-Dressing/Treatment:  (ca + alginate sofsorb confom tape tubi f)    Written Patient Dismissal Instructions Given          Electronically signed by Jake Huertas MD on 2/18/2022 at 5:01 PM

## 2022-02-21 ENCOUNTER — PROCEDURE VISIT (OUTPATIENT)
Dept: CARDIOLOGY CLINIC | Age: 82
End: 2022-02-21
Payer: MEDICARE

## 2022-02-21 DIAGNOSIS — R06.02 SOB (SHORTNESS OF BREATH) ON EXERTION: ICD-10-CM

## 2022-02-21 DIAGNOSIS — R07.2 PRECORDIAL CHEST PAIN: ICD-10-CM

## 2022-02-21 LAB
LV EF: 58 %
LVEF MODALITY: NORMAL

## 2022-02-21 PROCEDURE — 93306 TTE W/DOPPLER COMPLETE: CPT | Performed by: INTERNAL MEDICINE

## 2022-02-22 ENCOUNTER — TELEPHONE (OUTPATIENT)
Dept: CARDIOLOGY CLINIC | Age: 82
End: 2022-02-22

## 2022-02-22 NOTE — TELEPHONE ENCOUNTER
Left ventricular function is normal, EF 55-60%. Mild concentric left ventricular hypertrophy. Mildly dilated left atrium. Sclerotic, but non-stenotic aortic valve. Moderate aortic insufficiency with PHT of 446 msec. Mitral annular calcification is present. Mildly elevated pulmonary artery pressure, RVSP 36 mmHg. No evidence of pericardial effusion. Pacemaker wire visualized in the right ventricle. LM of Normal Results, OK per patient communication form.

## 2022-02-25 ENCOUNTER — HOSPITAL ENCOUNTER (OUTPATIENT)
Dept: WOUND CARE | Age: 82
Discharge: HOME | End: 2022-02-25
Payer: MEDICARE

## 2022-02-25 VITALS
SYSTOLIC BLOOD PRESSURE: 143 MMHG | RESPIRATION RATE: 18 BRPM | DIASTOLIC BLOOD PRESSURE: 72 MMHG | TEMPERATURE: 97.8 F | HEART RATE: 92 BPM

## 2022-02-25 DIAGNOSIS — I87.313 CHRONIC VENOUS HYPERTENSION (IDIOPATHIC) WITH ULCER OF BILATERAL LOWER EXTREMITY (CODE) (HCC): Primary | ICD-10-CM

## 2022-02-25 PROCEDURE — 97597 DBRDMT OPN WND 1ST 20 CM/<: CPT

## 2022-02-25 PROCEDURE — 11042 DBRDMT SUBQ TIS 1ST 20SQCM/<: CPT

## 2022-02-25 RX ORDER — BETAMETHASONE DIPROPIONATE 0.05 %
OINTMENT (GRAM) TOPICAL ONCE
Status: CANCELLED | OUTPATIENT
Start: 2022-02-25 | End: 2022-02-25

## 2022-02-25 RX ORDER — LIDOCAINE HYDROCHLORIDE 40 MG/ML
SOLUTION TOPICAL ONCE
Status: CANCELLED | OUTPATIENT
Start: 2022-02-25 | End: 2022-02-25

## 2022-02-25 RX ORDER — BACITRACIN ZINC AND POLYMYXIN B SULFATE 500; 1000 [USP'U]/G; [USP'U]/G
OINTMENT TOPICAL ONCE
Status: CANCELLED | OUTPATIENT
Start: 2022-02-25 | End: 2022-02-25

## 2022-02-25 RX ORDER — LIDOCAINE HYDROCHLORIDE 20 MG/ML
JELLY TOPICAL ONCE
Status: CANCELLED | OUTPATIENT
Start: 2022-02-25 | End: 2022-02-25

## 2022-02-25 RX ORDER — BACITRACIN, NEOMYCIN, POLYMYXIN B 400; 3.5; 5 [USP'U]/G; MG/G; [USP'U]/G
OINTMENT TOPICAL ONCE
Status: CANCELLED | OUTPATIENT
Start: 2022-02-25 | End: 2022-02-25

## 2022-02-25 RX ORDER — GENTAMICIN SULFATE 1 MG/G
OINTMENT TOPICAL ONCE
Status: CANCELLED | OUTPATIENT
Start: 2022-02-25 | End: 2022-02-25

## 2022-02-25 RX ORDER — LIDOCAINE 40 MG/G
CREAM TOPICAL ONCE
Status: CANCELLED | OUTPATIENT
Start: 2022-02-25 | End: 2022-02-25

## 2022-02-25 RX ORDER — LIDOCAINE 50 MG/G
OINTMENT TOPICAL ONCE
Status: CANCELLED | OUTPATIENT
Start: 2022-02-25 | End: 2022-02-25

## 2022-02-25 RX ORDER — CLOBETASOL PROPIONATE 0.5 MG/G
OINTMENT TOPICAL ONCE
Status: CANCELLED | OUTPATIENT
Start: 2022-02-25 | End: 2022-02-25

## 2022-02-25 RX ORDER — GINSENG 100 MG
CAPSULE ORAL ONCE
Status: CANCELLED | OUTPATIENT
Start: 2022-02-25 | End: 2022-02-25

## 2022-02-25 NOTE — PROGRESS NOTES
Compression [address] for Compression Stockings:     [employer] [id number] [facility], [address] [place] [address] p: [phone] f: [phone]     Ordering Center:     Johnathon Adler [address] [place] [address] Shai Burns [phone]  WOUND CARE Dept: [phone]     [facility] [phone]    Patient Information:      Venancio STEARNS[address]  [facility] [address]   [phone]   : 1940  AGE: 79 y.o. GENDER: male   TODAYS DATE:  2022    Insurance:      PRIMARY INSURANCE:  Plan: [facility] [employer]  Coverage: [facility] MEDICARE  Effective Date: 2015  Group Number: [unfilled]  Subscriber Number: [id number] - (Medicare Managed)    Payor/Plan Subscr  Sex Relation Sub. Ins. ID Effective Group Num   1. 2850 [facility] [address] 1940 Male Self [id number] 1/1/15 [id number]                                   [address] [address]       Patient Information:      Problem List Items Addressed This Visit     WD-Chronic venous hypertension (idiopathic) with ulcer of bilateral lower extremity (CODE) ([facility] [address] - Primary    Relevant Orders    Initiate Outpatient Wound Care Protocol          Wound 10/05/16 Venous ulcer WOUND #1 RT PROXIMAL LEG(ONSET 2 MTHS) (Active)   Number of days:        Wound 10/05/16 Venous ulcer WOUND #2 RT LATERAL LEG CLUSTER (ONSET 2MTHS) (Active)   Number of days:        Wound 10/05/16 Venous ulcer WOUND #3RT DISTAL LEG(ONSET 2 MTHS) (Active)   Number of days:        Wound 10/05/16 Venous ulcer WOUND 4 RT MEDIAL LEG (ONSET 2 MTHS) (Active)   Number of days:        Wound 22 #1 Right anterior lower leg (Active)   Wound Image   22 1551   Dressing Status Clean;Dry; Intact; New dressing applied 22 1612   Wound Cleansed Wound cleanser; Soap and water 22 1530   Dressing/Treatment Alginate;Coban/self-adherent bandages 22 1612   Offloading for Diabetic Foot Ulcers Offloading not required 22 1530   Wound Length 1606   Post-Procedure Surface Area (cm^2) 19.5 cm^2 02/25/22 1606   Post-Procedure Volume (cm^3) 1.95 cm^3 02/25/22 1606   Distance Tunneling (cm) 0 cm 02/25/22 1530   Tunneling Position ___ O'Clock 0 02/25/22 1530   Undermining Starts ___ O'Clock 0 02/25/22 1530   Undermining Ends___ O'Clock 0 02/25/22 1530   Undermining Maxium Distance (cm) 0 02/25/22 1530   Wound Assessment Pink/red 02/25/22 1530   Drainage Amount Moderate 02/25/22 1530   Drainage Description Clear 02/25/22 1530   Odor None 02/25/22 1530   Kiki-wound Assessment Intact 02/25/22 1530   Margins Defined edges 02/25/22 1530   Number of days: 7       Wound 02/18/22 #3 Left lateral lower leg cluster (Active)   Wound Image   02/18/22 1551   Dressing Status Clean;Dry; Intact; New dressing applied 02/25/22 1612   Wound Cleansed Soap and water; Wound cleanser 02/25/22 1530   Dressing/Treatment Alginate;Coban/self-adherent bandages 02/25/22 1612   Offloading for Diabetic Foot Ulcers Offloading not required 02/25/22 1530   Wound Length (cm) 1.5 cm 02/25/22 1530   Wound Width (cm) 1 cm 02/25/22 1530   Wound Depth (cm) 0.1 cm 02/25/22 1530   Wound Surface Area (cm^2) 1.5 cm^2 02/25/22 1530   Change in Wound Size % (l*w) 66.67 02/25/22 1530   Wound Volume (cm^3) 0.15 cm^3 02/25/22 1530   Wound Healing % 67 02/25/22 1530   Post-Procedure Length (cm) 1.5 cm 02/18/22 1626   Post-Procedure Width (cm) 3 cm 02/18/22 1626   Post-Procedure Depth (cm) 0.1 cm 02/18/22 1626   Post-Procedure Surface Area (cm^2) 4.5 cm^2 02/18/22 1626   Post-Procedure Volume (cm^3) 0.45 cm^3 02/18/22 1626   Distance Tunneling (cm) 0 cm 02/25/22 1530   Tunneling Position ___ O'Clock 0 02/25/22 1530   Undermining Starts ___ O'Clock 0 02/25/22 1530   Undermining Ends___ O'Clock 0 02/25/22 1530   Undermining Maxium Distance (cm) 0 02/25/22 1530   Wound Assessment Pink/red 02/25/22 1530   Drainage Amount Copious 02/25/22 1530   Drainage Description Clear 02/25/22 1530   Odor None 02/25/22 1530 Kiki-wound Assessment Intact 02/25/22 1530   Margins Defined edges 02/25/22 1530   Wound Thickness Description not for Pressure Injury Full thickness 02/25/22 1530   Number of days: 7       Right Leg Measurements: (ALL measurements are in cm)  Right Leg Edema Point of Measurement  Great toe to forefoot: 13 cm  Heel to ankle: 9 cm  Heel to calf: 31  Leg circumference: 39.5 cm (42 cm from posterior knee to heel base)  Ankle circumference: 25 cm  Foot circumference: 24.5 cm  Compression Therapy: 2 layer compression wrap       Left Leg Measurements: (ALL measurements are in cm)  Left Leg Edema Point of Measurement  Great toe to forefoot: 13 cm  Heel to ankle: 9 cm  Heel to calf: 31  Leg circumference: 39.8 cm (39 cm from posterior knee to heel base)  Ankle circumference: 22.5 cm  Foot circumference: 25 cm  Compression Therapy: 2 layer compression wrap    Supplies Requested :     Medicare Requirements  Patient must have a qualifying Active Venus Ulcer if ordering Bilateral Compression Wounds MUST be present on both legs for Medicare Coverage. The patient can Not be on home health or have had a Medicare part A stay in the past 24 hours.     Patient Wound(s) Debrided: [x] Yes if yes please add date 2/25/2022  [] No    Debribement Type: Excisional/Sharp    Patient currently being seen by Home Health: [] Yes   [x] No     Compression Type: Circaid Juxtalite, Original, 30-40 mm/Hg, BILATERAL lower legs     Provider Information:      PROVIDER'S NAME: Dr Tami Parra    NPI: [id number]

## 2022-02-25 NOTE — PROGRESS NOTES
Multilayer Compression Wrap   (Not Unna) Below the Knee    NAME:  Bella Query  YOB: 1940  MEDICAL RECORD NUMBER:  [id number]  DATE:  2/25/2022    Multilayer compression wrap: Removed old Multilayer wrap if indicated and wash leg with mild soap/water. Applied moisturizing agent to dry skin as needed. Applied primary and secondary dressing as ordered. Applied multilayered dressing below the knee to right lower leg. Applied multilayered dressing below the knee to left lower leg. Instructed patient/caregiver not to remove dressing and to keep it clean and dry. Instructed patient/caregiver on complications to report to provider, such as pain, numbness in toes, heavy drainage, and slippage of dressing. Instructed patient on purpose of compression dressing and on activity and exercise recommendations. Patient tolerated treatment well.       Electronically signed by Gregorio Reyes RN on 2/25/2022 at 4:18 PM

## 2022-02-25 NOTE — PROGRESS NOTES
Wound Care Center Progress Note With Procedure    Smita Martinez  AGE: 79 y.o. GENDER: male  : 1940  EPISODE DATE:  2022     Subjective:     Chief Complaint   Patient presents with    Wound Check     BLE         HISTORY of PRESENT ILLNESS      Smita Martinez is a 79 y.o. male who presents today for wound evaluation of Chronic venous ulcer(s) of the bilateral legs. The ulcer is of mild severity. The underlying cause of the wound is venous stasis. He is much better this week. He has far less open areas. He will benefit from increased compression and is agreeable to compression wraps this week. He will need to have compression socks- juxta lites. Wound Pain Timing/Severity: mild  Quality of pain: N/A  Severity of pain:  1 / 10   Modifying Factors: edema and venous stasis  Associated Signs/Symptoms: none        PAST MEDICAL HISTORY        Diagnosis Date    Aortic regurgitation 2012    4/3/2012-mild; 2011-mild to moderate    Arrhythmia 2011-tachycardia episode during cardiac cath-48 HR Holter monitor normal    Blisters of multiple sites 10/05/2016    CAD (coronary artery disease)     sees Dr Nando Masters, prior Dr Tori Valdovinos;   Sorto Cardiac pacemaker 2001-[employer] PPM Integrity DDD Model 5342    Congestive heart failure ([address]     Family history of coronary artery disease     Father-MI    GERD (gastroesophageal reflux disease)     H/O cardiac catheterization 2012, 2011, 3/4/2010, 2012-[facility]-No evid of signif CAD; Normal peripheral angiogram; 2011-No signif CAD, stent patent, had tachycardic event; 3/4/2010-Severe stenosis and re in-stent stenosis of LAD    H/O cardiovascular stress test 2012, 2011-Normal Lexiscan study. No evid of ischemia. Attenuation artifact in inferior region of myocardium. EF 58%.      H/O cardiovascular stress test 2015    normal, no ischemia, EF62%    H/O cardiovascular stress test 12/06/2017    EF60% normal study    H/O Doppler ultrasound 02/07/2011 2/7/2011-CAROTID US-Normal    H/O Doppler ultrasound 05/24/2013    periphal normal    H/O Doppler ultrasound 06/18/2014 6/14 Normal    H/O Doppler ultrasound 11/13/15 09/03/15    11/15 WNL 9/15Arterial and Venous Doppler is normal.     H/O echocardiogram 03/04/2015    EF60% Normal LV and systolic function. Trace MR, Aortic, and TR. No other valvulopathy.  H/O echocardiogram 08/08/2016    EF60% mild AR, mild phtn    H/O echocardiogram 11/01/2017    EF55-60% sclerotic, non stenotic AV, mild AR, phtn    H/O peptic ulcer 1970s    Hiatal hernia     History of cardiac monitoring 06/01/2015    Event - Patient noted to have recurrence of atrial fibrillation with RVR on medical therapy on event monitor    History of Doppler carotid echocardiogram 01/15/2020    No hemodynamically significant stenosis in the internal carotid artery bilaterally, Right internal carotid artery exhibits mild homogenous plaque, Left internal exhibits minimal homogenous plaque, totuosity of the bilateral internal carotid arteries    History of Holter monitoring 05/19/2015    24 hour - predominant rhythm was sinus intermittent paced, paced rhythm can not be ruled out d/t pacemaker function being turned off during device setup, limited pacemaker data available    History of nuclear stress test 11/10/2016    lexiscan-normal,EF70%    HX OTHER MEDICAL 05/02/2012 5/2/2012-PERIPHERAL ANGIOGRAM-Normal-Dr Reyes Covarrubias    HX OTHER MEDICAL 03/02/2011    3/2/2011-48 HR HOLTER MONITOR-Pred Rhythm is sinus rhythm. No arrhythmias.  Hyperlipidemia     Hypertension     Nuclear Stress 07/07/2021    EF 53%.     S/P angioplasty with stent 3/4/2010, 2/2001    3/4/2010-PTCA with 2.75 X 38 mm stent to LAD; 2/2001-PTCA with stent to LAD    S/P cardiac pacemaker procedure 06/04/2013    battery replacement    Sleep apnea     has CPAP    WD-Chronic venous hypertension (idiopathic) with ulcer of bilateral lower extremity (CODE) ([facility] [address] 2/18/2022    WD-Non-pressure chronic ulcer of other part of left lower leg limited to breakdown of skin ([facility] [address] 2/18/2022    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 ([facility] [address] 2/18/2022       PAST SURGICAL HISTORY    Past Surgical History:   Procedure Laterality Date    ATRIAL ABLATION SURGERY  8/11/15    Atrial fib/flutter ablation     CARDIAC CATHETERIZATION  06/14/2015    no significant disease    CARDIAC PACEMAKER PLACEMENT  4/2001 4/2001-[employer] PPM Integrity DDD Model 5342    CARPAL TUNNEL RELEASE Bilateral 1976    CORONARY ANGIOPLASTY WITH STENT PLACEMENT  3/4/2010, 2/2001    3/4/2010-PTCA with 2.75 X 38 mm stent to LAD; 2/2001-PTCA with stent to LAD;    DIAGNOSTIC CARDIAC CATH LAB PROCEDURE  5/2/2012,2/22/2011, 3/4/2010, 2/2001 5/2/2012-[facility]-No evid of signif CAD; Normal peripheral angiogram; 2/22/2011-No signif CAD, stent patent, had tachycardic event; 3/4/2010-Severe stenosis and re in-stent stenosis of LAD    FINGER TRIGGER RELEASE Left 1994    thumb    HAND TENDON SURGERY Left 1976    Left middle finger    HERNIA REPAIR Bilateral 1993    bilateral inguinal hernias    JOINT REPLACEMENT      knees bilaterally-Right 10/2009; 1/2008-Left    KNEE ARTHROSCOPY  11/2005, 1989 11/2005, 1989-Right knee    LUMBAR DISC ARTHROPLASTY  07/08/2016    OTHER SURGICAL HISTORY  5/2/2012 5/2/2012-PERIPHERAL ANGIOGRAM-Normal    UPPER GASTROINTESTINAL ENDOSCOPY  5/3/2001    5/3/3430-[facility])       FAMILY HISTORY    Family History   Problem Relation Age of Onset    Heart Disease Mother     High Blood Pressure Mother     Stroke Father     Coronary Art Dis Father         MI    Other Brother         accidental    Brain Cancer Paternal Grandfather        SOCIAL HISTORY    Social History     Tobacco Use    Smoking status: Never Smoker    Smokeless tobacco: Never Used Vaping Use    Vaping Use: Never used   Substance Use Topics    Alcohol use: No     Alcohol/week: 0.0 standard drinks    Drug use: No       ALLERGIES    Allergies   Allergen Reactions    Ceclor [Cefaclor] Rash    Celebrex [Celecoxib] Rash    Pcn [Penicillins] Rash    Relafen [Nabumetone] Rash       MEDICATIONS    Current Outpatient Medications on File Prior to Encounter   Medication Sig Dispense Refill    amLODIPine (NORVASC) 5 MG tablet       TRAZODONE HCL PO Take by mouth      DULoxetine (CYMBALTA) 30 MG extended release capsule Take 30 mg by mouth daily      ferrous sulfate (IRON 325) 325 (65 Fe) MG tablet Take 325 mg by mouth daily      potassium chloride (KLOR-CON M) 20 MEQ extended release tablet Take 1 tablet by mouth daily      diclofenac-miSOPROStol (ARTHROTEC) 75-0.2 MG per tablet Take 1 tablet by mouth 2 times daily      tiZANidine (ZANAFLEX) 4 MG tablet TAKE 1/2 TABLET BY MOUTH THREE TIMES A DAY FOR 2 DAYS, THEN TAKE 1 TABLET BY MOUTH THREE TIMES A DAY AFTER  2    losartan (COZAAR) 50 MG tablet Take 50 mg by mouth daily       dexlansoprazole (DEXILANT) 60 MG CPDR delayed release capsule Take 60 mg by mouth daily      zolpidem (AMBIEN) 10 MG tablet Take by mouth nightly as needed for Sleep      furosemide (LASIX) 40 MG tablet Take 1 tablet by mouth 2 times daily 180 tablet 3    dronedarone hcl (MULTAQ) 400 MG TABS Take 400 mg by mouth 2 times daily (with meals)      nitroGLYCERIN (NITROLINGUAL) 0.4 MG/SPRAY 0.4 mg spray Place 1 spray under the tongue every 5 minutes as needed for Chest pain      CPAP Machine MISC by Does not apply route nightly      apixaban (ELIQUIS) 5 MG TABS tablet Take 1 tablet by mouth 2 times daily 60 tablet 3    rosuvastatin (CRESTOR) 20 MG tablet Take 20 mg by mouth daily.  pramipexole (MIRAPEX) 1 MG tablet   Take 0.5 mg by mouth 2 times daily Patient takes 1/2 tablet in the am and 1 1/2 tablet at night.       metoprolol (LOPRESSOR) 50 MG tablet Take 50 mg by mouth 2 times daily.  modafinil (PROVIGIL) 100 MG tablet Take 100 mg by mouth daily.  esomeprazole (NEXIUM) 40 MG capsule Take 40 mg by mouth 2 times daily.  aspirin 81 MG chewable tablet Take 81 mg by mouth daily. No current facility-administered medications on file prior to encounter. REVIEW OF SYSTEMS         Constitutional: Negative for systemic symptoms including fever, chills and malaise. Objective:      BP (!) 143/72   Pulse 92   Temp 97.8 °F (36.6 °C) (Temporal)   Resp 18     PHYSICAL EXAM      General: The patient is in no acute distress. Mental status:  Patient is appropriate, is  oriented to place and plan of care. Dermatologic exam: Visual inspection of the periwound reveals the skin to be normal in turgor and texture  Wound exam: see wound description below in procedure note      Assessment:     Problem List Items Addressed This Visit     WD-Chronic venous hypertension (idiopathic) with ulcer of bilateral lower extremity (CODE) ([address] - Primary    Relevant Orders    Initiate Outpatient Wound Care Protocol        Procedure Note    Indications:  Based on my examination of this patient's wound(s) today, sharp excision into necrotic epidermis, dermis and subcutaneous tissue is required to promote healing and evaluate the extent of previous healing. Performed by: Trish Shah MD    Consent obtained: Yes    Time out taken:  Yes    Pain Control: not needed    Debridement:Excisional Debridement    Using curette the wound(s) was/were sharply debrided down through and including the removal of epidermis and dermis.         Devitalized Tissue Debrided:  fibrin, slough and necrotic/eschar    Pre Debridement Measurements:  Are located in the Wound Documentation Flow Sheet    All active wounds listed below with today's date are evaluated  Wound(s)    debrided this date include # : 1     Post  Debridement Measurements:      Wound 02/18/22 #1 Right anterior lower leg (Active)   Wound Image   02/18/22 1551   Dressing Status Clean;Dry; Intact; New dressing applied 02/25/22 1612   Wound Cleansed Wound cleanser; Soap and water 02/25/22 1530   Dressing/Treatment Alginate;Coban/self-adherent bandages 02/25/22 1612   Offloading for Diabetic Foot Ulcers Offloading not required 02/25/22 1530   Wound Length (cm) 5 cm 02/25/22 1530   Wound Width (cm) 0.8 cm 02/25/22 1530   Wound Depth (cm) 0.1 cm 02/25/22 1530   Wound Surface Area (cm^2) 4 cm^2 02/25/22 1530   Change in Wound Size % (l*w) -247.83 02/25/22 1530   Wound Volume (cm^3) 0.4 cm^3 02/25/22 1530   Wound Healing % -248 02/25/22 1530   Post-Procedure Length (cm) 5 cm 02/25/22 1606   Post-Procedure Width (cm) 0.8 cm 02/25/22 1606   Post-Procedure Depth (cm) 0.1 cm 02/25/22 1606   Post-Procedure Surface Area (cm^2) 4 cm^2 02/25/22 1606   Post-Procedure Volume (cm^3) 0.4 cm^3 02/25/22 1606   Distance Tunneling (cm) 0 cm 02/25/22 1530   Tunneling Position ___ O'Clock 0 02/25/22 1530   Undermining Starts ___ O'Clock 0 02/25/22 1530   Undermining Ends___ O'Clock 0 02/25/22 1530   Undermining Maxium Distance (cm) 0 02/25/22 1530   Wound Assessment Pink/red 02/25/22 1530   Drainage Amount Large 02/25/22 1530   Drainage Description Clear 02/25/22 1530   Odor None 02/25/22 1530   Kiki-wound Assessment Intact 02/25/22 1530   Margins Defined edges 02/25/22 1530   Wound Thickness Description not for Pressure Injury Full thickness 02/25/22 1530   Number of days: 7       Wound 02/18/22 #2 Right posterior lower leg cluster (Active)   Wound Image   02/18/22 1551   Dressing Status Clean;Dry; Intact; New dressing applied 02/25/22 1612   Wound Cleansed Soap and water; Wound cleanser 02/25/22 1530   Dressing/Treatment Alginate;Coban/self-adherent bandages 02/25/22 1612   Offloading for Diabetic Foot Ulcers Offloading not required 02/25/22 1530   Wound Length (cm) 7.5 cm 02/25/22 1530   Wound Width (cm) 2.6 cm 02/25/22 1530   Wound Depth (cm) 0.1 cm 02/25/22 1530   Wound Surface Area (cm^2) 19.5 cm^2 02/25/22 1530   Change in Wound Size % (l*w) -550 02/25/22 1530   Wound Volume (cm^3) 1.95 cm^3 02/25/22 1530   Wound Healing % -550 02/25/22 1530   Post-Procedure Length (cm) 7.5 cm 02/25/22 1606   Post-Procedure Width (cm) 2.6 cm 02/25/22 1606   Post-Procedure Depth (cm) 0.1 cm 02/25/22 1606   Post-Procedure Surface Area (cm^2) 19.5 cm^2 02/25/22 1606   Post-Procedure Volume (cm^3) 1.95 cm^3 02/25/22 1606   Distance Tunneling (cm) 0 cm 02/25/22 1530   Tunneling Position ___ O'Clock 0 02/25/22 1530   Undermining Starts ___ O'Clock 0 02/25/22 1530   Undermining Ends___ O'Clock 0 02/25/22 1530   Undermining Maxium Distance (cm) 0 02/25/22 1530   Wound Assessment Pink/red 02/25/22 1530   Drainage Amount Moderate 02/25/22 1530   Drainage Description Clear 02/25/22 1530   Odor None 02/25/22 1530   Kiki-wound Assessment Intact 02/25/22 1530   Margins Defined edges 02/25/22 1530   Number of days: 7       Wound 02/18/22 #3 Left lateral lower leg cluster (Active)   Wound Image   02/18/22 1551   Dressing Status Clean;Dry; Intact; New dressing applied 02/25/22 1612   Wound Cleansed Soap and water; Wound cleanser 02/25/22 1530   Dressing/Treatment Alginate;Coban/self-adherent bandages 02/25/22 1612   Offloading for Diabetic Foot Ulcers Offloading not required 02/25/22 1530   Wound Length (cm) 1.5 cm 02/25/22 1530   Wound Width (cm) 1 cm 02/25/22 1530   Wound Depth (cm) 0.1 cm 02/25/22 1530   Wound Surface Area (cm^2) 1.5 cm^2 02/25/22 1530   Change in Wound Size % (l*w) 66.67 02/25/22 1530   Wound Volume (cm^3) 0.15 cm^3 02/25/22 1530   Wound Healing % 67 02/25/22 1530   Post-Procedure Length (cm) 1.5 cm 02/18/22 1626   Post-Procedure Width (cm) 3 cm 02/18/22 1626   Post-Procedure Depth (cm) 0.1 cm 02/18/22 1626   Post-Procedure Surface Area (cm^2) 4.5 cm^2 02/18/22 1626   Post-Procedure Volume (cm^3) 0.45 cm^3 02/18/22 1626   Distance Tunneling (cm) 0 cm 02/25/22 1530 Tunneling Position ___ O'Clock 0 02/25/22 1530   Undermining Starts ___ O'Clock 0 02/25/22 1530   Undermining Ends___ O'Clock 0 02/25/22 1530   Undermining Maxium Distance (cm) 0 02/25/22 1530   Wound Assessment Pink/red 02/25/22 1530   Drainage Amount Copious 02/25/22 1530   Drainage Description Clear 02/25/22 1530   Odor None 02/25/22 1530   Kiki-wound Assessment Intact 02/25/22 1530   Margins Defined edges 02/25/22 1530   Wound Thickness Description not for Pressure Injury Full thickness 02/25/22 1530   Number of days: 7       Percent of Wound(s) Debrided: approximately 100%    Total  Area  Debrided:  4 sq cm     Bleeding:  Minimal    Hemostasis Achieved:  by pressure    Procedural Pain:  0  / 10     Post Procedural Pain:  0 / 10     Response to treatment:  Well tolerated by patient. Status of wound progress and description from last visit:   Improved- much better today. Plan:       Discharge Instructions         Discharge Instructions        PHYSICIAN ORDERS AND DISCHARGE INSTRUCTIONS     NOTE: Upon discharge from the [address], you will receive a patient experience survey. We would be grateful if you would take the time to fill this survey out.     Wound care order history:                 HORTENSIA's   Right  1.67     Left 1.25            Date: 2/18/2022              Cultures:                Grafts:                Antibiotics:           Continuing wound care orders and information:                 Residence:  Private               Continue home health care with: None at this time               Your wound-care supplies will be provided by:      Wound cleansing:               Do not scrub or use excessive force. Wash hands with soap and water before and after dressing changes. Prior to applying a clean dressing, cleanse wound with normal saline, wound cleanser, or mild soap and water.                Ask the physician or nurse before getting the wound(s) wet in a shower     Daily Wound management:              Keep weight off wounds and reposition every 2 hours. Avoid standing for long periods of time. Apply wraps/stockings in AM and remove at bedtime. If swelling is present, elevate legs to the level of the heart or above for 30 minutes 4-5 times a day and/or when sitting. When taking antibiotics take entire prescription as ordered by physician do not stop taking until medicine is all gone.                                                           Orders for this week: 2/25/22-- please measure for compression                   Rx:     Bilateral Lower Extremities - Wash with soap and water, pat dry. Cover with calcium alginate, and sofsorb. Wrap with coban 2 lite leave in place for 1 week     Discussed Juxta lite for next visit     Follow up with Dr Duane Campos in 1 week in the wound care center. Call [phone] for any questions or concerns.   Date__________   Time____________          Treatment Note Wound 02/18/22 #1 Right anterior lower leg-Dressing/Treatment: Alginate,Coban/self-adherent bandages (calcium alginate, sofsorb, coban 2 lite,)  Wound 02/18/22 #2 Right posterior lower leg cluster-Dressing/Treatment: Alginate,Coban/self-adherent bandages (calcium alginate, sofsorb, coban 2 lite,)  Wound 02/18/22 #3 Left lateral lower leg cluster-Dressing/Treatment: Alginate,Coban/self-adherent bandages (calcium alginate, sofsorb, coban 2 lite,)    Written Patient Dismissal Instructions Given            Electronically signed by Uriel Ferguson MD on 2/25/2022 at 4:57 PM

## 2022-03-03 ENCOUNTER — TELEPHONE (OUTPATIENT)
Dept: WOUND CARE | Age: 82
End: 2022-03-03

## 2022-03-04 ENCOUNTER — HOSPITAL ENCOUNTER (OUTPATIENT)
Dept: WOUND CARE | Age: 82
Discharge: HOME | End: 2022-03-04
Payer: MEDICARE

## 2022-03-04 VITALS
SYSTOLIC BLOOD PRESSURE: 128 MMHG | TEMPERATURE: 98.8 F | DIASTOLIC BLOOD PRESSURE: 61 MMHG | HEART RATE: 64 BPM | RESPIRATION RATE: 16 BRPM

## 2022-03-04 DIAGNOSIS — L97.821 NON-PRESSURE CHRONIC ULCER OF OTHER PART OF LEFT LOWER LEG LIMITED TO BREAKDOWN OF SKIN (HCC): ICD-10-CM

## 2022-03-04 DIAGNOSIS — L97.812 NON-PRESSURE CHRONIC ULCER OF OTHER PART OF RIGHT LOWER LEG WITH FAT LAYER EXPOSED (HCC): ICD-10-CM

## 2022-03-04 DIAGNOSIS — I87.313 CHRONIC VENOUS HYPERTENSION (IDIOPATHIC) WITH ULCER OF BILATERAL LOWER EXTREMITY (CODE) (HCC): Primary | ICD-10-CM

## 2022-03-04 PROCEDURE — 97597 DBRDMT OPN WND 1ST 20 CM/<: CPT

## 2022-03-04 RX ORDER — GINSENG 100 MG
CAPSULE ORAL ONCE
Status: CANCELLED | OUTPATIENT
Start: 2022-03-04 | End: 2022-03-04

## 2022-03-04 RX ORDER — BETAMETHASONE DIPROPIONATE 0.05 %
OINTMENT (GRAM) TOPICAL ONCE
Status: CANCELLED | OUTPATIENT
Start: 2022-03-04 | End: 2022-03-04

## 2022-03-04 RX ORDER — BACITRACIN, NEOMYCIN, POLYMYXIN B 400; 3.5; 5 [USP'U]/G; MG/G; [USP'U]/G
OINTMENT TOPICAL ONCE
Status: CANCELLED | OUTPATIENT
Start: 2022-03-04 | End: 2022-03-04

## 2022-03-04 RX ORDER — LIDOCAINE HYDROCHLORIDE 20 MG/ML
JELLY TOPICAL ONCE
Status: CANCELLED | OUTPATIENT
Start: 2022-03-04 | End: 2022-03-04

## 2022-03-04 RX ORDER — BACITRACIN ZINC AND POLYMYXIN B SULFATE 500; 1000 [USP'U]/G; [USP'U]/G
OINTMENT TOPICAL ONCE
Status: CANCELLED | OUTPATIENT
Start: 2022-03-04 | End: 2022-03-04

## 2022-03-04 RX ORDER — LIDOCAINE 50 MG/G
OINTMENT TOPICAL ONCE
Status: CANCELLED | OUTPATIENT
Start: 2022-03-04 | End: 2022-03-04

## 2022-03-04 RX ORDER — LIDOCAINE HYDROCHLORIDE 40 MG/ML
SOLUTION TOPICAL ONCE
Status: CANCELLED | OUTPATIENT
Start: 2022-03-04 | End: 2022-03-04

## 2022-03-04 RX ORDER — GENTAMICIN SULFATE 1 MG/G
OINTMENT TOPICAL ONCE
Status: CANCELLED | OUTPATIENT
Start: 2022-03-04 | End: 2022-03-04

## 2022-03-04 RX ORDER — CLOBETASOL PROPIONATE 0.5 MG/G
OINTMENT TOPICAL ONCE
Status: CANCELLED | OUTPATIENT
Start: 2022-03-04 | End: 2022-03-04

## 2022-03-04 RX ORDER — LIDOCAINE 40 MG/G
CREAM TOPICAL ONCE
Status: CANCELLED | OUTPATIENT
Start: 2022-03-04 | End: 2022-03-04

## 2022-03-04 NOTE — PROGRESS NOTES
Wound Care Center Progress Note With Procedure    Maritza Redding  AGE: 79 y.o. GENDER: male  : 1940  EPISODE DATE:  3/4/2022     Subjective:     Chief Complaint   Patient presents with    Wound Check     BLE         HISTORY of PRESENT ILLNESS      Maritza Redding is a 79 y.o. male who presents today for wound evaluation of Chronic venous ulcer(s) of the bilateral legs. The ulcer is of mild severity. The underlying cause of the wound is venous stasis and edema of the legs. He is in for f/u- doing well. Tolerating compression well. Has only one wound left open on th left leg and has tiny little part-thickness ulcer on the right. He is in need of compression stockings after healed as this is the best way to prevent recurrent disease. Wound Pain Timing/Severity: none  Quality of pain: N/A  Severity of pain:  0 / 10   Modifying Factors: edema and venous stasis  Associated Signs/Symptoms: none        PAST MEDICAL HISTORY        Diagnosis Date    Aortic regurgitation 2012    4/3/2012-mild; 2011-mild to moderate    Arrhythmia 2011-tachycardia episode during cardiac cath-48 HR Holter monitor normal    Blisters of multiple sites 10/05/2016    CAD (coronary artery disease)     sees Dr Lars Willams, prior Dr Rubina Sorto Cardiac pacemaker 2001-[employer] Integrity DDD Model 5342    Congestive heart failure ([address]     Family history of coronary artery disease     Father-MI    GERD (gastroesophageal reflux disease)     H/O cardiac catheterization 2012, 2011, 3/4/2010, 2012-[facility]-No evid of signif CAD; Normal peripheral angiogram; 2011-No signif CAD, stent patent, had tachycardic event; 3/4/2010-Severe stenosis and re in-stent stenosis of LAD    H/O cardiovascular stress test 2012, 2011-Normal Lexiscan study. No evid of ischemia. Attenuation artifact in inferior region of myocardium. EF 58%.      H/O cardiovascular stress test 03/04/2015    normal, no ischemia, EF62%    H/O cardiovascular stress test 12/06/2017    EF60% normal study    H/O Doppler ultrasound 02/07/2011 2/7/2011-CAROTID US-Normal    H/O Doppler ultrasound 05/24/2013    periphal normal    H/O Doppler ultrasound 06/18/2014 6/14 Normal    H/O Doppler ultrasound 11/13/15 09/03/15    11/15 WNL 9/15Arterial and Venous Doppler is normal.     H/O echocardiogram 03/04/2015    EF60% Normal LV and systolic function. Trace MR, Aortic, and TR. No other valvulopathy.  H/O echocardiogram 08/08/2016    EF60% mild AR, mild phtn    H/O echocardiogram 11/01/2017    EF55-60% sclerotic, non stenotic AV, mild AR, phtn    H/O peptic ulcer 1970s    Hiatal hernia     History of cardiac monitoring 06/01/2015    Event - Patient noted to have recurrence of atrial fibrillation with RVR on medical therapy on event monitor    History of Doppler carotid echocardiogram 01/15/2020    No hemodynamically significant stenosis in the internal carotid artery bilaterally, Right internal carotid artery exhibits mild homogenous plaque, Left internal exhibits minimal homogenous plaque, totuosity of the bilateral internal carotid arteries    History of Holter monitoring 05/19/2015    24 hour - predominant rhythm was sinus intermittent paced, paced rhythm can not be ruled out d/t pacemaker function being turned off during device setup, limited pacemaker data available    History of nuclear stress test 11/10/2016    lexiscan-normal,EF70%    HX OTHER MEDICAL 05/02/2012 5/2/2012-PERIPHERAL ANGIOGRAM-Normal-Dr Marlo Kanner    HX OTHER MEDICAL 03/02/2011    3/2/2011-48 HR HOLTER MONITOR-Pred Rhythm is sinus rhythm. No arrhythmias.  Hyperlipidemia     Hypertension     Nuclear Stress 07/07/2021    EF 53%.     S/P angioplasty with stent 3/4/2010, 2/2001    3/4/2010-PTCA with 2.75 X 38 mm stent to LAD; 2/2001-PTCA with stent to LAD    S/P cardiac pacemaker procedure 06/04/2013    battery Use    Smoking status: Never Smoker    Smokeless tobacco: Never Used   Vaping Use    Vaping Use: Never used   Substance Use Topics    Alcohol use: No     Alcohol/week: 0.0 standard drinks    Drug use: No       ALLERGIES    Allergies   Allergen Reactions    Ceclor [Cefaclor] Rash    Celebrex [Celecoxib] Rash    Pcn [Penicillins] Rash    Relafen [Nabumetone] Rash       MEDICATIONS    Current Outpatient Medications on File Prior to Encounter   Medication Sig Dispense Refill    amLODIPine (NORVASC) 5 MG tablet       TRAZODONE HCL PO Take by mouth      DULoxetine (CYMBALTA) 30 MG extended release capsule Take 30 mg by mouth daily      ferrous sulfate (IRON 325) 325 (65 Fe) MG tablet Take 325 mg by mouth daily      potassium chloride (KLOR-CON M) 20 MEQ extended release tablet Take 1 tablet by mouth daily      diclofenac-miSOPROStol (ARTHROTEC) 75-0.2 MG per tablet Take 1 tablet by mouth 2 times daily      tiZANidine (ZANAFLEX) 4 MG tablet TAKE 1/2 TABLET BY MOUTH THREE TIMES A DAY FOR 2 DAYS, THEN TAKE 1 TABLET BY MOUTH THREE TIMES A DAY AFTER  2    losartan (COZAAR) 50 MG tablet Take 50 mg by mouth daily       dexlansoprazole (DEXILANT) 60 MG CPDR delayed release capsule Take 60 mg by mouth daily      zolpidem (AMBIEN) 10 MG tablet Take by mouth nightly as needed for Sleep      furosemide (LASIX) 40 MG tablet Take 1 tablet by mouth 2 times daily 180 tablet 3    dronedarone hcl (MULTAQ) 400 MG TABS Take 400 mg by mouth 2 times daily (with meals)      CPAP Machine MISC by Does not apply route nightly      apixaban (ELIQUIS) 5 MG TABS tablet Take 1 tablet by mouth 2 times daily 60 tablet 3    rosuvastatin (CRESTOR) 20 MG tablet Take 20 mg by mouth daily.  pramipexole (MIRAPEX) 1 MG tablet   Take 0.5 mg by mouth 2 times daily Patient takes 1/2 tablet in the am and 1 1/2 tablet at night.  metoprolol (LOPRESSOR) 50 MG tablet Take 50 mg by mouth 2 times daily.         modafinil (PROVIGIL) 100 MG tablet Take 100 mg by mouth daily.  esomeprazole (NEXIUM) 40 MG capsule Take 40 mg by mouth 2 times daily.  aspirin 81 MG chewable tablet Take 81 mg by mouth daily.  nitroGLYCERIN (NITROLINGUAL) 0.4 MG/SPRAY 0.4 mg spray Place 1 spray under the tongue every 5 minutes as needed for Chest pain       No current facility-administered medications on file prior to encounter. REVIEW OF SYSTEMS    Pertinent items are noted in HPI. Constitutional: Negative for systemic symptoms including fever, chills and malaise. Objective:      /61   Pulse 64   Temp 98.8 °F (37.1 °C) (Temporal)   Resp 16     PHYSICAL EXAM       General: The patient is in no acute distress. Mental status:  Patient is appropriate, is  oriented to place and plan of care. Dermatologic exam: Visual inspection of the periwound reveals the skin to be normal in turgor and texture  Wound exam: see wound description below in procedure note      Assessment:     Problem List Items Addressed This Visit     WD-Chronic venous hypertension (idiopathic) with ulcer of bilateral lower extremity (CODE) ([address] - Primary    Relevant Orders    Initiate Outpatient Wound Care Protocol    WD-Non-pressure chronic ulcer of other part of left lower leg limited to breakdown of skin ([address]    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 ([address]        Procedure Note    Indications:  Based on my examination of this patient's wound(s) today, sharp excision into necrotic subcutaneous tissue is required to promote healing and evaluate the extent of previous healing. Performed by: Trish Shah MD    Consent obtained: Yes    Time out taken:  Yes    Pain Control: none       Debridement:Excisional Debridement    Using #15 blade scalpel the wound(s) was/were sharply debrided down through and including the removal of epidermis and dermis.         Devitalized Tissue Debrided:  fibrin, biofilm, slough and exudate    Pre Debridement Measurements:  Are located in the Wound Documentation Flow Sheet    All active wounds listed below with today's date are evaluated  Wound(s)    debrided this date include # : 1     Post  Debridement Measurements:      Wound 02/18/22 #1 Right anterior lower leg (Active)   Wound Image   03/04/22 1548   Wound Etiology Venous 03/04/22 1208   Dressing Status Clean;Dry; Intact; New dressing applied 02/25/22 1612   Wound Cleansed Soap and water 03/04/22 1548   Dressing/Treatment Alginate;Coban/self-adherent bandages 02/25/22 1612   Offloading for Diabetic Foot Ulcers Offloading not required 03/04/22 1548   Wound Length (cm) 0 cm 03/04/22 1548   Wound Width (cm) 0 cm 03/04/22 1548   Wound Depth (cm) 0 cm 03/04/22 1548   Wound Surface Area (cm^2) 0 cm^2 03/04/22 1548   Change in Wound Size % (l*w) 100 03/04/22 1548   Wound Volume (cm^3) 0 cm^3 03/04/22 1548   Wound Healing % 100 03/04/22 1548   Post-Procedure Length (cm) 5 cm 02/25/22 1606   Post-Procedure Width (cm) 0.8 cm 02/25/22 1606   Post-Procedure Depth (cm) 0.1 cm 02/25/22 1606   Post-Procedure Surface Area (cm^2) 4 cm^2 02/25/22 1606   Post-Procedure Volume (cm^3) 0.4 cm^3 02/25/22 1606   Distance Tunneling (cm) 0 cm 03/04/22 1548   Tunneling Position ___ O'Clock 0 03/04/22 1548   Undermining Starts ___ O'Clock 0 03/04/22 1548   Undermining Ends___ O'Clock 0 03/04/22 1548   Undermining Maxium Distance (cm) 0 03/04/22 1548   Wound Assessment Dry 03/04/22 1548   Drainage Amount None 03/04/22 1548   Drainage Description Clear 02/25/22 1530   Odor None 03/04/22 1548   Kiki-wound Assessment Intact 03/04/22 1548   Margins Attached edges 03/04/22 1548   Wound Thickness Description not for Pressure Injury Partial thickness 03/04/22 1548   Number of days: 14       Wound 02/18/22 #2 Right posterior lower leg cluster (Active)   Wound Image   03/04/22 1548   Wound Etiology Venous 03/04/22 1208   Dressing Status Clean;Dry; Intact; New dressing applied 02/25/22 1612   Wound Cleansed Soap and water 03/04/22 1548   Dressing/Treatment Alginate;Coban/self-adherent bandages 02/25/22 1612   Offloading for Diabetic Foot Ulcers Offloading not required 03/04/22 1548   Wound Length (cm) 0 cm 03/04/22 1548   Wound Width (cm) 0 cm 03/04/22 1548   Wound Depth (cm) 0 cm 03/04/22 1548   Wound Surface Area (cm^2) 0 cm^2 03/04/22 1548   Change in Wound Size % (l*w) 100 03/04/22 1548   Wound Volume (cm^3) 0 cm^3 03/04/22 1548   Wound Healing % 100 03/04/22 1548   Post-Procedure Length (cm) 7.5 cm 02/25/22 1606   Post-Procedure Width (cm) 2.6 cm 02/25/22 1606   Post-Procedure Depth (cm) 0.1 cm 02/25/22 1606   Post-Procedure Surface Area (cm^2) 19.5 cm^2 02/25/22 1606   Post-Procedure Volume (cm^3) 1.95 cm^3 02/25/22 1606   Distance Tunneling (cm) 0 cm 03/04/22 1548   Tunneling Position ___ O'Clock 0 03/04/22 1548   Undermining Starts ___ O'Clock 0 03/04/22 1548   Undermining Ends___ O'Clock 0 03/04/22 1548   Undermining Maxium Distance (cm) 0 03/04/22 1548   Wound Assessment Dry 03/04/22 1548   Drainage Amount None 03/04/22 1548   Drainage Description Clear 02/25/22 1530   Odor None 03/04/22 1548   Kiki-wound Assessment Intact 03/04/22 1548   Margins Attached edges 03/04/22 1548   Wound Thickness Description not for Pressure Injury Partial thickness 03/04/22 1548   Number of days: 14       Wound 02/18/22 #3 Left lateral lower leg cluster (Active)   Wound Image   03/04/22 1548   Wound Etiology Venous 03/04/22 1208   Dressing Status Clean;Dry; Intact; New dressing applied 02/25/22 1612   Wound Cleansed Soap and water 03/04/22 1548   Dressing/Treatment Alginate;Coban/self-adherent bandages 02/25/22 1612   Offloading for Diabetic Foot Ulcers Offloading not required 03/04/22 1548   Wound Length (cm) 0.9 cm 03/04/22 1548   Wound Width (cm) 0.7 cm 03/04/22 1548   Wound Depth (cm) 0.14 cm 03/04/22 1548   Wound Surface Area (cm^2) 0.63 cm^2 03/04/22 1548   Change in Wound Size % (l*w) 86 03/04/22 1548   Wound Volume (cm^3) 0.0882 cm^3 03/04/22 1548   Wound Healing % 80 03/04/22 1548   Post-Procedure Length (cm) 1.5 cm 02/18/22 1626   Post-Procedure Width (cm) 3 cm 02/18/22 1626   Post-Procedure Depth (cm) 0.1 cm 02/18/22 1626   Post-Procedure Surface Area (cm^2) 4.5 cm^2 02/18/22 1626   Post-Procedure Volume (cm^3) 0.45 cm^3 02/18/22 1626   Distance Tunneling (cm) 0 cm 03/04/22 1548   Tunneling Position ___ O'Clock 0 03/04/22 1548   Undermining Starts ___ O'Clock 0 03/04/22 1548   Undermining Ends___ O'Clock 0 03/04/22 1548   Undermining Maxium Distance (cm) 0 03/04/22 1548   Wound Assessment Pink/red 03/04/22 1548   Drainage Amount Moderate 03/04/22 1548   Drainage Description Serosanguinous 03/04/22 1548   Odor None 03/04/22 1548   Kiki-wound Assessment Intact 03/04/22 1548   Margins Defined edges; Unattached edges 03/04/22 1548   Wound Thickness Description not for Pressure Injury Full thickness 03/04/22 1548   Number of days: 14       Percent of Wound(s) Debrided: approximately 100%    Total  Area  Debrided:  4.5 sq cm     Bleeding:  Minimal    Hemostasis Achieved:  by pressure    Procedural Pain:  0  / 10     Post Procedural Pain:  0 / 10     Response to treatment:  Well tolerated by patient. Status of wound progress and description from last visit:   improved. Plan:       Discharge Instructions         Discharge Instructions        PHYSICIAN ORDERS AND DISCHARGE INSTRUCTIONS     NOTE: Upon discharge from the [address], you will receive a patient experience survey.  We would be grateful if you would take the time to fill this survey out.     Wound care order history:                 HORTENSIA's   Right  1.67     Left 1.25            Date: 2/18/2022              Cultures:                Grafts:                Antibiotics:           Continuing wound care orders and information:                 Residence:  Private               Continue home health care with: None at this time             [facility] wound-care supplies will be provided by:      Wound cleansing:               MI not scrub or use excessive force.              Wash hands with soap and water before and after dressing changes.             NHXNU to applying a clean dressing, cleanse wound with normal saline, wound cleanser, or mild soap and water.               Ask the physician or nurse before getting the wound(s) wet in a shower     Daily Wound management:              Keep weight off wounds and reposition every 2 hours.              Avoid standing for long periods of time.              Apply wraps/stockings in AM and remove at bedtime.              If swelling is present, elevate legs to the level of the heart or above for 30 minutes 4-5 times a day and/or when sitting.                                      When taking antibiotics take entire prescription as ordered by physician do not stop taking until medicine is all gone.                                                           Orders for this week: 3/4/22-- please measure for compression                   Rx:     Bilateral Lower Extremities - Wash with soap and water, pat dry. Cover with calcium alginate, and sofsorb. Wrap with coban 2 lite leave in place for 1 week     Discussed Juxta lite for next visit      Follow up with Dr Hedy Doe in 1 week in the wound care center. Call [phone] for any questions or concerns.   Date__________   Time____________            Treatment Note Wound 02/18/22 #1 Right anterior lower leg-Dressing/Treatment:  (ca+ alginate sofsorb coban2 lite)  Wound 02/18/22 #2 Right posterior lower leg cluster-Dressing/Treatment:  (ca+ alginate sofsorb coban2 lite)  Wound 02/18/22 #3 Left lateral lower leg cluster-Dressing/Treatment:  (ca+ alginate sofsorb coban2 lite)    Written Patient Dismissal Instructions Given            Electronically signed by Clement Ashley MD on 3/4/2022 at 4:31 PM

## 2022-03-04 NOTE — PROGRESS NOTES
Multilayer Compression Wrap   (Not Unna) Below the Knee    NAME:  Gallo Jimenez  YOB: 1940  MEDICAL RECORD NUMBER:  [id number]  DATE:  3/4/2022    Multilayer compression wrap: Removed old Multilayer wrap if indicated and wash leg with mild soap/water. Applied moisturizing agent to dry skin as needed. Applied primary and secondary dressing as ordered. Applied multilayered dressing below the knee to right lower leg. Applied multilayered dressing below the knee to left lower leg. Instructed patient/caregiver not to remove dressing and to keep it clean and dry. Instructed patient/caregiver on complications to report to provider, such as pain, numbness in toes, heavy drainage, and slippage of dressing. Instructed patient on purpose of compression dressing and on activity and exercise recommendations.       Electronically signed by Neto Howell RN on 3/4/2022 at 4:33 PM

## 2022-03-11 ENCOUNTER — HOSPITAL ENCOUNTER (OUTPATIENT)
Dept: WOUND CARE | Age: 82
Discharge: HOME | End: 2022-03-11
Payer: MEDICARE

## 2022-03-11 VITALS
RESPIRATION RATE: 16 BRPM | DIASTOLIC BLOOD PRESSURE: 75 MMHG | HEART RATE: 76 BPM | TEMPERATURE: 98.6 F | SYSTOLIC BLOOD PRESSURE: 133 MMHG

## 2022-03-11 DIAGNOSIS — L97.812 NON-PRESSURE CHRONIC ULCER OF OTHER PART OF RIGHT LOWER LEG WITH FAT LAYER EXPOSED (HCC): ICD-10-CM

## 2022-03-11 DIAGNOSIS — I87.313 CHRONIC VENOUS HYPERTENSION (IDIOPATHIC) WITH ULCER OF BILATERAL LOWER EXTREMITY (CODE) (HCC): Primary | ICD-10-CM

## 2022-03-11 DIAGNOSIS — L97.821 NON-PRESSURE CHRONIC ULCER OF OTHER PART OF LEFT LOWER LEG LIMITED TO BREAKDOWN OF SKIN (HCC): ICD-10-CM

## 2022-03-11 PROCEDURE — 99212 OFFICE O/P EST SF 10 MIN: CPT

## 2022-03-11 RX ORDER — BACITRACIN, NEOMYCIN, POLYMYXIN B 400; 3.5; 5 [USP'U]/G; MG/G; [USP'U]/G
OINTMENT TOPICAL ONCE
Status: CANCELLED | OUTPATIENT
Start: 2022-03-11 | End: 2022-03-11

## 2022-03-11 RX ORDER — BACITRACIN ZINC AND POLYMYXIN B SULFATE 500; 1000 [USP'U]/G; [USP'U]/G
OINTMENT TOPICAL ONCE
Status: CANCELLED | OUTPATIENT
Start: 2022-03-11 | End: 2022-03-11

## 2022-03-11 RX ORDER — LIDOCAINE HYDROCHLORIDE 20 MG/ML
JELLY TOPICAL ONCE
Status: CANCELLED | OUTPATIENT
Start: 2022-03-11 | End: 2022-03-11

## 2022-03-11 RX ORDER — CLOBETASOL PROPIONATE 0.5 MG/G
OINTMENT TOPICAL ONCE
Status: CANCELLED | OUTPATIENT
Start: 2022-03-11 | End: 2022-03-11

## 2022-03-11 RX ORDER — LIDOCAINE HYDROCHLORIDE 40 MG/ML
SOLUTION TOPICAL ONCE
Status: CANCELLED | OUTPATIENT
Start: 2022-03-11 | End: 2022-03-11

## 2022-03-11 RX ORDER — BETAMETHASONE DIPROPIONATE 0.05 %
OINTMENT (GRAM) TOPICAL ONCE
Status: CANCELLED | OUTPATIENT
Start: 2022-03-11 | End: 2022-03-11

## 2022-03-11 RX ORDER — LIDOCAINE 50 MG/G
OINTMENT TOPICAL ONCE
Status: CANCELLED | OUTPATIENT
Start: 2022-03-11 | End: 2022-03-11

## 2022-03-11 RX ORDER — GENTAMICIN SULFATE 1 MG/G
OINTMENT TOPICAL ONCE
Status: CANCELLED | OUTPATIENT
Start: 2022-03-11 | End: 2022-03-11

## 2022-03-11 RX ORDER — GINSENG 100 MG
CAPSULE ORAL ONCE
Status: CANCELLED | OUTPATIENT
Start: 2022-03-11 | End: 2022-03-11

## 2022-03-11 RX ORDER — LIDOCAINE 40 MG/G
CREAM TOPICAL ONCE
Status: CANCELLED | OUTPATIENT
Start: 2022-03-11 | End: 2022-03-11

## 2022-03-11 ASSESSMENT — PAIN SCALES - GENERAL: PAINLEVEL_OUTOF10: 0

## 2022-03-11 NOTE — PROGRESS NOTES
Wound Care Center Progress Note       Vilma Flores  AGE: 79 y.o. GENDER: male  : 1940  TODAY'S DATE:  3/11/2022        Subjective:     Chief Complaint   Patient presents with    Wound Check         HISTORY of PRESENT ILLNESS     Vilma Flores is a 79 y.o. male who presents today for wound evaluation of Chronic venous ulcer(s) of both legs. The ulcer is of mild severity. The underlying cause of the wound is venous hypertension. He is back for f/u- he has no open wounds today! He has received his compression stockings as well. He will need to wear this, daughter will at least be able to help him out sometimes. Wound Pain Timing/Severity: none  Quality of pain: N/A  Severity of pain:  0 / 10   Modifying Factors: edema and venous stasis  Associated Signs/Symptoms: none, edema and erythema        PAST MEDICAL HISTORY        Diagnosis Date    Aortic regurgitation 2012    4/3/2012-mild; 2011-mild to moderate    Arrhythmia 2011-tachycardia episode during cardiac cath-48 HR Holter monitor normal    Blisters of multiple sites 10/05/2016    CAD (coronary artery disease)     sees Dr Myles Cutler, prior Dr Becca Lindsay;   Tee Hernandez Cardiac pacemaker 2001-[employer] PPM Integrity DDD Model 5342    Congestive heart failure ([address]     Family history of coronary artery disease     Father-MI    GERD (gastroesophageal reflux disease)     H/O cardiac catheterization 2012, 2011, 3/4/2010, 2012-[facility]-No evid of signif CAD; Normal peripheral angiogram; 2011-No signif CAD, stent patent, had tachycardic event; 3/4/2010-Severe stenosis and re in-stent stenosis of LAD    H/O cardiovascular stress test 2012, 2011-Normal Lexiscan study. No evid of ischemia. Attenuation artifact in inferior region of myocardium. EF 58%.      H/O cardiovascular stress test 2015    normal, no ischemia, EF62%    H/O cardiovascular stress test 2017    EF60% normal study    H/O Doppler ultrasound 02/07/2011 2/7/2011-CAROTID US-Normal    H/O Doppler ultrasound 05/24/2013    periphal normal    H/O Doppler ultrasound 06/18/2014 6/14 Normal    H/O Doppler ultrasound 11/13/15 09/03/15    11/15 WNL 9/15Arterial and Venous Doppler is normal.     H/O echocardiogram 03/04/2015    EF60% Normal LV and systolic function. Trace MR, Aortic, and TR. No other valvulopathy.  H/O echocardiogram 08/08/2016    EF60% mild AR, mild phtn    H/O echocardiogram 11/01/2017    EF55-60% sclerotic, non stenotic AV, mild AR, phtn    H/O peptic ulcer 1970s    Hiatal hernia     History of cardiac monitoring 06/01/2015    Event - Patient noted to have recurrence of atrial fibrillation with RVR on medical therapy on event monitor    History of Doppler carotid echocardiogram 01/15/2020    No hemodynamically significant stenosis in the internal carotid artery bilaterally, Right internal carotid artery exhibits mild homogenous plaque, Left internal exhibits minimal homogenous plaque, totuosity of the bilateral internal carotid arteries    History of Holter monitoring 05/19/2015    24 hour - predominant rhythm was sinus intermittent paced, paced rhythm can not be ruled out d/t pacemaker function being turned off during device setup, limited pacemaker data available    History of nuclear stress test 11/10/2016    lexiscan-normal,EF70%    HX OTHER MEDICAL 05/02/2012 5/2/2012-PERIPHERAL ANGIOGRAM-Normal-Dr Madiha Collazo    HX OTHER MEDICAL 03/02/2011    3/2/2011-48 HR HOLTER MONITOR-Pred Rhythm is sinus rhythm. No arrhythmias.  Hyperlipidemia     Hypertension     Nuclear Stress 07/07/2021    EF 53%.     S/P angioplasty with stent 3/4/2010, 2/2001    3/4/2010-PTCA with 2.75 X 38 mm stent to LAD; 2/2001-PTCA with stent to LAD    S/P cardiac pacemaker procedure 06/04/2013    battery replacement    Sleep apnea     has CPAP    WD-Chronic venous hypertension (idiopathic) with ulcer of bilateral lower extremity (CODE) ([facility] [address] 2/18/2022    WD-Non-pressure chronic ulcer of other part of left lower leg limited to breakdown of skin ([facility] [address] 2/18/2022    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 ([facility] [address] 2/18/2022       PAST SURGICAL HISTORY    Past Surgical History:   Procedure Laterality Date    ATRIAL ABLATION SURGERY  8/11/15    Atrial fib/flutter ablation     CARDIAC CATHETERIZATION  06/14/2015    no significant disease    CARDIAC PACEMAKER PLACEMENT  4/2001 4/2001-[employer] PPM Integrity DDD Model 5342    CARPAL TUNNEL RELEASE Bilateral 1976    CORONARY ANGIOPLASTY WITH STENT PLACEMENT  3/4/2010, 2/2001    3/4/2010-PTCA with 2.75 X 38 mm stent to LAD; 2/2001-PTCA with stent to LAD;    DIAGNOSTIC CARDIAC CATH LAB PROCEDURE  5/2/2012,2/22/2011, 3/4/2010, 2/2001 5/2/2012-[facility]-No evid of signif CAD; Normal peripheral angiogram; 2/22/2011-No signif CAD, stent patent, had tachycardic event; 3/4/2010-Severe stenosis and re in-stent stenosis of LAD    FINGER TRIGGER RELEASE Left 1994    thumb    HAND TENDON SURGERY Left 1976    Left middle finger    HERNIA REPAIR Bilateral 1993    bilateral inguinal hernias    JOINT REPLACEMENT      knees bilaterally-Right 10/2009; 1/2008-Left    KNEE ARTHROSCOPY  11/2005, 1989 11/2005, 1989-Right knee    LUMBAR DISC ARTHROPLASTY  07/08/2016    OTHER SURGICAL HISTORY  5/2/2012 5/2/2012-PERIPHERAL ANGIOGRAM-Normal    UPPER GASTROINTESTINAL ENDOSCOPY  5/3/2001    5/3/9315-[facility])       FAMILY HISTORY    Family History   Problem Relation Age of Onset    Heart Disease Mother     High Blood Pressure Mother     Stroke Father     Coronary Art Dis Father         MI    Other Brother         accidental    Brain Cancer Paternal Grandfather        SOCIAL HISTORY    Social History     Tobacco Use    Smoking status: Never Smoker    Smokeless tobacco: Never Used   Vaping Use    Vaping Use: Never used   Substance Use Topics    Alcohol use: No     Alcohol/week: 0.0 standard drinks    Drug use: No       ALLERGIES    Allergies   Allergen Reactions    Ceclor [Cefaclor] Rash    Celebrex [Celecoxib] Rash    Pcn [Penicillins] Rash    Relafen [Nabumetone] Rash       MEDICATIONS    Current Outpatient Medications on File Prior to Encounter   Medication Sig Dispense Refill    amLODIPine (NORVASC) 5 MG tablet       TRAZODONE HCL PO Take by mouth      DULoxetine (CYMBALTA) 30 MG extended release capsule Take 30 mg by mouth daily      ferrous sulfate (IRON 325) 325 (65 Fe) MG tablet Take 325 mg by mouth daily      potassium chloride (KLOR-CON M) 20 MEQ extended release tablet Take 1 tablet by mouth daily      diclofenac-miSOPROStol (ARTHROTEC) 75-0.2 MG per tablet Take 1 tablet by mouth 2 times daily      tiZANidine (ZANAFLEX) 4 MG tablet TAKE 1/2 TABLET BY MOUTH THREE TIMES A DAY FOR 2 DAYS, THEN TAKE 1 TABLET BY MOUTH THREE TIMES A DAY AFTER  2    losartan (COZAAR) 50 MG tablet Take 50 mg by mouth daily       dexlansoprazole (DEXILANT) 60 MG CPDR delayed release capsule Take 60 mg by mouth daily      zolpidem (AMBIEN) 10 MG tablet Take by mouth nightly as needed for Sleep      furosemide (LASIX) 40 MG tablet Take 1 tablet by mouth 2 times daily 180 tablet 3    dronedarone hcl (MULTAQ) 400 MG TABS Take 400 mg by mouth 2 times daily (with meals)      nitroGLYCERIN (NITROLINGUAL) 0.4 MG/SPRAY 0.4 mg spray Place 1 spray under the tongue every 5 minutes as needed for Chest pain      CPAP Machine MISC by Does not apply route nightly      apixaban (ELIQUIS) 5 MG TABS tablet Take 1 tablet by mouth 2 times daily 60 tablet 3    rosuvastatin (CRESTOR) 20 MG tablet Take 20 mg by mouth daily.  pramipexole (MIRAPEX) 1 MG tablet   Take 0.5 mg by mouth 2 times daily Patient takes 1/2 tablet in the am and 1 1/2 tablet at night.  metoprolol (LOPRESSOR) 50 MG tablet Take 50 mg by mouth 2 times daily.  modafinil (PROVIGIL) 100 MG tablet Take 100 mg by mouth daily.  esomeprazole (NEXIUM) 40 MG capsule Take 40 mg by mouth 2 times daily.  aspirin 81 MG chewable tablet Take 81 mg by mouth daily. No current facility-administered medications on file prior to encounter. REVIEW OF SYSTEMS    Pertinent items are noted in HPI. Constitutional: Negative for systemic symptoms including fever, chills and malaise. Objective:      /75   Pulse 76   Temp 98.6 °F (37 °C)   Resp 16     PHYSICAL EXAM       General: The patient is in no acute distress. Mental status:  Patient is appropriate, is  oriented to place and plan of care. Dermatologic exam: Visual inspection of the periwound reveals the skin to be normal in turgor and texture. Wound exam:  see wound description below     All active wounds listed below with today's date are evaluated      Wound 10/05/16 Venous ulcer WOUND #1 RT PROXIMAL LEG(ONSET 2 MTHS) (Active)   Number of days: 1983       Wound 10/05/16 Venous ulcer WOUND #2 RT LATERAL LEG CLUSTER (ONSET 2MTHS) (Active)   Number of days: 1983       Wound 10/05/16 Venous ulcer WOUND #3RT DISTAL LEG(ONSET 2 MTHS) (Active)   Number of days: 1983       Wound 10/05/16 Venous ulcer WOUND 4 RT MEDIAL LEG (ONSET 2 MTHS) (Active)   Number of days: 1983       Assessment:       Problem List Items Addressed This Visit     WD-Chronic venous hypertension (idiopathic) with ulcer of bilateral lower extremity (CODE) ([address] - Primary    Relevant Orders    Initiate Outpatient Wound Care Protocol    WD-Non-pressure chronic ulcer of other part of left lower leg limited to breakdown of skin ([address]    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 ([address]          Status of wound progress and description from last visit:   Healed wounds today!         Plan:     Discharge Instructions       PHYSICIAN ORDERS AND DISCHARGE INSTRUCTIONS     NOTE: Upon discharge from the [address] [facility], you will receive a patient experience survey. We would be grateful if you would take the time to fill this survey out.     Wound care order history:                 HORTENSIA's   Right  1.67     Left 1.25            Date: 2/18/2022              Cultures:                Grafts:                Antibiotics:           Continuing wound care orders and information:                 Residence:  Private               Continue home health care with: None at this time               Your wound-care supplies will be provided by:      Wound cleansing:               LE not scrub or use excessive force.              Wash hands with soap and water before and after dressing changes.             BDHEA to applying a clean dressing, cleanse wound with normal saline, wound cleanser, or mild soap and water.               Ask the physician or nurse before getting the wound(s) wet in a shower     Daily Wound management:              Keep weight off wounds and reposition every 2 hours.              Avoid standing for long periods of time.              Apply wraps/stockings in AM and remove at bedtime.              If swelling is present, elevate legs to the level of the heart or above for 30 minutes 4-5 times a day and/or when sitting.                                      When taking antibiotics take entire prescription as ordered by physician do not stop taking until medicine is all gone.                                                           Orders for this week: 3/11/22-- please measure for compression                   Rx:     Bilateral Lower Extremities -  Tubi F in clinic today      Discussed Juxta lite for next visit; received on 3/9/2022      Discharge today from  the wound care center. Call [phone] for any questions or concerns.   Date__________   Time____________               Treatment Note      Written Patient Dismissal Instructions Given            Electronically signed by Sahil Sherwood MD on 3/11/2022 at 4:03 PM

## 2022-04-04 ENCOUNTER — PROCEDURE VISIT (OUTPATIENT)
Dept: CARDIOLOGY CLINIC | Age: 82
End: 2022-04-04
Payer: MEDICARE

## 2022-04-04 DIAGNOSIS — Z95.0 CARDIAC PACEMAKER IN SITU: Primary | ICD-10-CM

## 2022-04-04 PROCEDURE — 93294 REM INTERROG EVL PM/LDLS PM: CPT | Performed by: INTERNAL MEDICINE

## 2022-04-04 PROCEDURE — 93296 REM INTERROG EVL PM/IDS: CPT | Performed by: INTERNAL MEDICINE

## 2022-05-24 ENCOUNTER — TELEPHONE (OUTPATIENT)
Dept: PULMONOLOGY | Age: 82
End: 2022-05-24

## 2022-05-24 NOTE — TELEPHONE ENCOUNTER
Returned patients call in regards to an appointment needing to be scheduled for a follow up for the patient's BIPAP machine. Checked his compliance report and showed some leaking of the machine and advised patients daughter in law to call Mason Andrade to see if they would be able to check the machine.  Patient's daughter in law verbally understood and scheduled for the patient to be seen on 08/12/2022 @ 11:15am.

## 2022-07-12 ENCOUNTER — TELEPHONE (OUTPATIENT)
Dept: CARDIOLOGY CLINIC | Age: 82
End: 2022-07-12

## 2022-08-12 ENCOUNTER — OFFICE VISIT (OUTPATIENT)
Dept: PULMONOLOGY | Age: 82
End: 2022-08-12
Payer: MEDICARE

## 2022-08-12 VITALS
DIASTOLIC BLOOD PRESSURE: 80 MMHG | HEART RATE: 84 BPM | HEIGHT: 64 IN | BODY MASS INDEX: 38.24 KG/M2 | OXYGEN SATURATION: 95 % | WEIGHT: 224 LBS | SYSTOLIC BLOOD PRESSURE: 142 MMHG

## 2022-08-12 DIAGNOSIS — J84.9 ILD (INTERSTITIAL LUNG DISEASE) (HCC): ICD-10-CM

## 2022-08-12 DIAGNOSIS — G47.10 HYPERSOMNIA: ICD-10-CM

## 2022-08-12 DIAGNOSIS — G47.33 OSA TREATED WITH BIPAP: ICD-10-CM

## 2022-08-12 DIAGNOSIS — R06.02 SOB (SHORTNESS OF BREATH) ON EXERTION: ICD-10-CM

## 2022-08-12 DIAGNOSIS — I27.20 PULMONARY HYPERTENSION (HCC): ICD-10-CM

## 2022-08-12 PROCEDURE — 1036F TOBACCO NON-USER: CPT | Performed by: INTERNAL MEDICINE

## 2022-08-12 PROCEDURE — G8427 DOCREV CUR MEDS BY ELIG CLIN: HCPCS | Performed by: INTERNAL MEDICINE

## 2022-08-12 PROCEDURE — 99204 OFFICE O/P NEW MOD 45 MIN: CPT | Performed by: INTERNAL MEDICINE

## 2022-08-12 PROCEDURE — G8417 CALC BMI ABV UP PARAM F/U: HCPCS | Performed by: INTERNAL MEDICINE

## 2022-08-12 PROCEDURE — 1123F ACP DISCUSS/DSCN MKR DOCD: CPT | Performed by: INTERNAL MEDICINE

## 2022-08-12 ASSESSMENT — ENCOUNTER SYMPTOMS
COUGH: 0
EYE DISCHARGE: 0
ABDOMINAL PAIN: 0
ABDOMINAL DISTENTION: 0
BACK PAIN: 0
EYE ITCHING: 0
SHORTNESS OF BREATH: 1

## 2022-08-12 NOTE — PROGRESS NOTES
Kieran Nina  1940  Referring Provider: Noy Mendoza MD    Subjective:     Chief Complaint   Patient presents with    Sleep Apnea     Compliance         HPI  Ashley Mcrae is a 79 y.o. male has been referred back for the JAIMIE. He has been diagnosed with severe JAIMIE on a BIPAP 19/15 cm h20 which he is using it every night about 7 hours. He says that it is helping him. He has no loss of weight. He has a FFM. He is less sleepy during the day time. His 2 week download data showed that his residual AHI is 1.3 and leak is 13.3 L/min    He has SOBOE. He hardly walks at home. He has no h/o smoking. He worked as a  building trucks. His ECHO done on 02/21/22 showed: Moderate AI, Mild LVH, ? Diastolic dysfunction, mild Pulmonary HTN. He has bilateral LE swelling. He is on Lasix.      Current Outpatient Medications   Medication Sig Dispense Refill    amLODIPine (NORVASC) 5 MG tablet       TRAZODONE HCL PO Take by mouth      DULoxetine (CYMBALTA) 30 MG extended release capsule Take 30 mg by mouth daily      ferrous sulfate (IRON 325) 325 (65 Fe) MG tablet Take 325 mg by mouth daily      potassium chloride (KLOR-CON M) 20 MEQ extended release tablet Take 1 tablet by mouth daily      diclofenac-miSOPROStol (ARTHROTEC 75) 75-0.2 MG per tablet Take 1 tablet by mouth 2 times daily      tiZANidine (ZANAFLEX) 4 MG tablet TAKE 1/2 TABLET BY MOUTH THREE TIMES A DAY FOR 2 DAYS, THEN TAKE 1 TABLET BY MOUTH THREE TIMES A DAY AFTER  2    losartan (COZAAR) 50 MG tablet Take 50 mg by mouth daily       dexlansoprazole (DEXILANT) 60 MG CPDR delayed release capsule Take 60 mg by mouth daily      zolpidem (AMBIEN) 10 MG tablet Take by mouth nightly as needed for Sleep      furosemide (LASIX) 40 MG tablet Take 1 tablet by mouth 2 times daily 180 tablet 3    dronedarone hcl (MULTAQ) 400 MG TABS Take 400 mg by mouth 2 times daily (with meals)      nitroGLYCERIN (NITROLINGUAL) 0.4 MG/SPRAY 0.4 mg spray Place 1 spray under the tongue every 5 minutes as needed for Chest pain      CPAP Machine MISC by Does not apply route nightly      apixaban (ELIQUIS) 5 MG TABS tablet Take 1 tablet by mouth 2 times daily 60 tablet 3    rosuvastatin (CRESTOR) 20 MG tablet Take 20 mg by mouth daily. pramipexole (MIRAPEX) 1 MG tablet   Take 0.5 mg by mouth 2 times daily Patient takes 1/2 tablet in the am and 1 1/2 tablet at night. metoprolol (LOPRESSOR) 50 MG tablet Take 50 mg by mouth 2 times daily. modafinil (PROVIGIL) 100 MG tablet Take 100 mg by mouth daily. esomeprazole (NEXIUM) 40 MG capsule Take 40 mg by mouth 2 times daily. aspirin 81 MG chewable tablet Take 81 mg by mouth daily. No current facility-administered medications for this visit. Allergies   Allergen Reactions    Ceclor [Cefaclor] Rash    Celebrex [Celecoxib] Rash    Pcn [Penicillins] Rash    Relafen [Nabumetone] Rash       Past Medical History:   Diagnosis Date    Aortic regurgitation 04/03/2012    4/3/2012-mild; 2/7/2011-mild to moderate    Arrhythmia 02/22/2011 2/22/2011-tachycardia episode during cardiac cath-48 HR Holter monitor normal    Blisters of multiple sites 10/05/2016    CAD (coronary artery disease)     sees Dr Blank Has, prior Dr Barrios Shows;    Cardiac pacemaker 04/2001 4/2001-[employer] PPM Integrity DDD Model 5342    Congestive heart failure ([facility] [address]     Family history of coronary artery disease     Father-MI    GERD (gastroesophageal reflux disease)     H/O cardiac catheterization 5/2/2012, 2/22/2011, 3/4/2010, 2/2001 5/2/2012-[facility]-No evid of signif CAD; Normal peripheral angiogram; 2/22/2011-No signif CAD, stent patent, had tachycardic event; 3/4/2010-Severe stenosis and re in-stent stenosis of LAD    H/O cardiovascular stress test 4/16/2012, 2/7/2011 4/16/2012-Normal Lexiscan study. No evid of ischemia. Attenuation artifact in inferior region of myocardium. EF 58%.      H/O cardiovascular stress test 03/04/2015    normal, no ischemia, EF62% H/O cardiovascular stress test 12/06/2017    EF60% normal study    H/O Doppler ultrasound 02/07/2011 2/7/2011-CAROTID US-Normal    H/O Doppler ultrasound 05/24/2013    periphal normal    H/O Doppler ultrasound 06/18/2014 6/14 Normal    H/O Doppler ultrasound 11/13/15 09/03/15    11/15 WNL 9/15Arterial and Venous Doppler is normal.     H/O echocardiogram 03/04/2015    EF60% Normal LV and systolic function. Trace MR, Aortic, and TR. No other valvulopathy. H/O echocardiogram 08/08/2016    EF60% mild AR, mild phtn    H/O echocardiogram 11/01/2017    EF55-60% sclerotic, non stenotic AV, mild AR, phtn    H/O peptic ulcer 1970s    Hiatal hernia     History of cardiac monitoring 06/01/2015    Event - Patient noted to have recurrence of atrial fibrillation with RVR on medical therapy on event monitor    History of Doppler carotid echocardiogram 01/15/2020    No hemodynamically significant stenosis in the internal carotid artery bilaterally, Right internal carotid artery exhibits mild homogenous plaque, Left internal exhibits minimal homogenous plaque, totuosity of the bilateral internal carotid arteries    History of Holter monitoring 05/19/2015    24 hour - predominant rhythm was sinus intermittent paced, paced rhythm can not be ruled out d/t pacemaker function being turned off during device setup, limited pacemaker data available    History of nuclear stress test 11/10/2016    lexiscan-normal,EF70%    HX OTHER MEDICAL 05/02/2012 5/2/2012-PERIPHERAL ANGIOGRAM-Normal-Dr Molina    HX OTHER MEDICAL 03/02/2011    3/2/2011-48 HR HOLTER MONITOR-Pred Rhythm is sinus rhythm. No arrhythmias. Hyperlipidemia     Hypertension     Nuclear Stress 07/07/2021    EF 53%.     S/P angioplasty with stent 3/4/2010, 2/2001    3/4/2010-PTCA with 2.75 X 38 mm stent to LAD; 2/2001-PTCA with stent to LAD    S/P cardiac pacemaker procedure 06/04/2013    battery replacement    Sleep apnea     has CPAP    WD-Chronic venous hypertension (idiopathic) with ulcer of bilateral lower extremity (CODE) ([facility] [address] 2/18/2022    WD-Non-pressure chronic ulcer of other part of left lower leg limited to breakdown of skin ([facility] [address] 2/18/2022    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 ([facility] [address] 2/18/2022       Past Surgical History:   Procedure Laterality Date    ATRIAL ABLATION SURGERY  8/11/15    Atrial fib/flutter ablation     CARDIAC CATHETERIZATION  06/14/2015    no significant disease    CARDIAC PACEMAKER PLACEMENT  4/2001 4/2001-[employer] PPM Integrity DDD Model 5342    CARPAL TUNNEL RELEASE Bilateral 1976    CORONARY ANGIOPLASTY WITH STENT PLACEMENT  3/4/2010, 2/2001    3/4/2010-PTCA with 2.75 X 38 mm stent to LAD; 2/2001-PTCA with stent to LAD;    DIAGNOSTIC CARDIAC CATH LAB PROCEDURE  5/2/2012,2/22/2011, 3/4/2010, 2/2001 5/2/2012-[facility]-No evid of signif CAD; Normal peripheral angiogram; 2/22/2011-No signif CAD, stent patent, had tachycardic event; 3/4/2010-Severe stenosis and re in-stent stenosis of LAD    FINGER TRIGGER RELEASE Left 1994    thumb    HAND TENDON SURGERY Left 1976    Left middle finger    HERNIA REPAIR Bilateral 1993    bilateral inguinal hernias    JOINT REPLACEMENT      knees bilaterally-Right 10/2009; 1/2008-Left    KNEE ARTHROSCOPY  11/2005, 1989 11/2005, 1989-Right knee    LUMBAR DISC ARTHROPLASTY  07/08/2016    OTHER SURGICAL HISTORY  5/2/2012 5/2/2012-PERIPHERAL ANGIOGRAM-Normal    UPPER GASTROINTESTINAL ENDOSCOPY  5/3/2001    5/3/4700-[facility])       Social History     Socioeconomic History    Marital status:       Spouse name: None    Number of children: None    Years of education: None    Highest education level: None   Tobacco Use    Smoking status: Never    Smokeless tobacco: Never   Vaping Use    Vaping Use: Never used   Substance and Sexual Activity    Alcohol use: No     Alcohol/week: 0.0 standard drinks    Drug use: No    Sexual activity: Yes Partners: Female     Comment:         Review of Systems   Constitutional:  Positive for fatigue. HENT:  Negative for congestion and postnasal drip. Eyes:  Negative for discharge and itching. Respiratory:  Positive for shortness of breath. Negative for cough. Cardiovascular:  Positive for leg swelling. Negative for chest pain. Gastrointestinal:  Negative for abdominal distention and abdominal pain. Endocrine: Negative for cold intolerance and heat intolerance. Genitourinary:  Negative for enuresis and frequency. Musculoskeletal:  Negative for arthralgias and back pain. Allergic/Immunologic: Negative for environmental allergies and food allergies. Neurological:  Negative for light-headedness and headaches. Hematological:  Negative for adenopathy. Psychiatric/Behavioral:  Negative for agitation and behavioral problems. Objective:   BP (!) 142/80   Pulse 84   Ht 5' 4\" (1.626 m)   Wt 224 lb (101.6 kg)   SpO2 95%   BMI 38.45 kg/m²   Body mass index is 38.45 kg/m². Sleep Medicine 12/3/2018   Sitting and reading 3   Watching TV 3   Sitting, inactive in a public place (e.g. a theatre or a meeting) 3   As a passenger in a car for an hour without a break 3   Lying down to rest in the afternoon when circumstances permit 2   Sitting and talking to someone 3   Sitting quietly after a lunch without alcohol 3   In a car, while stopped for a few minutes in traffic 2   Total score 22   Neck circumference (Inches) 19.25     Mallampati 3    Physical Exam  Vitals reviewed. Constitutional:       Appearance: Normal appearance. HENT:      Head: Normocephalic and atraumatic. Nose: Nose normal.      Mouth/Throat:      Mouth: Mucous membranes are moist.   Eyes:      Extraocular Movements: Extraocular movements intact. Pupils: Pupils are equal, round, and reactive to light. Cardiovascular:      Rate and Rhythm: Normal rate and regular rhythm. Pulses: Normal pulses.       Heart sounds: Normal heart sounds. Pulmonary:      Effort: Pulmonary effort is normal.      Comments: Occasional basal crackles  Abdominal:      General: Abdomen is flat. Palpations: Abdomen is soft. Musculoskeletal:         General: Normal range of motion. Cervical back: Normal range of motion and neck supple. Skin:     General: Skin is warm and dry. Neurological:      General: No focal deficit present. Mental Status: He is alert and oriented to person, place, and time. Psychiatric:         Mood and Affect: Mood normal.         Behavior: Behavior normal.       Radiology: reviewed    Assessment and Plan     Problem List          Circulatory    Pulmonary hypertension (HCC)      It is mild and it appears to be sec to the diastolic dysfunction, Valvular disorder and JAIMIE  CHF optimization  Diuresis  BIPAP  Get yearly flu vaccine  ECHO in 1 year            Respiratory    ILD (interstitial lung disease) ([address]      He has basal crackles  SOBOE  I'll do a HRCT of chest         Relevant Orders    CT CHEST WO CONTRAST    JAIMIE treated with BiPAP      Advised to be compliant with the BIPAP  Loose weight            Other    Hypersomnia      Advised to be compliant with the BIPAP  Loose weight           SOB (shortness of breath) on exertion      CHF optimization  BIPAP  Loose weight  ECHO in 1 year  PFT  6 MWT           Relevant Orders    Full PFT Study With Bronchodilator    6 Minute Walk Test    CT CHEST WO CONTRAST            Follow-Up:    Return in about 4 weeks (around 9/9/2022) for 6 MWT, PFT.      Progress notes sent to the referring Provider    Justice Jimenez MD MD  8/12/2022  11:44 AM

## 2022-08-12 NOTE — ASSESSMENT & PLAN NOTE
It is mild and it appears to be sec to the diastolic dysfunction, Valvular disorder and JAIMIE  CHF optimization  Diuresis  BIPAP  Get yearly flu vaccine  ECHO in 1 year

## 2022-08-16 ENCOUNTER — OFFICE VISIT (OUTPATIENT)
Dept: CARDIOLOGY CLINIC | Age: 82
End: 2022-08-16
Payer: MEDICARE

## 2022-08-16 VITALS
WEIGHT: 223.4 LBS | HEIGHT: 64 IN | HEART RATE: 65 BPM | SYSTOLIC BLOOD PRESSURE: 132 MMHG | DIASTOLIC BLOOD PRESSURE: 70 MMHG | BODY MASS INDEX: 38.14 KG/M2

## 2022-08-16 DIAGNOSIS — I50.33 ACUTE ON CHRONIC DIASTOLIC CONGESTIVE HEART FAILURE (HCC): ICD-10-CM

## 2022-08-16 DIAGNOSIS — I48.0 PAF (PAROXYSMAL ATRIAL FIBRILLATION) (HCC): ICD-10-CM

## 2022-08-16 DIAGNOSIS — I25.10 CORONARY ARTERY DISEASE INVOLVING NATIVE CORONARY ARTERY OF NATIVE HEART WITHOUT ANGINA PECTORIS: ICD-10-CM

## 2022-08-16 DIAGNOSIS — I10 PRIMARY HYPERTENSION: ICD-10-CM

## 2022-08-16 PROCEDURE — G8427 DOCREV CUR MEDS BY ELIG CLIN: HCPCS | Performed by: NURSE PRACTITIONER

## 2022-08-16 PROCEDURE — 1036F TOBACCO NON-USER: CPT | Performed by: NURSE PRACTITIONER

## 2022-08-16 PROCEDURE — 1123F ACP DISCUSS/DSCN MKR DOCD: CPT | Performed by: NURSE PRACTITIONER

## 2022-08-16 PROCEDURE — 99214 OFFICE O/P EST MOD 30 MIN: CPT | Performed by: NURSE PRACTITIONER

## 2022-08-16 PROCEDURE — G8417 CALC BMI ABV UP PARAM F/U: HCPCS | Performed by: NURSE PRACTITIONER

## 2022-08-16 RX ORDER — TORSEMIDE 20 MG/1
20 TABLET ORAL 2 TIMES DAILY
Qty: 60 TABLET | Refills: 3 | Status: SHIPPED | OUTPATIENT
Start: 2022-08-16

## 2022-08-16 ASSESSMENT — ENCOUNTER SYMPTOMS
ORTHOPNEA: 0
SHORTNESS OF BREATH: 1
BACK PAIN: 1

## 2022-08-16 NOTE — PROGRESS NOTES
8/16/2022  Primary cardiologist: Dr. Astrid Morton:   Alice Calero  is an established 79 y.o.  male here for a 6 month follow up on coronary artery disease, hypertension, hyperlipidemia, atrial fibrillation, pacemaker      SUBJECTIVE/OBJECTIVE:  Alice Calero is a 79 y.o. male with a history of coronary artery disease, hypertension, hyperlipidemia, permanent pacemaker, atrial fibrillation, JAIMIE on CPAP       HPI :   Alice Calero reports he has ongoing shortness of breath with activity. Notes going up steps or doing exertional activities he becomes short of breath. He is sleeping in an adjustable bed for comfort ( mostly for back pain )  . He is following with pulmonology and is scheduled for a 6 minute walk test , PFT and CT of the chest. He has noticed edema to the lower legs that increases as the day progresses. Has productive cough. Review of Systems   Constitutional: Negative for diaphoresis and malaise/fatigue. Cardiovascular:  Positive for dyspnea on exertion. Negative for chest pain, claudication, irregular heartbeat, leg swelling, near-syncope, orthopnea, palpitations and paroxysmal nocturnal dyspnea. Respiratory:  Positive for shortness of breath. Musculoskeletal:  Positive for back pain. Neurological:  Negative for dizziness and light-headedness. Vitals:    08/16/22 1504   BP: 132/70   Site: Right Upper Arm   Position: Sitting   Cuff Size: Medium Adult   Pulse: 65   Weight: 223 lb 6.4 oz (101.3 kg)   Height: 5' 4\" (1.626 m)     No flowsheet data found. Wt Readings from Last 3 Encounters:   08/16/22 223 lb 6.4 oz (101.3 kg)   08/12/22 224 lb (101.6 kg)   01/31/22 226 lb 9.6 oz (102.8 kg)     Body mass index is 38.35 kg/m². Physical Exam  Vitals reviewed. HENT:      Head: Normocephalic. Eyes:      Extraocular Movements: Extraocular movements intact. Neck:      Vascular: No carotid bruit. Cardiovascular:      Rate and Rhythm: Normal rate and regular rhythm. Heart sounds: Normal heart sounds.  No murmur heard. Pulmonary:      Effort: Pulmonary effort is normal.      Breath sounds: No wheezing. Abdominal:      General: There is no distension. Tenderness: There is no abdominal tenderness. Musculoskeletal:      Cervical back: No tenderness. Right lower leg: Edema (2-3 +) present. Left lower leg: Edema (2-3 +) present. Skin:     General: Skin is warm and dry. Capillary Refill: Capillary refill takes less than 2 seconds. Findings: Erythema (lowr legs) present. Neurological:      General: No focal deficit present. Mental Status: He is alert and oriented to person, place, and time.               Current Outpatient Medications   Medication Sig Dispense Refill    amLODIPine (NORVASC) 5 MG tablet       TRAZODONE HCL PO Take by mouth      DULoxetine (CYMBALTA) 30 MG extended release capsule Take 30 mg by mouth daily      ferrous sulfate (IRON 325) 325 (65 Fe) MG tablet Take 325 mg by mouth daily      potassium chloride (KLOR-CON M) 20 MEQ extended release tablet Take 1 tablet by mouth daily      diclofenac-miSOPROStol (ARTHROTEC 75) 75-0.2 MG per tablet Take 1 tablet by mouth 2 times daily      tiZANidine (ZANAFLEX) 4 MG tablet TAKE 1/2 TABLET BY MOUTH THREE TIMES A DAY FOR 2 DAYS, THEN TAKE 1 TABLET BY MOUTH THREE TIMES A DAY AFTER  2    losartan (COZAAR) 50 MG tablet Take 50 mg by mouth daily       dexlansoprazole (DEXILANT) 60 MG CPDR delayed release capsule Take 60 mg by mouth daily      zolpidem (AMBIEN) 10 MG tablet Take by mouth nightly as needed for Sleep      furosemide (LASIX) 40 MG tablet Take 1 tablet by mouth 2 times daily 180 tablet 3    dronedarone hcl (MULTAQ) 400 MG TABS Take 400 mg by mouth 2 times daily (with meals)      nitroGLYCERIN (NITROLINGUAL) 0.4 MG/SPRAY 0.4 mg spray Place 1 spray under the tongue every 5 minutes as needed for Chest pain      CPAP Machine MISC by Does not apply route nightly      apixaban (ELIQUIS) 5 MG TABS tablet Take 1 tablet by mouth 2 times daily 60 tablet 3    rosuvastatin (CRESTOR) 20 MG tablet Take 20 mg by mouth daily. pramipexole (MIRAPEX) 1 MG tablet   Take 0.5 mg by mouth 2 times daily Patient takes 1/2 tablet in the am and 1 1/2 tablet at night. metoprolol (LOPRESSOR) 50 MG tablet Take 50 mg by mouth 2 times daily. modafinil (PROVIGIL) 100 MG tablet Take 100 mg by mouth daily. esomeprazole (NEXIUM) 40 MG capsule Take 40 mg by mouth 2 times daily. aspirin 81 MG chewable tablet Take 81 mg by mouth daily. No current facility-administered medications for this visit. All pertinent data reviewed and discussed with patient       ASSESSMENT/PLAN:    Acute on Chronic diastolic heart failure  Has noted increased peripheral edema along with increased shortness of breath and weight gain. Has been on Lasix for several years dating back to 2016. May be developing Lasix intolerance. Will change to torsemide 20 mg twice daily and titrate as necessary. His swelling may also be secondary to higher intake of fluids and sodium. Notes he likes to eat several watermelons through the week with salt on top  Encouraged to follow a lower sodium diet : we will check BMP in 2 weeks    Coronary artery disease  Stable from coronary artery disease. We will continue medical management including aspirin and metoprolol    PAF   pacemaker analysis today shows episodes of PAF lasting up to 30 minutes. We will continue with his Eliquis 5 mg twice daily for stroke prevention. Denies episodes of bleeding from bowel, gums or bladder.   Monitor renal function closely and if creatinine greater than 1.5 recommend to decrease Eliquis to 2.5 twice daily  Continue with Multaq    Hypertension  Blood pressure control /stable with use of losartan  We will continue present dose     JAIMIE  Using CPAP nightly  Sleeping 6-8 hours   Tests ordered: BMP  Follow-up 3 months    Signed:  MALLORY Yoder CNP, 8/16/2022, 3:13 PM    An electronic signature was used to authenticate this note. Please note this report has been partially produced using speech recognition software and may contain errors related to that system including errors in grammar, punctuation, and spelling, as well as words and phrases that may be inappropriate. If there are any questions or concerns please feel free to contact the dictating provider for clarification.

## 2022-09-02 ENCOUNTER — HOSPITAL ENCOUNTER (OUTPATIENT)
Dept: WOUND CARE | Age: 82
Discharge: HOME | End: 2022-09-02
Payer: MEDICARE

## 2022-09-02 ENCOUNTER — TELEPHONE (OUTPATIENT)
Dept: CARDIOLOGY CLINIC | Age: 82
End: 2022-09-02

## 2022-09-02 VITALS
SYSTOLIC BLOOD PRESSURE: 172 MMHG | TEMPERATURE: 98.9 F | RESPIRATION RATE: 20 BRPM | HEART RATE: 76 BPM | DIASTOLIC BLOOD PRESSURE: 76 MMHG

## 2022-09-02 DIAGNOSIS — I87.313 CHRONIC VENOUS HYPERTENSION (IDIOPATHIC) WITH ULCER OF BILATERAL LOWER EXTREMITY (CODE) (HCC): Primary | ICD-10-CM

## 2022-09-02 DIAGNOSIS — L97.821 NON-PRESSURE CHRONIC ULCER OF OTHER PART OF LEFT LOWER LEG LIMITED TO BREAKDOWN OF SKIN (HCC): ICD-10-CM

## 2022-09-02 DIAGNOSIS — L97.812 NON-PRESSURE CHRONIC ULCER OF OTHER PART OF RIGHT LOWER LEG WITH FAT LAYER EXPOSED (HCC): ICD-10-CM

## 2022-09-02 PROCEDURE — 99213 OFFICE O/P EST LOW 20 MIN: CPT

## 2022-09-02 PROCEDURE — 99203 OFFICE O/P NEW LOW 30 MIN: CPT | Performed by: NURSE PRACTITIONER

## 2022-09-02 RX ORDER — GINSENG 100 MG
CAPSULE ORAL ONCE
Status: CANCELLED | OUTPATIENT
Start: 2022-09-02 | End: 2022-09-02

## 2022-09-02 RX ORDER — BETAMETHASONE DIPROPIONATE 0.05 %
OINTMENT (GRAM) TOPICAL ONCE
Status: CANCELLED | OUTPATIENT
Start: 2022-09-02 | End: 2022-09-02

## 2022-09-02 RX ORDER — GENTAMICIN SULFATE 1 MG/G
OINTMENT TOPICAL ONCE
Status: CANCELLED | OUTPATIENT
Start: 2022-09-02 | End: 2022-09-02

## 2022-09-02 RX ORDER — LIDOCAINE 50 MG/G
OINTMENT TOPICAL ONCE
Status: CANCELLED | OUTPATIENT
Start: 2022-09-02 | End: 2022-09-02

## 2022-09-02 RX ORDER — BACITRACIN ZINC AND POLYMYXIN B SULFATE 500; 1000 [USP'U]/G; [USP'U]/G
OINTMENT TOPICAL ONCE
Status: CANCELLED | OUTPATIENT
Start: 2022-09-02 | End: 2022-09-02

## 2022-09-02 RX ORDER — LIDOCAINE HYDROCHLORIDE 20 MG/ML
JELLY TOPICAL ONCE
Status: CANCELLED | OUTPATIENT
Start: 2022-09-02 | End: 2022-09-02

## 2022-09-02 RX ORDER — CLOBETASOL PROPIONATE 0.5 MG/G
OINTMENT TOPICAL ONCE
Status: CANCELLED | OUTPATIENT
Start: 2022-09-02 | End: 2022-09-02

## 2022-09-02 RX ORDER — GENTAMICIN SULFATE 1 MG/G
OINTMENT TOPICAL ONCE
Status: DISCONTINUED | OUTPATIENT
Start: 2022-09-02 | End: 2022-09-03 | Stop reason: HOSPADM

## 2022-09-02 RX ORDER — LIDOCAINE 40 MG/G
CREAM TOPICAL ONCE
Status: CANCELLED | OUTPATIENT
Start: 2022-09-02 | End: 2022-09-02

## 2022-09-02 RX ORDER — BACITRACIN, NEOMYCIN, POLYMYXIN B 400; 3.5; 5 [USP'U]/G; MG/G; [USP'U]/G
OINTMENT TOPICAL ONCE
Status: CANCELLED | OUTPATIENT
Start: 2022-09-02 | End: 2022-09-02

## 2022-09-02 RX ORDER — LIDOCAINE HYDROCHLORIDE 40 MG/ML
SOLUTION TOPICAL ONCE
Status: CANCELLED | OUTPATIENT
Start: 2022-09-02 | End: 2022-09-02

## 2022-09-02 RX ORDER — DOXYCYCLINE HYCLATE 100 MG
100 TABLET ORAL 2 TIMES DAILY
Qty: 20 TABLET | Refills: 0 | Status: SHIPPED | OUTPATIENT
Start: 2022-09-02 | End: 2022-09-12

## 2022-09-02 ASSESSMENT — PAIN DESCRIPTION - ORIENTATION: ORIENTATION: RIGHT;LEFT

## 2022-09-02 ASSESSMENT — PAIN SCALES - GENERAL: PAINLEVEL_OUTOF10: 10

## 2022-09-02 ASSESSMENT — PAIN DESCRIPTION - DESCRIPTORS: DESCRIPTORS: SHARP

## 2022-09-02 ASSESSMENT — PAIN DESCRIPTION - FREQUENCY: FREQUENCY: INTERMITTENT

## 2022-09-02 ASSESSMENT — PAIN DESCRIPTION - LOCATION: LOCATION: LEG

## 2022-09-02 ASSESSMENT — PAIN DESCRIPTION - ONSET: ONSET: ON-GOING

## 2022-09-02 ASSESSMENT — PAIN DESCRIPTION - PAIN TYPE: TYPE: CHRONIC PAIN

## 2022-09-02 ASSESSMENT — PAIN - FUNCTIONAL ASSESSMENT: PAIN_FUNCTIONAL_ASSESSMENT: PREVENTS OR INTERFERES SOME ACTIVE ACTIVITIES AND ADLS

## 2022-09-02 NOTE — TELEPHONE ENCOUNTER
Left message for patient requesting a return call to schedule an office visit with current [facility] patient for chronic venous hypertension with ulcer of BLE per referral from [facility].

## 2022-09-02 NOTE — DISCHARGE INSTRUCTIONS
PHYSICIAN ORDERS AND DISCHARGE INSTRUCTIONS     NOTE: Upon discharge from the [address], you will receive a patient experience survey. We would be grateful if you would take the time to fill this survey out. Wound care order history:                 HORTENSIA's   Right  1.36     Left 1.29            Date: 9/2/2022              Cultures:                Grafts:                Antibiotics:           Continuing wound care orders and information:                 Residence:  Private               Continue home health care with: None at this time               Your wound-care supplies will be provided by: Wound cleansing:               Do not scrub or use excessive force. Wash hands with soap and water before and after dressing changes. Prior to applying a clean dressing, cleanse wound with normal saline, wound cleanser, or mild soap and water. Ask the physician or nurse before getting the wound(s) wet in a shower     Daily Wound management:              Keep weight off wounds and reposition every 2 hours. Avoid standing for long periods of time. Apply wraps/stockings in AM and remove at bedtime. If swelling is present, elevate legs to the level of the heart or above for 30 minutes 4-5 times a day and/or when sitting. When taking antibiotics take entire prescription as ordered by physician do not stop taking until medicine is all gone. Orders for this week: 9/2/22                  Venous studies ordered 9/2/22    Rx: [address]     Bilateral Lower Extremities - Wash with soap and water, pat dry. Apply Gentamicin to wound beds. Cover wounds and seeping areas with Large Sorbex. Wrap with Coban 2 Lite. Leave in place for 1 week. Follow up with Osvaldo Yen CNP in 1 week in the wound care center.   Call [phone] for any questions or concerns.   Date__________   Time____________

## 2022-09-02 NOTE — PROGRESS NOTES
[address] [facility] Initial Visit      Niyah Kidd  AGE: 79 y.o. GENDER: male  : 1940  EPISODE DATE:  2022   Referred by: Self. Patient is known to us     Subjective:     CHIEF COMPLAINT Venous stasis ulcers to bilat lower extremity      HISTORY of PRESENT ILLNESS      Niyah Kidd is a 79 y.o. male who presents to the [facility] for an initial visit for evaluation and treatment of Acute on chronic venous  ulcer(s) of  R lower leg medial, R lower leg lateral, R lower leg anterior, R lower leg posterior, L lower leg medial, L lower leg lateral, L lower leg anterior, L lower leg posterior. The condition is of moderate severity. The ulcer has been present for 2 weeks, but he is beginning to have this as a reoccurring issues. Patient was last seen in this office in March by Dr. Carson Ortiz. The underlying cause is thought to be venous insufficiency. The patients care to date has included nothing so far. The patient has significant underlying medical conditions as below. Patient is not a diabetic or a smoker. He is on eliquis. He has trouble being compliant with self care at home. He is not able to apply A&D lotion and certainly cannot apply compression leggings. He reports he has been worked up for venous HTN in the past but it has been 2-3 years since last work up.  He is an [facility] patient at the [facility]     Wound Pain Timing/Severity: waxing and waning  Quality of pain: sharp, burning, throbbing, tender  Severity of pain:   10   Modifying Factors: edema, venous stasis, and obesity  Associated Signs/Symptoms: edema, erythema, drainage, and pain        PAST MEDICAL HISTORY        Diagnosis Date    Aortic regurgitation 2012    4/3/2012-mild; 2011-mild to moderate    Arrhythmia 2011-tachycardia episode during cardiac cath-48 HR Holter monitor normal    Blisters of multiple sites 10/05/2016    CAD (coronary artery disease)     sees Dr Tesfaye Antoine, prior Dr Kimi Dinh; paced rhythm can not be ruled out d/t pacemaker function being turned off during device setup, limited pacemaker data available    History of nuclear stress test 11/10/2016    lexiscan-normal,EF70%    HX OTHER MEDICAL 05/02/2012 5/2/2012-PERIPHERAL ANGIOGRAM-Normal-Dr Molina    HX OTHER MEDICAL 03/02/2011    3/2/2011-48 HR HOLTER MONITOR-Pred Rhythm is sinus rhythm. No arrhythmias. Hyperlipidemia     Hypertension     Nuclear Stress 07/07/2021    EF 53%.     S/P angioplasty with stent 3/4/2010, 2/2001    3/4/2010-PTCA with 2.75 X 38 mm stent to LAD; 2/2001-PTCA with stent to LAD    S/P cardiac pacemaker procedure 06/04/2013    battery replacement    Sleep apnea     has CPAP    WD-Chronic venous hypertension (idiopathic) with ulcer of bilateral lower extremity (CODE) ([address] 2/18/2022    WD-Non-pressure chronic ulcer of other part of left lower leg limited to breakdown of skin ([address] 2/18/2022    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 ([address] 2/18/2022       PAST SURGICAL HISTORY    Past Surgical History:   Procedure Laterality Date    ATRIAL ABLATION SURGERY  8/11/15    Atrial fib/flutter ablation     CARDIAC CATHETERIZATION  06/14/2015    no significant disease    CARDIAC PACEMAKER PLACEMENT  4/2001 4/2001-[employer] PPM Integrity DDD Model 5342    CARPAL TUNNEL RELEASE Bilateral 1976    CORONARY ANGIOPLASTY WITH STENT PLACEMENT  3/4/2010, 2/2001    3/4/2010-PTCA with 2.75 X 38 mm stent to LAD; 2/2001-PTCA with stent to LAD;    DIAGNOSTIC CARDIAC CATH LAB PROCEDURE  5/2/2012,2/22/2011, 3/4/2010, 2/2001 5/2/2012-[facility]-No evid of signif CAD; Normal peripheral angiogram; 2/22/2011-No signif CAD, stent patent, had tachycardic event; 3/4/2010-Severe stenosis and re in-stent stenosis of LAD    FINGER TRIGGER RELEASE Left 1994    thumb    HAND TENDON SURGERY Left 1976    Left middle finger    HERNIA REPAIR Bilateral 1993    bilateral inguinal hernias    JOINT REPLACEMENT knees bilaterally-Right 10/2009; 1/2008-Left    KNEE ARTHROSCOPY  11/2005, 1989 11/2005, 1989-Right knee    LUMBAR DISC ARTHROPLASTY  07/08/2016    OTHER SURGICAL HISTORY  5/2/2012 5/2/2012-PERIPHERAL ANGIOGRAM-Normal    UPPER GASTROINTESTINAL ENDOSCOPY  5/3/2001    5/3/0697-[id number])       FAMILY HISTORY    Family History   Problem Relation Age of Onset    Heart Disease Mother     High Blood Pressure Mother     Stroke Father     Coronary Art Dis Father         MI    Other Brother         accidental    Brain Cancer Paternal Grandfather        SOCIAL HISTORY    Social History     Tobacco Use    Smoking status: Never    Smokeless tobacco: Never   Vaping Use    Vaping Use: Never used   Substance Use Topics    Alcohol use: No     Alcohol/week: 0.0 standard drinks    Drug use: No       ALLERGIES    Allergies   Allergen Reactions    Ceclor [Cefaclor] Rash    Celebrex [Celecoxib] Rash    Pcn [Penicillins] Rash    Relafen [Nabumetone] Rash       MEDICATIONS    Current Outpatient Medications on File Prior to Encounter   Medication Sig Dispense Refill    torsemide (DEMADEX) 20 MG tablet Take 1 tablet by mouth in the morning and at bedtime 60 tablet 3    amLODIPine (NORVASC) 5 MG tablet       TRAZODONE HCL PO Take by mouth      DULoxetine (CYMBALTA) 30 MG extended release capsule Take 30 mg by mouth daily      ferrous sulfate (IRON 325) 325 (65 Fe) MG tablet Take 325 mg by mouth daily      potassium chloride (KLOR-CON M) 20 MEQ extended release tablet Take 1 tablet by mouth daily      diclofenac-miSOPROStol (ARTHROTEC 75) 75-0.2 MG per tablet Take 1 tablet by mouth 2 times daily      tiZANidine (ZANAFLEX) 4 MG tablet TAKE 1/2 TABLET BY MOUTH THREE TIMES A DAY FOR 2 DAYS, THEN TAKE 1 TABLET BY MOUTH THREE TIMES A DAY AFTER  2    losartan (COZAAR) 50 MG tablet Take 50 mg by mouth daily       dexlansoprazole (DEXILANT) 60 MG CPDR delayed release capsule Take 60 mg by mouth daily      zolpidem (AMBIEN) 10 MG tablet Take by mouth nightly as needed for Sleep      dronedarone hcl (MULTAQ) 400 MG TABS Take 400 mg by mouth 2 times daily (with meals)      nitroGLYCERIN (NITROLINGUAL) 0.4 MG/SPRAY 0.4 mg spray Place 1 spray under the tongue every 5 minutes as needed for Chest pain      CPAP Machine MISC by Does not apply route nightly      apixaban (ELIQUIS) 5 MG TABS tablet Take 1 tablet by mouth 2 times daily 60 tablet 3    rosuvastatin (CRESTOR) 20 MG tablet Take 20 mg by mouth daily. pramipexole (MIRAPEX) 1 MG tablet   Take 0.5 mg by mouth 2 times daily Patient takes 1/2 tablet in the am and 1 1/2 tablet at night. metoprolol (LOPRESSOR) 50 MG tablet Take 50 mg by mouth 2 times daily. modafinil (PROVIGIL) 100 MG tablet Take 100 mg by mouth daily. esomeprazole (NEXIUM) 40 MG capsule Take 40 mg by mouth 2 times daily. aspirin 81 MG chewable tablet Take 81 mg by mouth daily. No current facility-administered medications on file prior to encounter.        PROBLEM LIST    Patient Active Problem List   Diagnosis    History of PTCA    Hyperlipidemia    Hypertension    Sleep apnea    Claudication ([facility])    Pacer at end of battery life    SSS (sick sinus syndrome) ([facility])    Swelling of calf    CAD (coronary artery disease)    PAF (paroxysmal atrial fibrillation) ([facility])    HTN (hypertension)    Tachycardia    Congestive heart failure ([facility])    Pacemaker    Atrial fibrillation ([facility])    History of cardiac monitoring    S/P ablation of atrial fibrillation    S/P ablation of atrial flutter    Hematuria    Abdominal discomfort    Blisters of multiple sites    Syncope and collapse    JAIMIE treated with BiPAP    Obesity (BMI 35.0-39.9 without comorbidity)    Excessive daytime sleepiness    SOB (shortness of breath) on exertion    Pulmonary hypertension (HCC)    Venous stasis    WD-Chronic venous hypertension (idiopathic) with ulcer of bilateral lower extremity (CODE) ([facility])    WD-Non-pressure chronic ulcer of other part of left lower leg limited to breakdown of skin (HCC)    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 ([facility]    Hypersomnia    ILD (interstitial lung disease) ([facility]       REVIEW OF SYSTEMS    Constitutional: negative for anorexia, chills, fatigue, fevers, malaise, sweats, and weight loss  Respiratory: negative for pleurisy/chest pain, pneumonia, shortness of breath, sputum, stridor, and wheezing  Cardiovascular: negative for near-syncope, orthopnea, palpitations, paroxysmal nocturnal dyspnea, syncope, and tachypnea  Integument/breast: positive for skin lesion(s)      Objective:      BP (!) 172/76   Pulse 76   Temp 98.9 °F (37.2 °C) (Temporal)   Resp 20     PHYSICAL EXAM  General Appearance: alert and oriented to person, place and time, well-developed and well-nourished, in no acute distress  Pulmonary/Chest: clear to auscultation bilaterally- no wheezes, rales or rhonchi, normal air movement, no respiratory distress and no chest wall tenderness  Cardiovascular: normal rate, normal S1 and S2, no gallops, intact distal pulses, and no carotid bruits  Dermatologic exam: Visual inspection of the periwound reveals the skin to be normal in turgor and texture, dry, coarse, scaly, and edematous  Wound exam: see wound description below in procedure note      Assessment:       Yuriy Rosas  appears to have a non-healing wound of the bilat lower extremity. The etiology of the wound is felt to be venous. There are multiple complicating factors including edema, venous stasis, and obesity. A comprehensive wound management program would be helpful to heal this wound. Assessments completed include fall risk and nutritional, functional,and psychological status. At this time appropriate care would include: periodic debridement and wound care as below.        Problem List Items Addressed This Visit          Circulatory    WD-Chronic venous hypertension (idiopathic) with ulcer of bilateral lower extremity (CODE) ([facility] - Primary    Relevant Medications    gentamicin (GARAMYCIN) 0.1 % ointment    Other Relevant Orders    Isai Pimentel MD, Cardiology, [facility]    Initiate Outpatient Wound Care Protocol       Other    WD-Non-pressure chronic ulcer of other part of left lower leg limited to breakdown of skin ([facility]    Relevant Medications    gentamicin (GARAMYCIN) 0.1 % ointment    Other Relevant Orders    Isai Pimentel MD, Cardiology, [facility]    Initiate Outpatient Wound Care Protocol    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 ([facility]    Relevant Medications    gentamicin (GARAMYCIN) 0.1 % ointment    Other Relevant Orders    Isai Pimentel MD, Cardiology, [facility]    Initiate Outpatient Wound Care Protocol       Wound 10/05/16 Venous ulcer WOUND #1 RT PROXIMAL LEG(ONSET 2 MTHS) (Active)   Number of days: 2157       Wound 10/05/16 Venous ulcer WOUND #2 RT LATERAL LEG CLUSTER (ONSET 2MTHS) (Active)   Number of days: 2157       Wound 10/05/16 Venous ulcer WOUND #3RT DISTAL LEG(ONSET 2 MTHS) (Active)   Number of days: 2157       Wound 10/05/16 Venous ulcer WOUND 4 RT MEDIAL LEG (ONSET 2 MTHS) (Active)   Number of days: 2157       Wound 09/02/22 Tibial Right #1 (Active)   Wound Image   09/02/22 1135   Wound Etiology Venous 09/02/22 1135   Dressing Status New dressing applied 09/02/22 1227   Wound Cleansed Wound cleanser 09/02/22 1135   Offloading for Diabetic Foot Ulcers Offloading not required 09/02/22 1227   Wound Length (cm) 1 cm 09/02/22 1135   Wound Width (cm) 1 cm 09/02/22 1135   Wound Depth (cm) 0.1 cm 09/02/22 1135   Wound Surface Area (cm^2) 1 cm^2 09/02/22 1135   Wound Volume (cm^3) 0.1 cm^3 09/02/22 1135   Post-Procedure Length (cm) 1 cm 09/02/22 1159   Post-Procedure Width (cm) 1 cm 09/02/22 1159   Post-Procedure Depth (cm) 0.1 cm 09/02/22 1159   Post-Procedure Surface Area (cm^2) 1 cm^2 09/02/22 8512 Post-Procedure Volume (cm^3) 0.1 cm^3 09/02/22 1159   Tunneling Position ___ O'Clock 0 09/02/22 1135   Undermining Starts ___ O'Clock 0 09/02/22 1135   Undermining Ends___ O'Clock 0 09/02/22 1135   Undermining Maxium Distance (cm) 00 09/02/22 1135   Wound Assessment Pink/red 09/02/22 1135   Drainage Amount Moderate 09/02/22 1135   Drainage Description Serosanguinous 09/02/22 1135   Odor None 09/02/22 1135   Kiki-wound Assessment Intact;Dry/flaky 09/02/22 1135   Margins Defined edges 09/02/22 1135   Wound Thickness Description not for Pressure Injury Full thickness 09/02/22 1135   Number of days: 0       Wound 09/02/22 Tibial Distal;Right;Posterior #2 (Active)   Wound Image   09/02/22 1135   Wound Etiology Venous 09/02/22 1135   Dressing Status New dressing applied 09/02/22 1227   Wound Cleansed Wound cleanser 09/02/22 1135   Offloading for Diabetic Foot Ulcers Offloading not required 09/02/22 1227   Wound Length (cm) 0.5 cm 09/02/22 1135   Wound Width (cm) 0.5 cm 09/02/22 1135   Wound Depth (cm) 0.1 cm 09/02/22 1135   Wound Surface Area (cm^2) 0.25 cm^2 09/02/22 1135   Wound Volume (cm^3) 0.025 cm^3 09/02/22 1135   Post-Procedure Length (cm) 0.5 cm 09/02/22 1159   Post-Procedure Width (cm) 0.5 cm 09/02/22 1159   Post-Procedure Depth (cm) 0.1 cm 09/02/22 1159   Post-Procedure Surface Area (cm^2) 0.25 cm^2 09/02/22 1159   Post-Procedure Volume (cm^3) 0.025 cm^3 09/02/22 1159   Distance Tunneling (cm) 0 cm 09/02/22 1135   Tunneling Position ___ O'Clock 0 09/02/22 1135   Undermining Starts ___ O'Clock 0 09/02/22 1135   Undermining Ends___ O'Clock 00 09/02/22 1135   Undermining Maxium Distance (cm) 0 09/02/22 1135   Wound Assessment Pink/red 09/02/22 1135   Drainage Amount Moderate 09/02/22 1135   Drainage Description Serosanguinous 09/02/22 1135   Odor None 09/02/22 1135   Kiki-wound Assessment Intact 09/02/22 1135   Margins Defined edges 09/02/22 1135   Number of days: 0       Wound 09/02/22 Pretibial Right;Lateral #3 cluster (Active)   Wound Image   09/02/22 1135   Wound Etiology Venous 09/02/22 1135   Dressing Status New dressing applied 09/02/22 1227   Wound Cleansed Wound cleanser 09/02/22 1135   Offloading for Diabetic Foot Ulcers Offloading not required 09/02/22 1227   Wound Length (cm) 4 cm 09/02/22 1135   Wound Width (cm) 1.5 cm 09/02/22 1135   Wound Depth (cm) 0.1 cm 09/02/22 1135   Wound Surface Area (cm^2) 6 cm^2 09/02/22 1135   Wound Volume (cm^3) 0.6 cm^3 09/02/22 1135   Post-Procedure Length (cm) 4 cm 09/02/22 1159   Post-Procedure Width (cm) 1.5 cm 09/02/22 1159   Post-Procedure Depth (cm) 0.1 cm 09/02/22 1159   Post-Procedure Surface Area (cm^2) 6 cm^2 09/02/22 1159   Post-Procedure Volume (cm^3) 0.6 cm^3 09/02/22 1159   Distance Tunneling (cm) 0 cm 09/02/22 1135   Tunneling Position ___ O'Clock 0 09/02/22 1135   Undermining Starts ___ O'Clock 00 09/02/22 1135   Undermining Ends___ O'Clock 0 09/02/22 1135   Undermining Maxium Distance (cm) 0 09/02/22 1135   Wound Assessment Pink/red 09/02/22 1135   Drainage Amount None 09/02/22 1135   Odor None 09/02/22 1135   Kiki-wound Assessment Dry/flaky; Intact 09/02/22 1135   Margins Undefined edges 09/02/22 1135   Number of days: 0       Wound 09/02/22 Pretibial Left;Lateral #4 (Active)   Wound Image   09/02/22 1135   Wound Etiology Venous 09/02/22 1135   Dressing Status New dressing applied 09/02/22 1227   Wound Cleansed Wound cleanser 09/02/22 1135   Offloading for Diabetic Foot Ulcers Offloading not required 09/02/22 1227   Wound Length (cm) 0.6 cm 09/02/22 1135   Wound Width (cm) 0.7 cm 09/02/22 1135   Wound Depth (cm) 0.1 cm 09/02/22 1135   Wound Surface Area (cm^2) 0.42 cm^2 09/02/22 1135   Wound Volume (cm^3) 0.042 cm^3 09/02/22 1135   Post-Procedure Length (cm) 0.6 cm 09/02/22 1159   Post-Procedure Width (cm) 0.7 cm 09/02/22 1159   Post-Procedure Depth (cm) 0.1 cm 09/02/22 1159   Post-Procedure Surface Area (cm^2) 0.42 cm^2 09/02/22 1159   Post-Procedure Volume (cm^3) 0.042 cm^3 09/02/22 1159   Distance Tunneling (cm) 0 cm 09/02/22 1135   Tunneling Position ___ O'Clock 0 09/02/22 1135   Undermining Starts ___ O'Clock 0 09/02/22 1135   Undermining Ends___ O'Clock 0 09/02/22 1135   Undermining Maxium Distance (cm) 0 09/02/22 1135   Drainage Amount None 09/02/22 1135   Odor None 09/02/22 1135   Kiki-wound Assessment Dry/flaky 09/02/22 1135   Margins Attached edges 09/02/22 1135   Number of days: 0       Wounds are all shallow and relatively clean. Patient is too sensitive to agree to debridement and recoils if he legs are touched even slightly  Educated on need for venous workup for long term control. Patient agreeable to wrap today. Doxy sent to pharmacy at this time  Consult sent to his cardiologist for venous work up         Plan:     Discharge instructions:    Discharge Instructions         [address]     NOTE: Upon discharge from the [address], you will receive a patient experience survey. We would be grateful if you would take the time to fill this survey out. Wound care order history:                 HORTENSIA's   Right  1.36     Left 1.29            Date: 9/2/2022              Cultures:                Grafts:                Antibiotics:           Continuing wound care orders and information:                 Residence:  Private               Continue home health care with: None at this time               Your wound-care supplies will be provided by: Wound cleansing:               Do not scrub or use excessive force. Wash hands with soap and water before and after dressing changes. Prior to applying a clean dressing, cleanse wound with normal saline, wound cleanser, or mild soap and water. Ask the physician or nurse before getting the wound(s) wet in a shower     Daily Wound management:              Keep weight off wounds and reposition every 2 hours.               Avoid standing for long periods of time. Apply wraps/stockings in AM and remove at bedtime. If swelling is present, elevate legs to the level of the heart or above for 30 minutes 4-5 times a day and/or when sitting. When taking antibiotics take entire prescription as ordered by physician do not stop taking until medicine is all gone. Orders for this week: 9/2/22                  Venous studies ordered 9/2/22    Rx: [address]     Bilateral Lower Extremities - Wash with soap and water, pat dry. Apply Gentamicin to wound beds. Cover wounds and seeping areas with Large Sorbex. Wrap with Coban 2 Lite. Leave in place for 1 week. Follow up with Valentino Haynes CNP in 1 week in the wound care center. Call [phone] for any questions or concerns.   Date__________   Time____________        Treatment Note Wound 09/02/22 Tibial Right #1-Dressing/Treatment:  (gentamicin, sorbex coban 2 lite.)  Wound 09/02/22 Tibial Distal;Right;Posterior #2-Dressing/Treatment:  (gentamicin, sorbex coban 2 lite.)  Wound 09/02/22 Pretibial Right;Lateral #3 cluster-Dressing/Treatment:  (gentamicin, sorbex coban 2 lite.)  Wound 09/02/22 Pretibial Left;Lateral #4-Dressing/Treatment:  (gentamicin, sorbex coban 2 lite.)    Written Patient Dismissal Instructions Given            Electronically signed by MALLORY Qiu CNP on 9/2/2022 at 1:03 PM

## 2022-09-06 ENCOUNTER — HOSPITAL ENCOUNTER (OUTPATIENT)
Age: 82
Discharge: HOME | End: 2022-09-06
Payer: MEDICARE

## 2022-09-06 ENCOUNTER — TELEPHONE (OUTPATIENT)
Dept: CARDIOLOGY CLINIC | Age: 82
End: 2022-09-06

## 2022-09-06 DIAGNOSIS — I50.33 ACUTE ON CHRONIC DIASTOLIC CONGESTIVE HEART FAILURE (HCC): ICD-10-CM

## 2022-09-06 LAB
ANION GAP SERPL CALCULATED.3IONS-SCNC: 10 MMOL/L (ref 4–16)
BUN BLDV-MCNC: 27 MG/DL (ref 6–23)
CALCIUM SERPL-MCNC: 9 MG/DL (ref 8.3–10.6)
CHLORIDE BLD-SCNC: 101 MMOL/L (ref 99–110)
CO2: 30 MMOL/L (ref 21–32)
CREAT SERPL-MCNC: 1.5 MG/DL (ref 0.9–1.3)
GFR AFRICAN AMERICAN: 54 ML/MIN/1.73M2
GFR NON-AFRICAN AMERICAN: 45 ML/MIN/1.73M2
GLUCOSE BLD-MCNC: 99 MG/DL (ref 70–99)
POTASSIUM SERPL-SCNC: 3.9 MMOL/L (ref 3.5–5.1)
SODIUM BLD-SCNC: 141 MMOL/L (ref 135–145)

## 2022-09-06 PROCEDURE — 80048 BASIC METABOLIC PNL TOTAL CA: CPT

## 2022-09-06 PROCEDURE — 36415 COLL VENOUS BLD VENIPUNCTURE: CPT

## 2022-09-07 ENCOUNTER — TELEPHONE (OUTPATIENT)
Dept: CARDIOLOGY CLINIC | Age: 82
End: 2022-09-07

## 2022-09-07 NOTE — TELEPHONE ENCOUNTER
----- Message from MALLORY Rasmussen CNP sent at 9/7/2022  8:52 AM EDT -----  Please phone patient to see how his edema is - if improved would like to decrease torsemide to daily     GOING TO WOUND CENTER ON 9/9/2022 EDEMA IS NO BETTER  I ADVISED TO STAY ON TORSEMIDE  BID

## 2022-09-09 ENCOUNTER — HOSPITAL ENCOUNTER (OUTPATIENT)
Dept: WOUND CARE | Age: 82
Discharge: HOME | End: 2022-09-09
Payer: MEDICARE

## 2022-09-09 VITALS
HEART RATE: 80 BPM | DIASTOLIC BLOOD PRESSURE: 81 MMHG | TEMPERATURE: 98.4 F | RESPIRATION RATE: 20 BRPM | SYSTOLIC BLOOD PRESSURE: 163 MMHG

## 2022-09-09 DIAGNOSIS — L97.821 NON-PRESSURE CHRONIC ULCER OF OTHER PART OF LEFT LOWER LEG LIMITED TO BREAKDOWN OF SKIN (HCC): ICD-10-CM

## 2022-09-09 DIAGNOSIS — L97.812 NON-PRESSURE CHRONIC ULCER OF OTHER PART OF RIGHT LOWER LEG WITH FAT LAYER EXPOSED (HCC): ICD-10-CM

## 2022-09-09 DIAGNOSIS — I87.313 CHRONIC VENOUS HYPERTENSION (IDIOPATHIC) WITH ULCER OF BILATERAL LOWER EXTREMITY (CODE) (HCC): Primary | ICD-10-CM

## 2022-09-09 PROCEDURE — 11042 DBRDMT SUBQ TIS 1ST 20SQCM/<: CPT

## 2022-09-09 PROCEDURE — 11042 DBRDMT SUBQ TIS 1ST 20SQCM/<: CPT | Performed by: NURSE PRACTITIONER

## 2022-09-09 PROCEDURE — 6370000000 HC RX 637 (ALT 250 FOR IP): Performed by: NURSE PRACTITIONER

## 2022-09-09 RX ORDER — LIDOCAINE 40 MG/G
CREAM TOPICAL ONCE
Status: CANCELLED | OUTPATIENT
Start: 2022-09-09 | End: 2022-09-09

## 2022-09-09 RX ORDER — CLOBETASOL PROPIONATE 0.5 MG/G
OINTMENT TOPICAL ONCE
Status: CANCELLED | OUTPATIENT
Start: 2022-09-09 | End: 2022-09-09

## 2022-09-09 RX ORDER — GENTAMICIN SULFATE 1 MG/G
OINTMENT TOPICAL ONCE
Status: COMPLETED | OUTPATIENT
Start: 2022-09-09 | End: 2022-09-09

## 2022-09-09 RX ORDER — BACITRACIN, NEOMYCIN, POLYMYXIN B 400; 3.5; 5 [USP'U]/G; MG/G; [USP'U]/G
OINTMENT TOPICAL ONCE
Status: CANCELLED | OUTPATIENT
Start: 2022-09-09 | End: 2022-09-09

## 2022-09-09 RX ORDER — LIDOCAINE HYDROCHLORIDE 40 MG/ML
SOLUTION TOPICAL ONCE
Status: CANCELLED | OUTPATIENT
Start: 2022-09-09 | End: 2022-09-09

## 2022-09-09 RX ORDER — BETAMETHASONE DIPROPIONATE 0.05 %
OINTMENT (GRAM) TOPICAL ONCE
Status: CANCELLED | OUTPATIENT
Start: 2022-09-09 | End: 2022-09-09

## 2022-09-09 RX ORDER — GENTAMICIN SULFATE 1 MG/G
OINTMENT TOPICAL ONCE
Status: CANCELLED | OUTPATIENT
Start: 2022-09-09 | End: 2022-09-09

## 2022-09-09 RX ORDER — LIDOCAINE HYDROCHLORIDE 20 MG/ML
JELLY TOPICAL ONCE
Status: CANCELLED | OUTPATIENT
Start: 2022-09-09 | End: 2022-09-09

## 2022-09-09 RX ORDER — LIDOCAINE 50 MG/G
OINTMENT TOPICAL ONCE
Status: CANCELLED | OUTPATIENT
Start: 2022-09-09 | End: 2022-09-09

## 2022-09-09 RX ORDER — BACITRACIN ZINC AND POLYMYXIN B SULFATE 500; 1000 [USP'U]/G; [USP'U]/G
OINTMENT TOPICAL ONCE
Status: CANCELLED | OUTPATIENT
Start: 2022-09-09 | End: 2022-09-09

## 2022-09-09 RX ORDER — GINSENG 100 MG
CAPSULE ORAL ONCE
Status: CANCELLED | OUTPATIENT
Start: 2022-09-09 | End: 2022-09-09

## 2022-09-09 RX ADMIN — GENTAMICIN SULFATE: 1 OINTMENT TOPICAL at 12:03

## 2022-09-09 ASSESSMENT — PAIN DESCRIPTION - FREQUENCY: FREQUENCY: INTERMITTENT

## 2022-09-09 ASSESSMENT — PAIN - FUNCTIONAL ASSESSMENT: PAIN_FUNCTIONAL_ASSESSMENT: PREVENTS OR INTERFERES SOME ACTIVE ACTIVITIES AND ADLS

## 2022-09-09 ASSESSMENT — PAIN DESCRIPTION - DESCRIPTORS: DESCRIPTORS: SHARP

## 2022-09-09 ASSESSMENT — PAIN DESCRIPTION - LOCATION: LOCATION: LEG

## 2022-09-09 ASSESSMENT — PAIN DESCRIPTION - ORIENTATION: ORIENTATION: RIGHT;LEFT

## 2022-09-09 ASSESSMENT — PAIN DESCRIPTION - ONSET: ONSET: ON-GOING

## 2022-09-09 ASSESSMENT — PAIN DESCRIPTION - PAIN TYPE: TYPE: CHRONIC PAIN

## 2022-09-09 NOTE — PROGRESS NOTES
Multilayer Compression Wrap   (Not Unna) Below the Knee    NAME:  Austin Man  YOB: 1940  MEDICAL RECORD NUMBER:  [id number]  DATE:  9/9/2022    Multilayer compression wrap: Removed old Multilayer wrap if indicated and wash leg with mild soap/water. Applied moisturizing agent to dry skin as needed. Applied primary and secondary dressing as ordered. Applied multilayered dressing below the knee to right lower leg. Applied multilayered dressing below the knee to left lower leg. Instructed patient/caregiver not to remove dressing and to keep it clean and dry. Instructed patient/caregiver on complications to report to provider, such as pain, numbness in toes, heavy drainage, and slippage of dressing. Instructed patient on purpose of compression dressing and on activity and exercise recommendations.       Electronically signed by Cirilo Maynard LPN on 0/6/2564 at 40:71 PM

## 2022-09-09 NOTE — DISCHARGE INSTRUCTIONS
PHYSICIAN ORDERS AND DISCHARGE INSTRUCTIONS     NOTE: Upon discharge from the [address], you will receive a patient experience survey. We would be grateful if you would take the time to fill this survey out. Wound care order history:                 HORTENSIA's   Right  1.36     Left 1.29            Date: 9/2/2022              Cultures:                Grafts:                Antibiotics:           Continuing wound care orders and information:                 Residence:  Private               Continue home health care with: None at this time               Your wound-care supplies will be provided by: Wound cleansing:               Do not scrub or use excessive force. Wash hands with soap and water before and after dressing changes. Prior to applying a clean dressing, cleanse wound with normal saline, wound cleanser, or mild soap and water. Ask the physician or nurse before getting the wound(s) wet in a shower     Daily Wound management:              Keep weight off wounds and reposition every 2 hours. Avoid standing for long periods of time. Apply wraps/stockings in AM and remove at bedtime. If swelling is present, elevate legs to the level of the heart or above for 30 minutes 4-5 times a day and/or when sitting. When taking antibiotics take entire prescription as ordered by physician do not stop taking until medicine is all gone. Orders for this week: 9/9/22                  Venous studies ordered 9/2/22     Rx: [address]     Bilateral Lower Extremities - Wash with soap and water, pat dry. Apply Gentamicin to wound beds. Cover wounds and seeping areas with Large Sorbex. Wrap with Coban 2 Lite. Leave in place for 1 week. Follow up with Art Balbuena CNP in 1 week in the wound care center.   Call [phone] for any questions or concerns.   Date__________   Time____________

## 2022-09-09 NOTE — PROGRESS NOTES
Wound Care Center Progress Note With Procedure    Ibis Rubin  AGE: 79 y.o. GENDER: male  : 1940  EPISODE DATE:  2022     Subjective:     Chief Complaint   Patient presents with    Wound Check     BLE         HISTORY of PRESENT ILLNESS      Ibis Rubin is a 79 y.o. male who presents to the [address] [place] [address] for an initial visit for evaluation and treatment of Acute on chronic venous  ulcer(s) of  R lower leg medial, R lower leg lateral, R lower leg anterior, R lower leg posterior, L lower leg medial, L lower leg lateral, L lower leg anterior, L lower leg posterior. The condition is of moderate severity. The ulcer has been present for 2 weeks, but he is beginning to have this as a reoccurring issues. Patient was last seen in this office in March by Dr. Isaac Medellin. The underlying cause is thought to be venous insufficiency. The patients care to date has included nothing so far. The patient has significant underlying medical conditions as below. Great progress this week.  Leg redness and swelling great benefit from compression and wounds have nearly healed   Great progress but needs to make appointment with heart house for venous work up   [facility] to discharge in the next week or 2      Wound Pain Timing/Severity: waxing and waning  Quality of pain: sharp, burning, throbbing, tender  Severity of pain:  5 / 10   Modifying Factors: edema, venous stasis, and obesity  Associated Signs/Symptoms: edema, erythema, drainage, and pain        PAST MEDICAL HISTORY        Diagnosis Date    Aortic regurgitation 2012    4/3/2012-mild; 2011-mild to moderate    Arrhythmia 2011-tachycardia episode during cardiac cath-48 HR Holter monitor normal    Blisters of multiple sites 10/05/2016    CAD (coronary artery disease)     sees Dr Zachary Torres, prior Dr Stacey Edge;    Cardiac pacemaker 2001-[employer] PPM Integrity DDD Model 5342    Congestive heart failure [facility])     Family history of coronary artery disease     Father-MI    GERD (gastroesophageal reflux disease)     H/O cardiac catheterization 5/2/2012, 2/22/2011, 3/4/2010, 2/2001 5/2/2012-[facility]-No evid of signif CAD; Normal peripheral angiogram; 2/22/2011-No signif CAD, stent patent, had tachycardic event; 3/4/2010-Severe stenosis and re in-stent stenosis of LAD    H/O cardiovascular stress test 4/16/2012, 2/7/2011 4/16/2012-Normal Lexiscan study. No evid of ischemia. Attenuation artifact in inferior region of myocardium. EF 58%. H/O cardiovascular stress test 03/04/2015    normal, no ischemia, EF62%    H/O cardiovascular stress test 12/06/2017    EF60% normal study    H/O Doppler ultrasound 02/07/2011 2/7/2011-CAROTID US-Normal    H/O Doppler ultrasound 05/24/2013    periphal normal    H/O Doppler ultrasound 06/18/2014 6/14 Normal    H/O Doppler ultrasound 11/13/15 09/03/15    11/15 WNL 9/15Arterial and Venous Doppler is normal.     H/O echocardiogram 03/04/2015    EF60% Normal LV and systolic function. Trace MR, Aortic, and TR. No other valvulopathy.      H/O echocardiogram 08/08/2016    EF60% mild AR, mild phtn    H/O echocardiogram 11/01/2017    EF55-60% sclerotic, non stenotic AV, mild AR, phtn    H/O peptic ulcer 1970s    Hiatal hernia     History of cardiac monitoring 06/01/2015    Event - Patient noted to have recurrence of atrial fibrillation with RVR on medical therapy on event monitor    History of Doppler carotid echocardiogram 01/15/2020    No hemodynamically significant stenosis in the internal carotid artery bilaterally, Right internal carotid artery exhibits mild homogenous plaque, Left internal exhibits minimal homogenous plaque, totuosity of the bilateral internal carotid arteries    History of Holter monitoring 05/19/2015    24 hour - predominant rhythm was sinus intermittent paced, paced rhythm can not be ruled out d/t pacemaker function being turned off during device setup, limited pacemaker data available History of nuclear stress test 11/10/2016    lexiscan-normal,EF70%    HX OTHER MEDICAL 05/02/2012 5/2/2012-PERIPHERAL ANGIOGRAM-Normal-Dr Molina    HX OTHER MEDICAL 03/02/2011    3/2/2011-48 HR HOLTER MONITOR-Pred Rhythm is sinus rhythm. No arrhythmias. Hyperlipidemia     Hypertension     Nuclear Stress 07/07/2021    EF 53%.     S/P angioplasty with stent 3/4/2010, 2/2001    3/4/2010-PTCA with 2.75 X 38 mm stent to LAD; 2/2001-PTCA with stent to LAD    S/P cardiac pacemaker procedure 06/04/2013    battery replacement    Sleep apnea     has CPAP    WD-Chronic venous hypertension (idiopathic) with ulcer of bilateral lower extremity (CODE) ([address] 2/18/2022    WD-Non-pressure chronic ulcer of other part of left lower leg limited to breakdown of skin ([address] 2/18/2022    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 ([address] 2/18/2022       PAST SURGICAL HISTORY    Past Surgical History:   Procedure Laterality Date    ATRIAL ABLATION SURGERY  8/11/15    Atrial fib/flutter ablation     CARDIAC CATHETERIZATION  06/14/2015    no significant disease    CARDIAC PACEMAKER PLACEMENT  4/2001 4/2001-[employer] PPM Integrity DDD Model 5342    CARPAL TUNNEL RELEASE Bilateral 1976    CORONARY ANGIOPLASTY WITH STENT PLACEMENT  3/4/2010, 2/2001    3/4/2010-PTCA with 2.75 X 38 mm stent to LAD; 2/2001-PTCA with stent to LAD;    DIAGNOSTIC CARDIAC CATH LAB PROCEDURE  5/2/2012,2/22/2011, 3/4/2010, 2/2001 5/2/2012-[facility]-No evid of signif CAD; Normal peripheral angiogram; 2/22/2011-No signif CAD, stent patent, had tachycardic event; 3/4/2010-Severe stenosis and re in-stent stenosis of LAD    FINGER TRIGGER RELEASE Left 1994    thumb    HAND TENDON SURGERY Left 1976    Left middle finger    HERNIA REPAIR Bilateral 1993    bilateral inguinal hernias    JOINT REPLACEMENT      knees bilaterally-Right 10/2009; 1/2008-Left    KNEE ARTHROSCOPY  11/2005, 1989 11/2005, 1989-Right knee    LUMBAR DISC ARTHROPLASTY  07/08/2016    OTHER SURGICAL HISTORY  5/2/2012 5/2/2012-PERIPHERAL ANGIOGRAM-Normal    UPPER GASTROINTESTINAL ENDOSCOPY  5/3/2001    5/3/4972-[facility])       FAMILY HISTORY    Family History   Problem Relation Age of Onset    Heart Disease Mother     High Blood Pressure Mother     Stroke Father     Coronary Art Dis Father         MI    Other Brother         accidental    Brain Cancer Paternal Grandfather        SOCIAL HISTORY    Social History     Tobacco Use    Smoking status: Never    Smokeless tobacco: Never   Vaping Use    Vaping Use: Never used   Substance Use Topics    Alcohol use: No     Alcohol/week: 0.0 standard drinks    Drug use: No       ALLERGIES    Allergies   Allergen Reactions    Ceclor [Cefaclor] Rash    Celebrex [Celecoxib] Rash    Pcn [Penicillins] Rash    Relafen [Nabumetone] Rash       MEDICATIONS    Current Outpatient Medications on File Prior to Encounter   Medication Sig Dispense Refill    doxycycline hyclate (VIBRA-TABS) 100 MG tablet Take 1 tablet by mouth 2 times daily for 10 days 20 tablet 0    torsemide (DEMADEX) 20 MG tablet Take 1 tablet by mouth in the morning and at bedtime 60 tablet 3    amLODIPine (NORVASC) 5 MG tablet       TRAZODONE HCL PO Take by mouth      DULoxetine (CYMBALTA) 30 MG extended release capsule Take 30 mg by mouth daily      ferrous sulfate (IRON 325) 325 (65 Fe) MG tablet Take 325 mg by mouth daily      potassium chloride (KLOR-CON M) 20 MEQ extended release tablet Take 1 tablet by mouth daily      diclofenac-miSOPROStol (ARTHROTEC 75) 75-0.2 MG per tablet Take 1 tablet by mouth 2 times daily      tiZANidine (ZANAFLEX) 4 MG tablet TAKE 1/2 TABLET BY MOUTH THREE TIMES A DAY FOR 2 DAYS, THEN TAKE 1 TABLET BY MOUTH THREE TIMES A DAY AFTER  2    losartan (COZAAR) 50 MG tablet Take 50 mg by mouth daily       dexlansoprazole (DEXILANT) 60 MG CPDR delayed release capsule Take 60 mg by mouth daily      zolpidem (AMBIEN) 10 MG tablet Take by mouth nightly as needed for Sleep      dronedarone hcl (MULTAQ) 400 MG TABS Take 400 mg by mouth 2 times daily (with meals)      nitroGLYCERIN (NITROLINGUAL) 0.4 MG/SPRAY 0.4 mg spray Place 1 spray under the tongue every 5 minutes as needed for Chest pain      CPAP Machine MISC by Does not apply route nightly      apixaban (ELIQUIS) 5 MG TABS tablet Take 1 tablet by mouth 2 times daily 60 tablet 3    rosuvastatin (CRESTOR) 20 MG tablet Take 20 mg by mouth daily. pramipexole (MIRAPEX) 1 MG tablet   Take 0.5 mg by mouth 2 times daily Patient takes 1/2 tablet in the am and 1 1/2 tablet at night. metoprolol (LOPRESSOR) 50 MG tablet Take 50 mg by mouth 2 times daily. modafinil (PROVIGIL) 100 MG tablet Take 100 mg by mouth daily. esomeprazole (NEXIUM) 40 MG capsule Take 40 mg by mouth 2 times daily. aspirin 81 MG chewable tablet Take 81 mg by mouth daily. No current facility-administered medications on file prior to encounter. REVIEW OF SYSTEMS    Pertinent items are noted in HPI. Constitutional: Negative for systemic symptoms including fever, chills and malaise. Objective:      BP (!) 163/81   Pulse 80   Temp 98.4 °F (36.9 °C) (Temporal)   Resp 20     PHYSICAL EXAM      General: The patient is in no acute distress. Mental status:  Patient is appropriate, is  oriented to place and plan of care.   Dermatologic exam: Visual inspection of the periwound reveals the skin to be normal in turgor and texture, dry, and slack  Wound exam: see wound description below in procedure note      Assessment:     Problem List Items Addressed This Visit          Circulatory    WD-Chronic venous hypertension (idiopathic) with ulcer of bilateral lower extremity (CODE) (HCC) - Primary    Relevant Medications    gentamicin (GARAMYCIN) 0.1 % ointment (Start on 9/9/2022 12:15 PM)    Other Relevant Orders    Initiate Outpatient Wound Care Protocol       Other    WD-Non-pressure chronic ulcer of other part of left lower leg limited to breakdown of skin (HCC)    Relevant Medications    gentamicin (GARAMYCIN) 0.1 % ointment (Start on 9/9/2022 12:15 PM)    Other Relevant Orders    Initiate Outpatient Wound Care Protocol    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 ([address]    Relevant Medications    gentamicin (GARAMYCIN) 0.1 % ointment (Start on 9/9/2022 12:15 PM)    Other Relevant Orders    Initiate Outpatient Wound Care Protocol     Procedure Note    Indications:  Based on my examination of this patient's wound(s) today, sharp excision into necrotic subcutaneous tissue is required to promote healing and evaluate the extent of previous healing. Performed by: Agatha Fothergill, APRN - CNP    Consent obtained: Yes    Time out taken:  Yes    Pain Control: N/A      Debridement:Excisional Debridement    Using #15 blade scalpel the wound(s) was/were sharply debrided down through and including the removal of subcutaneous tissue.         Devitalized Tissue Debrided:  fibrin, biofilm, slough, and exudate    Pre Debridement Measurements:  Are located in the Wound Documentation Flow Sheet    All active wounds listed below with today's date are evaluated  Wound(s)    debrided this date include # : 2, 3, and 4     Post  Debridement Measurements:  Wound 10/05/16 Venous ulcer WOUND #1 RT PROXIMAL LEG(ONSET 2 MTHS) (Active)   Number of days: 2164       Wound 10/05/16 Venous ulcer WOUND #2 RT LATERAL LEG CLUSTER (ONSET 2MTHS) (Active)   Number of days: 2164       Wound 10/05/16 Venous ulcer WOUND #3RT DISTAL LEG(ONSET 2 MTHS) (Active)   Number of days: 2164       Wound 10/05/16 Venous ulcer WOUND 4 RT MEDIAL LEG (ONSET 2 MTHS) (Active)   Number of days: 2164       Wound 09/02/22 Tibial Distal;Right;Posterior #2 (Active)   Wound Image   09/02/22 1135   Wound Etiology Venous 09/09/22 1125   Dressing Status New dressing applied 09/02/22 1227   Wound Cleansed Soap and water 09/09/22 1125   Offloading for Diabetic Foot Ulcers Offloading not required 09/09/22 1125   Wound Length (cm) 2.4 cm 09/09/22 1125   Wound Width (cm) 1.5 cm 09/09/22 1125   Wound Depth (cm) 0.1 cm 09/09/22 1125   Wound Surface Area (cm^2) 3.6 cm^2 09/09/22 1125   Change in Wound Size % (l*w) -1340 09/09/22 1125   Wound Volume (cm^3) 0.36 cm^3 09/09/22 1125   Wound Healing % -1340 09/09/22 1125   Post-Procedure Length (cm) 2.4 cm 09/09/22 1153   Post-Procedure Width (cm) 1.5 cm 09/09/22 1153   Post-Procedure Depth (cm) 0.1 cm 09/09/22 1153   Post-Procedure Surface Area (cm^2) 3.6 cm^2 09/09/22 1153   Post-Procedure Volume (cm^3) 0.36 cm^3 09/09/22 1153   Distance Tunneling (cm) 0 cm 09/09/22 1125   Tunneling Position ___ O'Clock 0 09/09/22 1125   Undermining Starts ___ O'Clock 0 09/09/22 1125   Undermining Ends___ O'Clock 0 09/09/22 1125   Undermining Maxium Distance (cm) 0 09/09/22 1125   Wound Assessment [place]/red;Slough 09/09/22 1125   Drainage Amount Small 09/09/22 1125   Drainage Description Serosanguinous;Brown 09/09/22 1125   Odor Mild 09/09/22 1125   Kiki-wound Assessment Fragile;Blanchable erythema 09/09/22 1125   Margins Defined edges 09/09/22 1125   Wound Thickness Description not for Pressure Injury Full thickness 09/09/22 1125   Number of days: 7       Wound 09/02/22 Pretibial Right;Lateral #3 cluster (Active)   Wound Image   09/02/22 1135   Wound Etiology Venous 09/09/22 1125   Dressing Status New dressing applied 09/02/22 1227   Wound Cleansed Soap and water 09/09/22 1125   Offloading for Diabetic Foot Ulcers Offloading not required 09/09/22 1125   Wound Length (cm) 4 cm 09/09/22 1125   Wound Width (cm) 1 cm 09/09/22 1125   Wound Depth (cm) 0.1 cm 09/09/22 1125   Wound Surface Area (cm^2) 4 cm^2 09/09/22 1125   Change in Wound Size % (l*w) 33.33 09/09/22 1125   Wound Volume (cm^3) 0.4 cm^3 09/09/22 1125   Wound Healing % 33 09/09/22 1125   Post-Procedure Length (cm) 4 cm 09/09/22 1153 Post-Procedure Width (cm) 1 cm 09/09/22 1153   Post-Procedure Depth (cm) 0.1 cm 09/09/22 1153   Post-Procedure Surface Area (cm^2) 4 cm^2 09/09/22 1153   Post-Procedure Volume (cm^3) 0.4 cm^3 09/09/22 1153   Distance Tunneling (cm) 0 cm 09/09/22 1125   Tunneling Position ___ O'Clock 0 09/09/22 1125   Undermining Starts ___ O'Clock 0 09/09/22 1125   Undermining Ends___ O'Clock 0 09/09/22 1125   Undermining Maxium Distance (cm) 0 09/09/22 1125   Wound Assessment Pink/red 09/09/22 1125   Drainage Amount Small 09/09/22 1125   Drainage Description Serous; Yellow 09/09/22 1125   Odor None 09/09/22 1125   Kiki-wound Assessment Fragile;Blanchable erythema 09/09/22 1125   Margins Undefined edges 09/09/22 1125   Wound Thickness Description not for Pressure Injury Full thickness 09/09/22 1125   Number of days: 7       Wound 09/02/22 Pretibial Left;Lateral #4 (Active)   Wound Image   09/02/22 1135   Wound Etiology Venous 09/09/22 1125   Dressing Status New dressing applied 09/02/22 1227   Wound Cleansed Soap and water 09/09/22 1125   Offloading for Diabetic Foot Ulcers Offloading not required 09/09/22 1125   Wound Length (cm) 1 cm 09/09/22 1125   Wound Width (cm) 1.2 cm 09/09/22 1125   Wound Depth (cm) 0.1 cm 09/09/22 1125   Wound Surface Area (cm^2) 1.2 cm^2 09/09/22 1125   Change in Wound Size % (l*w) -185.71 09/09/22 1125   Wound Volume (cm^3) 0.12 cm^3 09/09/22 1125   Wound Healing % -186 09/09/22 1125   Post-Procedure Length (cm) 1 cm 09/09/22 1153   Post-Procedure Width (cm) 1.2 cm 09/09/22 1153   Post-Procedure Depth (cm) 0.1 cm 09/09/22 1153   Post-Procedure Surface Area (cm^2) 1.2 cm^2 09/09/22 1153   Post-Procedure Volume (cm^3) 0.12 cm^3 09/09/22 1153   Distance Tunneling (cm) 0 cm 09/09/22 1125   Tunneling Position ___ O'Clock 0 09/09/22 1125   Undermining Starts ___ O'Clock 0 09/09/22 1125   Undermining Ends___ O'Clock 0 09/09/22 1125   Undermining Maxium Distance (cm) 0 09/09/22 1125   Wound Assessment Pink/red;Slough 09/09/22 1125   Drainage Amount Small 09/09/22 1125   Drainage Description Brown;Serosanguinous 09/09/22 1125   Odor Mild 09/09/22 1125   Kiki-wound Assessment Fragile;Blanchable erythema 09/09/22 1125   Margins Attached edges 09/09/22 1125   Wound Thickness Description not for Pressure Injury Full thickness 09/09/22 1125   Number of days: 7       Percent of Wound(s) Debrided: approximately 100%    Total  Area  Debrided:  10 sq cm     Bleeding:  Minimal    Hemostasis Achieved:  by pressure    Procedural Pain:  0  / 10     Post Procedural Pain:  0 / 10     Response to treatment:  Well tolerated by patient. Status of wound progress and description from last visit:   Much improved and hope to heal soon. Patient needs to finish last 3 days of antibiotics       Plan:       Discharge Instructions         [address]     NOTE: Upon discharge from the [address], you will receive a patient experience survey. We would be grateful if you would take the time to fill this survey out. Wound care order history:                 HORTENSIA's   Right  1.36     Left 1.29            Date: 9/2/2022              Cultures:                Grafts:                Antibiotics:           Continuing wound care orders and information:                 Residence:  Private               Continue home health care with: None at this time               Your wound-care supplies will be provided by: Wound cleansing:               Do not scrub or use excessive force. Wash hands with soap and water before and after dressing changes. Prior to applying a clean dressing, cleanse wound with normal saline, wound cleanser, or mild soap and water. Ask the physician or nurse before getting the wound(s) wet in a shower     Daily Wound management:              Keep weight off wounds and reposition every 2 hours. Avoid standing for long periods of time. Apply wraps/stockings in AM and remove at bedtime. If swelling is present, elevate legs to the level of the heart or above for 30 minutes 4-5 times a day and/or when sitting. When taking antibiotics take entire prescription as ordered by physician do not stop taking until medicine is all gone. Orders for this week: 9/9/22                  Venous studies ordered 9/2/22     Rx: [address]     Bilateral Lower Extremities - Wash with soap and water, pat dry. Apply Gentamicin to wound beds. Cover wounds and seeping areas with Large Sorbex. Wrap with Coban 2 Lite. Leave in place for 1 week. Follow up with Rossana Truong CNP in 1 week in the wound care center. Call [phone] for any questions or concerns.   Date__________   Time____________        Treatment Note      Written Patient Dismissal Instructions Given            Electronically signed by MALLORY Osman CNP on 9/9/2022 at 11:59 AM

## 2022-09-13 ENCOUNTER — HOSPITAL ENCOUNTER (OUTPATIENT)
Dept: PULMONOLOGY | Age: 82
Discharge: HOME | End: 2022-09-13
Payer: MEDICARE

## 2022-09-13 ENCOUNTER — HOSPITAL ENCOUNTER (OUTPATIENT)
Dept: CT IMAGING | Age: 82
Discharge: HOME | End: 2022-09-13
Payer: MEDICARE

## 2022-09-13 DIAGNOSIS — J84.9 ILD (INTERSTITIAL LUNG DISEASE) (HCC): ICD-10-CM

## 2022-09-13 DIAGNOSIS — R06.02 SOB (SHORTNESS OF BREATH) ON EXERTION: ICD-10-CM

## 2022-09-13 LAB
DISTANCE WALKED: 385 FT
DLCO %PRED: 78 %
DLCO PRED: NORMAL
DLCO/VA %PRED: NORMAL
DLCO/VA PRED: NORMAL
DLCO/VA: NORMAL
DLCO: NORMAL
EXPIRATORY TIME-POST: NORMAL
EXPIRATORY TIME: NORMAL
FEF 25-75% %CHNG: NORMAL
FEF 25-75% %PRED-POST: NORMAL
FEF 25-75% %PRED-PRE: NORMAL
FEF 25-75% PRED: NORMAL
FEF 25-75%-POST: NORMAL
FEF 25-75%-PRE: NORMAL
FEV1 %PRED-POST: 58 %
FEV1 %PRED-PRE: 54 %
FEV1 PRED: NORMAL
FEV1-POST: NORMAL
FEV1-PRE: NORMAL
FEV1/FVC %PRED-POST: NORMAL
FEV1/FVC %PRED-PRE: NORMAL
FEV1/FVC PRED: NORMAL
FEV1/FVC-POST: 76 %
FEV1/FVC-PRE: 75 %
FVC %PRED-POST: NORMAL
FVC %PRED-PRE: NORMAL
FVC PRED: NORMAL
FVC-POST: NORMAL
FVC-PRE: NORMAL
GAW %PRED: NORMAL
GAW PRED: NORMAL
GAW: NORMAL
IC %PRED: NORMAL
IC PRED: NORMAL
IC: NORMAL
MEP: NORMAL
MIP: NORMAL
MVV %PRED-PRE: NORMAL
MVV PRED: NORMAL
MVV-PRE: NORMAL
PEF %PRED-POST: NORMAL
PEF %PRED-PRE: NORMAL
PEF PRED: NORMAL
PEF%CHNG: NORMAL
PEF-POST: NORMAL
PEF-PRE: NORMAL
RAW %PRED: NORMAL
RAW PRED: NORMAL
RAW: NORMAL
RV %PRED: NORMAL
RV PRED: NORMAL
RV: NORMAL
SPO2: 92 %
SVC %PRED: NORMAL
SVC PRED: NORMAL
SVC: NORMAL
TLC %PRED: 75 %
TLC PRED: NORMAL
TLC: NORMAL
VA %PRED: NORMAL
VA PRED: NORMAL
VA: NORMAL
VTG %PRED: NORMAL
VTG PRED: NORMAL
VTG: NORMAL

## 2022-09-13 PROCEDURE — 71250 CT THORAX DX C-: CPT

## 2022-09-13 PROCEDURE — 94618 PULMONARY STRESS TESTING: CPT

## 2022-09-13 PROCEDURE — 94729 DIFFUSING CAPACITY: CPT

## 2022-09-13 PROCEDURE — 94060 EVALUATION OF WHEEZING: CPT

## 2022-09-13 PROCEDURE — 94726 PLETHYSMOGRAPHY LUNG VOLUMES: CPT

## 2022-09-13 ASSESSMENT — PULMONARY FUNCTION TESTS
FEV1_PERCENT_PREDICTED_PRE: 54
FEV1/FVC_POST: 76
FEV1_PERCENT_PREDICTED_POST: 58
FEV1/FVC_PRE: 75

## 2022-09-13 NOTE — PROGRESS NOTES
[facility] Pulmonary Function Lab - Six Minute Walk  Test Performed on: Room Air___X__ Oxygen at _____ lpm via N/C  Assist Device Used During Test:    None____ Cane____ Walker__X_   Shortness of Breath - Inge's Scale  0 Nothing at all 5 Severe    0.5 Very very slight 6    1 Very slight 7 Very severe   2 Slight 8     3 Moderate 9 Very very severe   4 Somewhat severe 10 Maximal      Time SpO2 Heart Rate Respiratory Rate Modified Inges Scale Other      Baseline      93           %  67 16 0           1 minute                   92          % 83   3           2 minutes              92         %  90  5           3 minutes             92           %  88     5           4 minutes          92            %  89      5           5 minutes       93           %  88      5           6 minutes          93           %  88     5        Recovery   x 1 Min                  95        %  79 26 3        Recovery   x 2 Min               96           %  70       2         Number of Laps__2_ X 170 feet _340__+ _45__ additional feet = Total _385___feet  Stopped or paused before 6 minutes?  No____ Yes __X___   Pre Blood Pressure: _129_ / _62__    Post Blood Pressure:_162  _/_67__  Interpretation:

## 2022-09-16 ENCOUNTER — HOSPITAL ENCOUNTER (OUTPATIENT)
Dept: WOUND CARE | Age: 82
Discharge: HOME | End: 2022-09-16
Payer: MEDICARE

## 2022-09-16 VITALS
TEMPERATURE: 99.4 F | RESPIRATION RATE: 20 BRPM | SYSTOLIC BLOOD PRESSURE: 134 MMHG | DIASTOLIC BLOOD PRESSURE: 63 MMHG | HEART RATE: 70 BPM

## 2022-09-16 DIAGNOSIS — L97.821 NON-PRESSURE CHRONIC ULCER OF OTHER PART OF LEFT LOWER LEG LIMITED TO BREAKDOWN OF SKIN (HCC): ICD-10-CM

## 2022-09-16 DIAGNOSIS — L97.812 NON-PRESSURE CHRONIC ULCER OF OTHER PART OF RIGHT LOWER LEG WITH FAT LAYER EXPOSED (HCC): ICD-10-CM

## 2022-09-16 DIAGNOSIS — I87.313 CHRONIC VENOUS HYPERTENSION (IDIOPATHIC) WITH ULCER OF BILATERAL LOWER EXTREMITY (CODE) (HCC): Primary | ICD-10-CM

## 2022-09-16 PROCEDURE — 11042 DBRDMT SUBQ TIS 1ST 20SQCM/<: CPT | Performed by: NURSE PRACTITIONER

## 2022-09-16 PROCEDURE — 6370000000 HC RX 637 (ALT 250 FOR IP): Performed by: NURSE PRACTITIONER

## 2022-09-16 PROCEDURE — 11042 DBRDMT SUBQ TIS 1ST 20SQCM/<: CPT

## 2022-09-16 RX ORDER — CLOBETASOL PROPIONATE 0.5 MG/G
OINTMENT TOPICAL ONCE
Status: CANCELLED | OUTPATIENT
Start: 2022-09-16 | End: 2022-09-16

## 2022-09-16 RX ORDER — GENTAMICIN SULFATE 1 MG/G
OINTMENT TOPICAL ONCE
Status: COMPLETED | OUTPATIENT
Start: 2022-09-16 | End: 2022-09-16

## 2022-09-16 RX ORDER — BACITRACIN ZINC AND POLYMYXIN B SULFATE 500; 1000 [USP'U]/G; [USP'U]/G
OINTMENT TOPICAL ONCE
Status: CANCELLED | OUTPATIENT
Start: 2022-09-16 | End: 2022-09-16

## 2022-09-16 RX ORDER — LIDOCAINE 50 MG/G
OINTMENT TOPICAL ONCE
Status: CANCELLED | OUTPATIENT
Start: 2022-09-16 | End: 2022-09-16

## 2022-09-16 RX ORDER — LIDOCAINE HYDROCHLORIDE 20 MG/ML
JELLY TOPICAL ONCE
Status: CANCELLED | OUTPATIENT
Start: 2022-09-16 | End: 2022-09-16

## 2022-09-16 RX ORDER — GINSENG 100 MG
CAPSULE ORAL ONCE
Status: CANCELLED | OUTPATIENT
Start: 2022-09-16 | End: 2022-09-16

## 2022-09-16 RX ORDER — BACITRACIN, NEOMYCIN, POLYMYXIN B 400; 3.5; 5 [USP'U]/G; MG/G; [USP'U]/G
OINTMENT TOPICAL ONCE
Status: CANCELLED | OUTPATIENT
Start: 2022-09-16 | End: 2022-09-16

## 2022-09-16 RX ORDER — LIDOCAINE 40 MG/G
CREAM TOPICAL ONCE
Status: CANCELLED | OUTPATIENT
Start: 2022-09-16 | End: 2022-09-16

## 2022-09-16 RX ORDER — LIDOCAINE HYDROCHLORIDE 40 MG/ML
SOLUTION TOPICAL ONCE
Status: CANCELLED | OUTPATIENT
Start: 2022-09-16 | End: 2022-09-16

## 2022-09-16 RX ORDER — BETAMETHASONE DIPROPIONATE 0.05 %
OINTMENT (GRAM) TOPICAL ONCE
Status: CANCELLED | OUTPATIENT
Start: 2022-09-16 | End: 2022-09-16

## 2022-09-16 RX ORDER — GENTAMICIN SULFATE 1 MG/G
OINTMENT TOPICAL ONCE
Status: CANCELLED | OUTPATIENT
Start: 2022-09-16 | End: 2022-09-16

## 2022-09-16 RX ADMIN — GENTAMICIN SULFATE: 1 OINTMENT TOPICAL at 12:06

## 2022-09-16 ASSESSMENT — PAIN SCALES - GENERAL: PAINLEVEL_OUTOF10: 0

## 2022-09-16 NOTE — PROGRESS NOTES
Multilayer Compression Wrap   (Not Unna) Below the Knee    NAME:  Diona Lanes  YOB: 1940  MEDICAL RECORD NUMBER:  [id number]  DATE:  9/16/2022    Multilayer compression wrap: Removed old Multilayer wrap if indicated and wash leg with mild soap/water. Applied moisturizing agent to dry skin as needed. Applied primary and secondary dressing as ordered. Applied multilayered dressing below the knee to right lower leg. Applied multilayered dressing below the knee to left lower leg. Instructed patient/caregiver not to remove dressing and to keep it clean and dry. Instructed patient/caregiver on complications to report to provider, such as pain, numbness in toes, heavy drainage, and slippage of dressing. Instructed patient on purpose of compression dressing and on activity and exercise recommendations.       Electronically signed by Frankie Negrete RN on 9/16/2022 at 12:05 PM

## 2022-09-16 NOTE — DISCHARGE INSTRUCTIONS
PHYSICIAN ORDERS AND DISCHARGE INSTRUCTIONS     NOTE: Upon discharge from the [address], you will receive a patient experience survey. We would be grateful if you would take the time to fill this survey out. Wound care order history:                 HORTENSIA's   Right  1.36     Left 1.29            Date: 9/2/2022              Cultures:                Grafts:                Antibiotics:           Continuing wound care orders and information:                 Residence:  Private               Continue home health care with: None at this time               Your wound-care supplies will be provided by: Wound cleansing:               Do not scrub or use excessive force. Wash hands with soap and water before and after dressing changes. Prior to applying a clean dressing, cleanse wound with normal saline, wound cleanser, or mild soap and water. Ask the physician or nurse before getting the wound(s) wet in a shower     Daily Wound management:              Keep weight off wounds and reposition every 2 hours. Avoid standing for long periods of time. Apply wraps/stockings in AM and remove at bedtime. If swelling is present, elevate legs to the level of the heart or above for 30 minutes 4-5 times a day and/or when sitting. When taking antibiotics take entire prescription as ordered by physician do not stop taking until medicine is all gone. Orders for this week: 9/16/22                  Venous studies ordered 9/2/22     Rx: [address]     Bilateral Lower Extremities - Wash with soap and water, pat dry. Apply Gentamicin to wound beds. Cover wounds and seeping areas with Large Sorbex. Wrap with Coban 2 Lite. Leave in place for 1 week.      Juxtalites ordered 9/16/22  Heel to knee measurement - 42cm        Follow up with Juliana Grossman CNP in 1 week in the wound care center. Call [phone] for any questions or concerns.   Date__________   Time____________

## 2022-09-16 NOTE — PROGRESS NOTES
Compression [address] for Compression Stockings:     [employer] [id number] [address] [facility] p: [phone] f: [phone]     Ordering Center:     Johnathon Adler   [address] [place] [facility] [id number] [phone]  WOUND CARE Dept: [phone]     [place]  [phone]    Patient Information:      Yuki Calero  [address] [id number]   [phone]   : 1940  AGE: 79 y.o. GENDER: male   TODAYS DATE:  2022    Insurance:      PRIMARY INSURANCE:  Plan: [facility] MEDICARE [employer]  Coverage: [facility] MEDICARE  Effective Date: 2015  Group Number: [unfilled]  Subscriber Number: [id number] - (Medicare Managed)    Payer/Plan Subscr  Sex Relation Sub. Ins. ID Effective Group Num   1.  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)    Relevant Medications    gentamicin (GARAMYCIN) 0.1 % ointment (Start on 2022 11:45 AM)    Other Relevant Orders    Initiate Outpatient Wound Care Protocol    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 ([facility] [address]    Relevant Medications    gentamicin (GARAMYCIN) 0.1 % ointment (Start on 2022 11:45 AM)    Other Relevant Orders    Initiate Outpatient Wound Care Protocol       Wound 10/05/16 Venous ulcer WOUND #1 RT PROXIMAL LEG(ONSET 2 MTHS) (Active)   Number of days: 2171       Wound 10/05/16 Venous ulcer WOUND #2 RT LATERAL LEG CLUSTER (ONSET 2MTHS) (Active)   Number of days: 2171       Wound 10/05/16 Venous ulcer WOUND #3RT DISTAL LEG(ONSET 2 MTHS) (Active)   Number of days: 2171       Wound 10/05/16 Venous ulcer WOUND 4 RT MEDIAL LEG (ONSET 2 MTHS) (Active)   Number of days: 2171       Wound 09/02/22 Tibial Distal;Right;Posterior #2 (Active)   Wound Image   09/02/22 1135   Wound Etiology Venous 09/16/22 1103   Dressing Status New dressing applied 09/09/22 1202   Wound Cleansed Soap and water 09/16/22 1103   Offloading for Diabetic Foot Ulcers Offloading not required 09/16/22 1103   Wound Length (cm) 1 cm 09/16/22 1103   Wound Width (cm) 1 cm 09/16/22 1103   Wound Depth (cm) 0.1 cm 09/16/22 1103   Wound Surface Area (cm^2) 1 cm^2 09/16/22 1103   Change in Wound Size % (l*w) -300 09/16/22 1103   Wound Volume (cm^3) 0.1 cm^3 09/16/22 1103   Wound Healing % -300 09/16/22 1103   Post-Procedure Length (cm) 1 cm 09/16/22 1116   Post-Procedure Width (cm) 1 cm 09/16/22 1116   Post-Procedure Depth (cm) 0.1 cm 09/16/22 1116   Post-Procedure Surface Area (cm^2) 1 cm^2 09/16/22 1116   Post-Procedure Volume (cm^3) 0.1 cm^3 09/16/22 1116   Distance Tunneling (cm) 0 cm 09/16/22 1103   Tunneling Position ___ O'Clock 0 09/16/22 1103   Undermining Starts ___ O'Clock 0 09/16/22 1103   Undermining Ends___ O'Clock 0 09/16/22 1103   Undermining Maxium Distance (cm) 0 09/16/22 1103   Wound Assessment Pink/red;Pale granulation tissue 09/16/22 1103   Drainage Amount Moderate 09/16/22 1116   Drainage Description Green 09/16/22 1103   Odor None 09/16/22 1103   Kiki-wound Assessment Fragile 09/16/22 1103   Margins Defined edges 09/16/22 1103   Wound Thickness Description not for Pressure Injury Full thickness 09/16/22 1103   Number of days: 13       Wound 09/02/22 Pretibial Right;Lateral #3 cluster (Active)   Wound Image   09/02/22 1135   Wound Etiology Venous 09/16/22 1103   Dressing Status New dressing applied 09/09/22 1202   Wound Cleansed Soap and water 09/16/22 1103   Offloading for Diabetic Foot Ulcers Offloading not required 09/16/22 1103   Wound Length (cm) 0.1 cm 09/16/22 1103   Wound Width (cm) 0.1 cm 09/16/22 1103   Wound Depth (cm) 0.1 cm 09/16/22 1103   Wound Surface Area (cm^2) 0.01 cm^2 09/16/22 1103   Change in Wound Size % (l*w) 99.83 09/16/22 1103   Wound Volume (cm^3) 0.001 cm^3 09/16/22 1103   Wound Healing % 100 09/16/22 1103   Post-Procedure Length (cm) 0.1 cm 09/16/22 1116   Post-Procedure Width (cm) 0.1 cm 09/16/22 1116   Post-Procedure Depth (cm) 0.1 cm 09/16/22 1116   Post-Procedure Surface Area (cm^2) 0.01 cm^2 09/16/22 1116   Post-Procedure Volume (cm^3) 0.001 cm^3 09/16/22 1116   Distance Tunneling (cm) 0 cm 09/16/22 1103   Tunneling Position ___ O'Clock 0 09/16/22 1103   Undermining Starts ___ O'Clock 0 09/16/22 1103   Undermining Ends___ O'Clock 0 09/16/22 1103   Undermining Maxium Distance (cm) 0 09/16/22 1103   Wound Assessment Dry;Pink/red 09/16/22 1103   Drainage Amount None 09/16/22 1103   Drainage Description Serous; Yellow 09/09/22 1125   Odor None 09/16/22 1103   Kiki-wound Assessment Fragile; Intact 09/16/22 1103   Margins Attached edges 09/16/22 1103   Wound Thickness Description not for Pressure Injury Full thickness 09/16/22 1103   Number of days: 13       Wound 09/02/22 Pretibial Left;Lateral #4 (Active)   Wound Image   09/02/22 1135   Wound Etiology Venous 09/16/22 1103   Dressing Status New dressing applied 09/09/22 1202   Wound Cleansed Soap and water 09/16/22 1103   Offloading for Diabetic Foot Ulcers Offloading not required 09/16/22 1103   Wound Length (cm) 0.5 cm 09/16/22 1103   Wound Width (cm) 0.8 cm 09/16/22 1103   Wound Depth (cm) 0.1 cm 09/16/22 1103   Wound Surface Area (cm^2) 0.4 cm^2 09/16/22 1103   Change in Wound Size % (l*w) 4.76 09/16/22 1103   Wound Volume (cm^3) 0.04 cm^3 09/16/22 1103   Wound Healing % 5 09/16/22 1103   Post-Procedure Length (cm) 0.5 cm 09/16/22 1116   Post-Procedure Width (cm) 0.8 cm 09/16/22 1116   Post-Procedure Depth (cm) 0.1 cm 09/16/22 1116   Post-Procedure Surface Area (cm^2) 0.4 cm^2 09/16/22 1116   Post-Procedure Volume (cm^3) 0.04 cm^3 09/16/22 1116   Distance Tunneling (cm) 0 cm 09/16/22 1103   Tunneling Position ___ O'Clock 0 09/16/22 1103   Undermining Starts ___ O'Clock 0 09/16/22 1103   Undermining Ends___ O'Clock 0 09/16/22 1103   Undermining Maxium Distance (cm) 0 09/16/22 1103   Wound Assessment [place]/red;Slough 09/16/22 1103   Drainage Amount Moderate 09/16/22 1116   Drainage Description Green 09/16/22 1103   Odor None 09/16/22 1103   Kiki-wound Assessment Fragile 09/16/22 1103   Margins Defined edges 09/16/22 1103   Wound Thickness Description not for Pressure Injury Full thickness 09/16/22 1103   Number of days: 13       Right Leg Measurements: (ALL measurements are in cm)  Right Leg Edema Point of Measurement  Great toe to forefoot: 10 cm  Heel to ankle: 10 cm  Heel to calf: 33  Leg circumference: 35.2 cm  Ankle circumference: 24.4 cm  Foot circumference: 24.5 cm  Compression Therapy: 2 layer compression wrap  Heel to knee measurement - 42cm    Left Leg Measurements: (ALL measurements are in cm)  Left Leg Edema Point of Measurement  Great toe to forefoot: 10 cm  Heel to ankle: 10 cm  Heel to calf: 33  Leg circumference: 37 cm  Ankle circumference: 22.5 cm  Foot circumference: 25 cm  Compression Therapy: 2 layer compression wrap  Heel to knee measurement - 42cm    Supplies Requested :     Medicare Requirements  Patient must have a qualifying Active Venus Ulcer if ordering Bilateral Compression Wounds MUST be present on both legs for Medicare Coverage. The patient can Not be on home health or have had a Medicare part A stay in the past 24 hours.     Patient Wound(s) Debrided: [x] Yes if yes please add date 9/16/22   [] No    Debribement Type: Excisional/Sharp    Patient currently being seen by Home Health: [] Yes   [x] No     Compression Type: Circaid Juxtalite, HD, 30-40 mm/Hg, BILATERAL lower legs     Provider Information:      PROVIDER'S NAME: Jalil Cosme CNP    NPI: [id number]

## 2022-09-23 ENCOUNTER — HOSPITAL ENCOUNTER (OUTPATIENT)
Dept: WOUND CARE | Age: 82
Discharge: HOME | End: 2022-09-23
Payer: MEDICARE

## 2022-09-23 VITALS
SYSTOLIC BLOOD PRESSURE: 146 MMHG | RESPIRATION RATE: 20 BRPM | HEART RATE: 71 BPM | TEMPERATURE: 97.6 F | DIASTOLIC BLOOD PRESSURE: 73 MMHG

## 2022-09-23 DIAGNOSIS — L97.821 NON-PRESSURE CHRONIC ULCER OF OTHER PART OF LEFT LOWER LEG LIMITED TO BREAKDOWN OF SKIN (HCC): ICD-10-CM

## 2022-09-23 DIAGNOSIS — I87.313 CHRONIC VENOUS HYPERTENSION (IDIOPATHIC) WITH ULCER OF BILATERAL LOWER EXTREMITY (CODE) (HCC): Primary | ICD-10-CM

## 2022-09-23 DIAGNOSIS — L97.812 NON-PRESSURE CHRONIC ULCER OF OTHER PART OF RIGHT LOWER LEG WITH FAT LAYER EXPOSED (HCC): ICD-10-CM

## 2022-09-23 PROCEDURE — 11042 DBRDMT SUBQ TIS 1ST 20SQCM/<: CPT

## 2022-09-23 PROCEDURE — 6370000000 HC RX 637 (ALT 250 FOR IP): Performed by: NURSE PRACTITIONER

## 2022-09-23 PROCEDURE — 11042 DBRDMT SUBQ TIS 1ST 20SQCM/<: CPT | Performed by: NURSE PRACTITIONER

## 2022-09-23 RX ORDER — LIDOCAINE 50 MG/G
OINTMENT TOPICAL ONCE
Status: CANCELLED | OUTPATIENT
Start: 2022-09-23 | End: 2022-09-23

## 2022-09-23 RX ORDER — BETAMETHASONE DIPROPIONATE 0.05 %
OINTMENT (GRAM) TOPICAL ONCE
Status: CANCELLED | OUTPATIENT
Start: 2022-09-23 | End: 2022-09-23

## 2022-09-23 RX ORDER — LIDOCAINE 40 MG/G
CREAM TOPICAL ONCE
Status: CANCELLED | OUTPATIENT
Start: 2022-09-23 | End: 2022-09-23

## 2022-09-23 RX ORDER — LIDOCAINE HYDROCHLORIDE 20 MG/ML
JELLY TOPICAL ONCE
Status: CANCELLED | OUTPATIENT
Start: 2022-09-23 | End: 2022-09-23

## 2022-09-23 RX ORDER — GINSENG 100 MG
CAPSULE ORAL ONCE
Status: CANCELLED | OUTPATIENT
Start: 2022-09-23 | End: 2022-09-23

## 2022-09-23 RX ORDER — CLOBETASOL PROPIONATE 0.5 MG/G
OINTMENT TOPICAL ONCE
Status: CANCELLED | OUTPATIENT
Start: 2022-09-23 | End: 2022-09-23

## 2022-09-23 RX ORDER — BACITRACIN ZINC AND POLYMYXIN B SULFATE 500; 1000 [USP'U]/G; [USP'U]/G
OINTMENT TOPICAL ONCE
Status: CANCELLED | OUTPATIENT
Start: 2022-09-23 | End: 2022-09-23

## 2022-09-23 RX ORDER — GENTAMICIN SULFATE 1 MG/G
OINTMENT TOPICAL ONCE
Status: CANCELLED | OUTPATIENT
Start: 2022-09-23 | End: 2022-09-23

## 2022-09-23 RX ORDER — LIDOCAINE HYDROCHLORIDE 40 MG/ML
SOLUTION TOPICAL ONCE
Status: CANCELLED | OUTPATIENT
Start: 2022-09-23 | End: 2022-09-23

## 2022-09-23 RX ORDER — BACITRACIN, NEOMYCIN, POLYMYXIN B 400; 3.5; 5 [USP'U]/G; MG/G; [USP'U]/G
OINTMENT TOPICAL ONCE
Status: CANCELLED | OUTPATIENT
Start: 2022-09-23 | End: 2022-09-23

## 2022-09-23 RX ORDER — GENTAMICIN SULFATE 1 MG/G
OINTMENT TOPICAL ONCE
Status: COMPLETED | OUTPATIENT
Start: 2022-09-23 | End: 2022-09-23

## 2022-09-23 RX ADMIN — GENTAMICIN SULFATE: 1 OINTMENT TOPICAL at 11:53

## 2022-09-23 NOTE — PROGRESS NOTES
Wound Care Center Progress Note With Procedure    Yuriy Rosas  AGE: 79 y.o. GENDER: male  : 1940  EPISODE DATE:  2022     Subjective:     Chief Complaint   Patient presents with    Wound Check     BLE         HISTORY of PRESENT ILLNESS      Yuriy Rosas is a 79 y.o. male who presents to the [address] [place] [address] for a visit for evaluation and treatment of Acute on chronic venous  ulcer(s) of  R lower leg medial, R lower leg lateral, R lower leg anterior, R lower leg posterior, L lower leg medial, L lower leg lateral, L lower leg anterior, L lower leg posterior. The condition is of moderate severity. The ulcer has been present for 2 weeks, but he is beginning to have this as a reoccurring issues. Patient was last seen in this office in March by Dr. Wolfgang Persaud. The underlying cause is thought to be venous insufficiency. The patients care to date has included nothing so far. The patient has significant underlying medical conditions as below.       Patient is essentially healed  Juxta-lites ordered last week but they have not heard anything yet  Would recommend keeping wrapped until delivered     Wound Pain Timing/Severity: waxing and waning  Quality of pain: sharp, burning, throbbing, tender  Severity of pain:   10   Modifying Factors: edema, venous stasis, and obesity  Associated Signs/Symptoms: edema, erythema, drainage, and pain        PAST MEDICAL HISTORY        Diagnosis Date    Aortic regurgitation 2012    4/3/2012-mild; 2011-mild to moderate    Arrhythmia 2011-tachycardia episode during cardiac cath-48 HR Holter monitor normal    Blisters of multiple sites 10/05/2016    CAD (coronary artery disease)     sees Dr Carlie Carr, prior Dr Kirt Gonzalez;    Cardiac pacemaker 2001-[employer] PPM Integrity DDD Model 5342    Congestive heart failure ([facility] [address]     Family history of coronary artery disease     Father-MI    GERD (gastroesophageal reflux disease)     H/O cardiac catheterization 5/2/2012, 2/22/2011, 3/4/2010, 2/2001 5/2/2012-C-No evid of signif CAD; Normal peripheral angiogram; 2/22/2011-No signif CAD, stent patent, had tachycardic event; 3/4/2010-Severe stenosis and re in-stent stenosis of LAD    H/O cardiovascular stress test 4/16/2012, 2/7/2011 4/16/2012-Normal Lexiscan study. No evid of ischemia. Attenuation artifact in inferior region of myocardium. EF 58%. H/O cardiovascular stress test 03/04/2015    normal, no ischemia, EF62%    H/O cardiovascular stress test 12/06/2017    EF60% normal study    H/O Doppler ultrasound 02/07/2011 2/7/2011-CAROTID US-Normal    H/O Doppler ultrasound 05/24/2013    periphal normal    H/O Doppler ultrasound 06/18/2014 6/14 Normal    H/O Doppler ultrasound 11/13/15 09/03/15    11/15 WNL 9/15Arterial and Venous Doppler is normal.     H/O echocardiogram 03/04/2015    EF60% Normal LV and systolic function. Trace MR, Aortic, and TR. No other valvulopathy.      H/O echocardiogram 08/08/2016    EF60% mild AR, mild phtn    H/O echocardiogram 11/01/2017    EF55-60% sclerotic, non stenotic AV, mild AR, phtn    H/O peptic ulcer 1970s    Hiatal hernia     History of cardiac monitoring 06/01/2015    Event - Patient noted to have recurrence of atrial fibrillation with RVR on medical therapy on event monitor    History of Doppler carotid echocardiogram 01/15/2020    No hemodynamically significant stenosis in the internal carotid artery bilaterally, Right internal carotid artery exhibits mild homogenous plaque, Left internal exhibits minimal homogenous plaque, totuosity of the bilateral internal carotid arteries    History of Holter monitoring 05/19/2015    24 hour - predominant rhythm was sinus intermittent paced, paced rhythm can not be ruled out d/t pacemaker function being turned off during device setup, limited pacemaker data available    History of nuclear stress test 11/10/2016    lexiscan-normal,EF70%    HX OTHER MEDICAL 05/02/2012 5/2/2012-PERIPHERAL ANGIOGRAM-Normal-Dr Molina    HX OTHER MEDICAL 03/02/2011    3/2/2011-48 HR HOLTER MONITOR-Pred Rhythm is sinus rhythm. No arrhythmias. Hyperlipidemia     Hypertension     Nuclear Stress 07/07/2021    EF 53%.     S/P angioplasty with stent 3/4/2010, 2/2001    3/4/2010-PTCA with 2.75 X 38 mm stent to LAD; 2/2001-PTCA with stent to LAD    S/P cardiac pacemaker procedure 06/04/2013    battery replacement    Sleep apnea     has CPAP    WD-Chronic venous hypertension (idiopathic) with ulcer of bilateral lower extremity (CODE) ([facility] [address] 2/18/2022    WD-Non-pressure chronic ulcer of other part of left lower leg limited to breakdown of skin ([address] 2/18/2022    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 ([address] 2/18/2022       PAST SURGICAL HISTORY    Past Surgical History:   Procedure Laterality Date    ATRIAL ABLATION SURGERY  8/11/15    Atrial fib/flutter ablation     CARDIAC CATHETERIZATION  06/14/2015    no significant disease    CARDIAC PACEMAKER PLACEMENT  4/2001 4/2001-[employer] PPM Integrity DDD Model 5342    CARPAL TUNNEL RELEASE Bilateral 1976    CORONARY ANGIOPLASTY WITH STENT PLACEMENT  3/4/2010, 2/2001    3/4/2010-PTCA with 2.75 X 38 mm stent to LAD; 2/2001-PTCA with stent to LAD;    DIAGNOSTIC CARDIAC CATH LAB PROCEDURE  5/2/2012,2/22/2011, 3/4/2010, 2/2001 5/2/2012-[facility]-No evid of signif CAD; Normal peripheral angiogram; 2/22/2011-No signif CAD, stent patent, had tachycardic event; 3/4/2010-Severe stenosis and re in-stent stenosis of LAD    FINGER TRIGGER RELEASE Left 1994    thumb    HAND TENDON SURGERY Left 1976    Left middle finger    HERNIA REPAIR Bilateral 1993    bilateral inguinal hernias    JOINT REPLACEMENT      knees bilaterally-Right 10/2009; 1/2008-Left    KNEE ARTHROSCOPY  11/2005, 1989 11/2005, 1989-Right knee    LUMBAR DISC ARTHROPLASTY  07/08/2016    OTHER SURGICAL HISTORY  5/2/2012 5/2/2012-PERIPHERAL ANGIOGRAM-Normal UPPER GASTROINTESTINAL ENDOSCOPY  5/3/2001    5/3/9534-[facility])       FAMILY HISTORY    Family History   Problem Relation Age of Onset    Heart Disease Mother     High Blood Pressure Mother     Stroke Father     Coronary Art Dis Father         MI    Other Brother         accidental    Brain Cancer Paternal Grandfather        SOCIAL HISTORY    Social History     Tobacco Use    Smoking status: Never    Smokeless tobacco: Never   Vaping Use    Vaping Use: Never used   Substance Use Topics    Alcohol use: No     Alcohol/week: 0.0 standard drinks    Drug use: No       ALLERGIES    Allergies   Allergen Reactions    Ceclor [Cefaclor] Rash    Celebrex [Celecoxib] Rash    Pcn [Penicillins] Rash    Relafen [Nabumetone] Rash       MEDICATIONS    Current Outpatient Medications on File Prior to Encounter   Medication Sig Dispense Refill    torsemide (DEMADEX) 20 MG tablet Take 1 tablet by mouth in the morning and at bedtime 60 tablet 3    amLODIPine (NORVASC) 5 MG tablet       TRAZODONE HCL PO Take by mouth      DULoxetine (CYMBALTA) 30 MG extended release capsule Take 30 mg by mouth daily      ferrous sulfate (IRON 325) 325 (65 Fe) MG tablet Take 325 mg by mouth daily      potassium chloride (KLOR-CON M) 20 MEQ extended release tablet Take 1 tablet by mouth daily      diclofenac-miSOPROStol (ARTHROTEC 75) 75-0.2 MG per tablet Take 1 tablet by mouth 2 times daily      tiZANidine (ZANAFLEX) 4 MG tablet TAKE 1/2 TABLET BY MOUTH THREE TIMES A DAY FOR 2 DAYS, THEN TAKE 1 TABLET BY MOUTH THREE TIMES A DAY AFTER  2    losartan (COZAAR) 50 MG tablet Take 50 mg by mouth daily       dexlansoprazole (DEXILANT) 60 MG CPDR delayed release capsule Take 60 mg by mouth daily      zolpidem (AMBIEN) 10 MG tablet Take by mouth nightly as needed for Sleep      dronedarone hcl (MULTAQ) 400 MG TABS Take 400 mg by mouth 2 times daily (with meals)      nitroGLYCERIN (NITROLINGUAL) 0.4 MG/SPRAY 0.4 mg spray Place 1 spray under the tongue every 5 minutes as needed for Chest pain      CPAP Machine MISC by Does not apply route nightly      apixaban (ELIQUIS) 5 MG TABS tablet Take 1 tablet by mouth 2 times daily 60 tablet 3    rosuvastatin (CRESTOR) 20 MG tablet Take 20 mg by mouth daily. pramipexole (MIRAPEX) 1 MG tablet   Take 0.5 mg by mouth 2 times daily Patient takes 1/2 tablet in the am and 1 1/2 tablet at night. metoprolol (LOPRESSOR) 50 MG tablet Take 50 mg by mouth 2 times daily. modafinil (PROVIGIL) 100 MG tablet Take 100 mg by mouth daily. esomeprazole (NEXIUM) 40 MG capsule Take 40 mg by mouth 2 times daily. aspirin 81 MG chewable tablet Take 81 mg by mouth daily. No current facility-administered medications on file prior to encounter. REVIEW OF SYSTEMS    Pertinent items are noted in HPI. Constitutional: Negative for systemic symptoms including fever, chills and malaise. Objective:      BP (!) 146/73   Pulse 71   Temp 97.6 °F (36.4 °C) (Temporal)   Resp 20     PHYSICAL EXAM      General: The patient is in no acute distress. Mental status:  Patient is appropriate, is  oriented to place and plan of care.   Dermatologic exam: Visual inspection of the periwound reveals the skin to be normal in turgor and texture, dry, and slack  Wound exam: see wound description below in procedure note      Assessment:     Problem List Items Addressed This Visit          Circulatory    WD-Chronic venous hypertension (idiopathic) with ulcer of bilateral lower extremity (CODE) ([facility] [address] - Primary    Relevant Orders    Initiate Outpatient Wound Care Protocol       Other    WD-Non-pressure chronic ulcer of other part of left lower leg limited to breakdown of skin ([address]    Relevant Orders    Initiate Outpatient Wound Care Protocol    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 Performed by: MALLORY Browne CNP    Consent obtained: Yes    Time out taken:  Yes    Pain Control: N/A      Debridement:Excisional Debridement    Using curette the wound(s) was/were sharply debrided down through and including the removal of subcutaneous tissue.         Devitalized Tissue Debrided:  fibrin, biofilm, slough, necrotic/eschar, and exudate    Pre Debridement Measurements:  Are located in the Wound Documentation Flow Sheet    All active wounds listed below with today's date are evaluated  Wound(s)    debrided this date include # : 2     Post  Debridement Measurements:  Wound 10/05/16 Venous ulcer WOUND #1 RT PROXIMAL LEG(ONSET 2 MTHS) (Active)   Number of days: 2178       Wound 10/05/16 Venous ulcer WOUND #2 RT LATERAL LEG CLUSTER (ONSET 2MTHS) (Active)   Number of days: 2178       Wound 10/05/16 Venous ulcer WOUND #3RT DISTAL LEG(ONSET 2 MTHS) (Active)   Number of days: 2178       Wound 10/05/16 Venous ulcer WOUND 4 RT MEDIAL LEG (ONSET 2 MTHS) (Active)   Number of days: 2178       Wound 09/02/22 Tibial Distal;Right;Posterior #2 (Active)   Wound Image   09/23/22 1057   Wound Etiology Venous 09/23/22 1057   Dressing Status New dressing applied 09/09/22 1202   Wound Cleansed Soap and water 09/23/22 1057   Offloading for Diabetic Foot Ulcers Offloading not required 09/23/22 1057   Wound Length (cm) 0 cm 09/23/22 1057   Wound Width (cm) 0 cm 09/23/22 1057   Wound Depth (cm) 0 cm 09/23/22 1057   Wound Surface Area (cm^2) 0 cm^2 09/23/22 1057   Change in Wound Size % (l*w) 100 09/23/22 1057   Wound Volume (cm^3) 0 cm^3 09/23/22 1057   Wound Healing % 100 09/23/22 1057   Post-Procedure Length (cm) 1 cm 09/16/22 1116   Post-Procedure Width (cm) 1 cm 09/16/22 1116   Post-Procedure Depth (cm) 0.1 cm 09/16/22 1116   Post-Procedure Surface Area (cm^2) 1 cm^2 09/16/22 1116   Post-Procedure Volume (cm^3) 0.1 cm^3 09/16/22 1116   Distance Tunneling (cm) 0 cm 09/23/22 1057   Tunneling Position ___ O'Clock 0 09/23/22 1057   Undermining Starts ___ O'Clock 0 09/23/22 1057   Undermining Ends___ O'Clock 0 09/23/22 1057   Undermining Maxium Distance (cm) 0 09/23/22 1057   Wound Assessment Pink/red 09/23/22 1057   Drainage Amount None 09/23/22 1057   Drainage Description Green 09/16/22 1103   Odor None 09/23/22 1057   Kiki-wound Assessment Blanchable erythema;Fragile 09/23/22 1057   Margins Defined edges 09/23/22 1057   Wound Thickness Description not for Pressure Injury Full thickness 09/23/22 1057   Number of days: 20       Wound 09/02/22 Pretibial Right;Lateral #3 cluster (Active)   Wound Image   09/23/22 1057   Wound Etiology Venous 09/23/22 1057   Dressing Status New dressing applied 09/09/22 1202   Wound Cleansed Soap and water 09/23/22 1057   Offloading for Diabetic Foot Ulcers Offloading not required 09/23/22 1057   Wound Length (cm) 0 cm 09/23/22 1057   Wound Width (cm) 0 cm 09/23/22 1057   Wound Depth (cm) 0 cm 09/23/22 1057   Wound Surface Area (cm^2) 0 cm^2 09/23/22 1057   Change in Wound Size % (l*w) 100 09/23/22 1057   Wound Volume (cm^3) 0 cm^3 09/23/22 1057   Wound Healing % 100 09/23/22 1057   Post-Procedure Length (cm) 0.1 cm 09/16/22 1116   Post-Procedure Width (cm) 0.1 cm 09/16/22 1116   Post-Procedure Depth (cm) 0.1 cm 09/16/22 1116   Post-Procedure Surface Area (cm^2) 0.01 cm^2 09/16/22 1116   Post-Procedure Volume (cm^3) 0.001 cm^3 09/16/22 1116   Distance Tunneling (cm) 0 cm 09/23/22 1057   Tunneling Position ___ O'Clock 0 09/23/22 1057   Undermining Starts ___ O'Clock 0 09/23/22 1057   Undermining Ends___ O'Clock 0 09/23/22 1057   Undermining Maxium Distance (cm) 0 09/23/22 1057   Wound Assessment Pink/red 09/23/22 1057   Drainage Amount None 09/23/22 1057   Drainage Description Serous; Yellow 09/09/22 1125   Odor None 09/23/22 1057   Kiki-wound Assessment Blanchable erythema;Fragile 09/23/22 1057   Margins Attached edges 09/23/22 1057   Wound Thickness Description not for Pressure Injury Full thickness 09/23/22 1057   Number of days: 20       Wound 09/02/22 Pretibial Left;Lateral #4 (Active)   Wound Image   09/23/22 1057   Wound Etiology Venous 09/23/22 1057   Dressing Status New dressing applied 09/09/22 1202   Wound Cleansed Soap and water 09/23/22 1057   Offloading for Diabetic Foot Ulcers Offloading not required 09/23/22 1057   Wound Length (cm) 0.5 cm 09/23/22 1057   Wound Width (cm) 0.5 cm 09/23/22 1057   Wound Depth (cm) 0.1 cm 09/23/22 1057   Wound Surface Area (cm^2) 0.25 cm^2 09/23/22 1057   Change in Wound Size % (l*w) 40.48 09/23/22 1057   Wound Volume (cm^3) 0.025 cm^3 09/23/22 1057   Wound Healing % 40 09/23/22 1057   Post-Procedure Length (cm) 0.5 cm 09/16/22 1116   Post-Procedure Width (cm) 0.8 cm 09/16/22 1116   Post-Procedure Depth (cm) 0.1 cm 09/16/22 1116   Post-Procedure Surface Area (cm^2) 0.4 cm^2 09/16/22 1116   Post-Procedure Volume (cm^3) 0.04 cm^3 09/16/22 1116   Distance Tunneling (cm) 0 cm 09/23/22 1057   Tunneling Position ___ O'Clock 0 09/23/22 1057   Undermining Starts ___ O'Clock 0 09/23/22 1057   Undermining Ends___ O'Clock 0 09/23/22 1057   Undermining Maxium Distance (cm) 0 09/23/22 1057   Wound Assessment [place]/red;Slough 09/23/22 1057   Drainage Amount Scant 09/23/22 1057   Drainage Description Yellow 09/23/22 1057   Odor None 09/23/22 1057   Kiki-wound Assessment Fragile;Blanchable erythema 09/23/22 1057   Margins Defined edges 09/23/22 1057   Wound Thickness Description not for Pressure Injury Full thickness 09/23/22 1057   Number of days: 20       Percent of Wound(s) Debrided: approximately 100%    Total  Area  Debrided:  0.25 sq cm     Bleeding:  Minimal    Hemostasis Achieved:  by pressure    Procedural Pain:  0  / 10     Post Procedural Pain:  0 / 10 Response to treatment:  Well tolerated by patient. Status of wound progress and description from last visit:   Patient agreeable to wrap until juxta-lites arrive. Follow up 1 week and we will check with rep  Continue plan of care for now   Will be ready for discharge whenever they arrive       Plan:       Discharge Instructions         [address]     NOTE: Upon discharge from the [address], you will receive a patient experience survey. We would be grateful if you would take the time to fill this survey out. Wound care order history:                 HORTENSIA's   Right  1.36     Left 1.29            Date: 9/2/2022              Cultures:                Grafts:                Antibiotics:           Continuing wound care orders and information:                 Residence:  Private               Continue home health care with: None at this time               Your wound-care supplies will be provided by: Wound cleansing:               Do not scrub or use excessive force. Wash hands with soap and water before and after dressing changes. Prior to applying a clean dressing, cleanse wound with normal saline, wound cleanser, or mild soap and water. Ask the physician or nurse before getting the wound(s) wet in a shower     Daily Wound management:              Keep weight off wounds and reposition every 2 hours. Avoid standing for long periods of time. Apply wraps/stockings in AM and remove at bedtime. If swelling is present, elevate legs to the level of the heart or above for 30 minutes 4-5 times a day and/or when sitting. When taking antibiotics take entire prescription as ordered by physician do not stop taking until medicine is all gone.                                                            Orders for this week: 9/23/22                  Venous studies ordered

## 2022-09-23 NOTE — DISCHARGE INSTRUCTIONS
PHYSICIAN ORDERS AND DISCHARGE INSTRUCTIONS     NOTE: Upon discharge from the [address], you will receive a patient experience survey. We would be grateful if you would take the time to fill this survey out. Wound care order history:                 HORTENSIA's   Right  1.36     Left 1.29            Date: 9/2/2022              Cultures:                Grafts:                Antibiotics:           Continuing wound care orders and information:                 Residence:  Private               Continue home health care with: None at this time               Your wound-care supplies will be provided by: Wound cleansing:               Do not scrub or use excessive force. Wash hands with soap and water before and after dressing changes. Prior to applying a clean dressing, cleanse wound with normal saline, wound cleanser, or mild soap and water. Ask the physician or nurse before getting the wound(s) wet in a shower     Daily Wound management:              Keep weight off wounds and reposition every 2 hours. Avoid standing for long periods of time. Apply wraps/stockings in AM and remove at bedtime. If swelling is present, elevate legs to the level of the heart or above for 30 minutes 4-5 times a day and/or when sitting. When taking antibiotics take entire prescription as ordered by physician do not stop taking until medicine is all gone. Orders for this week: 9/23/22                  Venous studies ordered 9/2/22     Rx: [address]     Bilateral Lower Extremities - Wash with soap and water, pat dry. Apply Gentamicin to any open wound beds. Cover wounds and seeping areas with Large Sorbex. Wrap with Coban 2 Lite. Leave in place for 1 week.      Juxtalites ordered 9/16/22  Heel to knee measurement - 42cm        Follow up with Gina Nina CNP in 1 week in the wound care center. Call [phone] for any questions or concerns.   Date__________   Time____________

## 2022-09-23 NOTE — PROGRESS NOTES
Multilayer Compression Wrap   (Not Unna) Below the Knee    NAME:  Siomara Chavez  YOB: 1940  MEDICAL RECORD NUMBER:  [id number]  DATE:  9/23/2022    Multilayer compression wrap: Removed old Multilayer wrap if indicated and wash leg with mild soap/water. Applied moisturizing agent to dry skin as needed. Applied primary and secondary dressing as ordered. Applied multilayered dressing below the knee to right lower leg. Applied multilayered dressing below the knee to left lower leg. Instructed patient/caregiver not to remove dressing and to keep it clean and dry. Instructed patient/caregiver on complications to report to provider, such as pain, numbness in toes, heavy drainage, and slippage of dressing. Instructed patient on purpose of compression dressing and on activity and exercise recommendations.       Electronically signed by No Walker LPN on 0/15/9523 at 85:85 AM

## 2022-10-03 ENCOUNTER — OFFICE VISIT (OUTPATIENT)
Dept: CARDIOLOGY CLINIC | Age: 82
End: 2022-10-03
Payer: MEDICARE

## 2022-10-03 VITALS
SYSTOLIC BLOOD PRESSURE: 134 MMHG | HEART RATE: 68 BPM | HEIGHT: 64 IN | DIASTOLIC BLOOD PRESSURE: 78 MMHG | WEIGHT: 220.4 LBS | BODY MASS INDEX: 37.63 KG/M2

## 2022-10-03 DIAGNOSIS — I73.9 CLAUDICATION (HCC): ICD-10-CM

## 2022-10-03 DIAGNOSIS — I48.0 PAF (PAROXYSMAL ATRIAL FIBRILLATION) (HCC): Primary | ICD-10-CM

## 2022-10-03 DIAGNOSIS — M79.89 SWELLING OF EXTREMITY: ICD-10-CM

## 2022-10-03 PROCEDURE — 99214 OFFICE O/P EST MOD 30 MIN: CPT | Performed by: INTERNAL MEDICINE

## 2022-10-03 PROCEDURE — 93000 ELECTROCARDIOGRAM COMPLETE: CPT | Performed by: INTERNAL MEDICINE

## 2022-10-03 PROCEDURE — 1123F ACP DISCUSS/DSCN MKR DOCD: CPT | Performed by: INTERNAL MEDICINE

## 2022-10-03 NOTE — PROGRESS NOTES
CARDIOLOGY NOTE      10/3/2022    RE: Lior Altman  (1940)                               TO:  Dr. Nan Hartman MD            Holly Fernández is a 79 y.o. male who was seen today for management of coronary artery disease                                    HPI:                   Pt has h/o coronary artery disease, hypertension, hyperlipidemia, permanent pacemaker, atrial fibrillation, seen today for follow-up.  FU on echo, pt still SOB    Lior Altman has the following history recorded in care path:  Patient Active Problem List    Diagnosis Date Noted    Hypersomnia 08/12/2022    ILD (interstitial lung disease) ([address] 08/12/2022    Syncope and collapse 10/24/2017    Blisters of multiple sites 10/05/2016    Hematuria     Abdominal discomfort     S/P ablation of atrial fibrillation     S/P ablation of atrial flutter     Atrial fibrillation (HCC)     History of cardiac monitoring 06/01/2015    PAF (paroxysmal atrial fibrillation) ([facility])     HTN (hypertension)     Tachycardia     Congestive heart failure ([address]     Pacemaker     CAD (coronary artery disease) 02/23/2015    Swelling of calf 02/18/2014    Pacer at end of battery life 05/29/2013    SSS (sick sinus syndrome) ([address] 05/29/2013    Claudication ([address] 05/24/2013    History of PTCA     Hyperlipidemia     Hypertension     Sleep apnea     WD-Chronic venous hypertension (idiopathic) with ulcer of bilateral lower extremity (CODE) ([address] 02/18/2022    WD-Non-pressure chronic ulcer of other part of left lower leg limited to breakdown of skin ([address] 02/18/2022    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 ([address] 02/18/2022    Venous stasis 02/19/2020    Pulmonary hypertension ([address] 02/11/2019    JAIMIE treated with BiPAP 12/03/2018    Obesity (BMI 35.0-39.9 without comorbidity) 12/03/2018    Excessive daytime sleepiness 12/03/2018    SOB (shortness of breath) on exertion 12/03/2018     Current Outpatient Medications   Medication Sig Dispense Refill    torsemide (DEMADEX) 20 MG tablet Take 1 tablet by mouth in the morning and at bedtime 60 tablet 3    amLODIPine (NORVASC) 5 MG tablet       TRAZODONE HCL PO Take by mouth      DULoxetine (CYMBALTA) 30 MG extended release capsule Take 30 mg by mouth daily      ferrous sulfate (IRON 325) 325 (65 Fe) MG tablet Take 325 mg by mouth daily      potassium chloride (KLOR-CON M) 20 MEQ extended release tablet Take 1 tablet by mouth daily      diclofenac-miSOPROStol (ARTHROTEC 75) 75-0.2 MG per tablet Take 1 tablet by mouth 2 times daily      tiZANidine (ZANAFLEX) 4 MG tablet TAKE 1/2 TABLET BY MOUTH THREE TIMES A DAY FOR 2 DAYS, THEN TAKE 1 TABLET BY MOUTH THREE TIMES A DAY AFTER  2    losartan (COZAAR) 50 MG tablet Take 50 mg by mouth daily       dexlansoprazole (DEXILANT) 60 MG CPDR delayed release capsule Take 60 mg by mouth daily      zolpidem (AMBIEN) 10 MG tablet Take by mouth nightly as needed for Sleep      dronedarone hcl (MULTAQ) 400 MG TABS Take 400 mg by mouth 2 times daily (with meals)      nitroGLYCERIN (NITROLINGUAL) 0.4 MG/SPRAY 0.4 mg spray Place 1 spray under the tongue every 5 minutes as needed for Chest pain      CPAP Machine MISC by Does not apply route nightly      apixaban (ELIQUIS) 5 MG TABS tablet Take 1 tablet by mouth 2 times daily 60 tablet 3    rosuvastatin (CRESTOR) 20 MG tablet Take 20 mg by mouth daily. pramipexole (MIRAPEX) 1 MG tablet   Take 0.5 mg by mouth 2 times daily Patient takes 1/2 tablet in the am and 1 1/2 tablet at night. metoprolol (LOPRESSOR) 50 MG tablet Take 50 mg by mouth 2 times daily. modafinil (PROVIGIL) 100 MG tablet Take 100 mg by mouth daily. esomeprazole (NEXIUM) 40 MG capsule Take 40 mg by mouth 2 times daily. aspirin 81 MG chewable tablet Take 81 mg by mouth daily. No current facility-administered medications for this visit.      Allergies: Ceclor [cefaclor], Celebrex [celecoxib], Pcn [penicillins], and Relafen [nabumetone]  Past Medical History:   Diagnosis Date    Aortic regurgitation 04/03/2012    4/3/2012-mild; 2/7/2011-mild to moderate    Arrhythmia 02/22/2011 2/22/2011-tachycardia episode during cardiac cath-48 HR Holter monitor normal    Blisters of multiple sites 10/05/2016    CAD (coronary artery disease)     sees Dr Ava Kimbrough, prior Dr Branden Barahona;    Cardiac pacemaker 04/2001 4/2001-[employer] PPM Integrity DDD Model 5342    Congestive heart failure [facility])     Family history of coronary artery disease     Father-MI    GERD (gastroesophageal reflux disease)     H/O cardiac catheterization 5/2/2012, 2/22/2011, 3/4/2010, 2/2001 5/2/2012-[facility]-No evid of signif CAD; Normal peripheral angiogram; 2/22/2011-No signif CAD, stent patent, had tachycardic event; 3/4/2010-Severe stenosis and re in-stent stenosis of LAD    H/O cardiovascular stress test 4/16/2012, 2/7/2011 4/16/2012-Normal Lexiscan study. No evid of ischemia. Attenuation artifact in inferior region of myocardium. EF 58%. H/O cardiovascular stress test 03/04/2015    normal, no ischemia, EF62%    H/O cardiovascular stress test 12/06/2017    EF60% normal study    H/O Doppler ultrasound 02/07/2011 2/7/2011-CAROTID US-Normal    H/O Doppler ultrasound 05/24/2013    periphal normal    H/O Doppler ultrasound 06/18/2014 6/14 Normal    H/O Doppler ultrasound 11/13/15 09/03/15    11/15 WNL 9/15Arterial and Venous Doppler is normal.     H/O echocardiogram 03/04/2015    EF60% Normal LV and systolic function. Trace MR, Aortic, and TR. No other valvulopathy.      H/O echocardiogram 08/08/2016    EF60% mild AR, mild phtn    H/O echocardiogram 11/01/2017    EF55-60% sclerotic, non stenotic AV, mild AR, phtn    H/O peptic ulcer 1970s    Hiatal hernia     History of cardiac monitoring 06/01/2015    Event - Patient noted to have recurrence of atrial fibrillation with RVR on medical therapy on event monitor    History of Doppler carotid echocardiogram 01/15/2020    No hemodynamically significant stenosis in the internal carotid artery bilaterally, Right internal carotid artery exhibits mild homogenous plaque, Left internal exhibits minimal homogenous plaque, totuosity of the bilateral internal carotid arteries    History of Holter monitoring 05/19/2015    24 hour - predominant rhythm was sinus intermittent paced, paced rhythm can not be ruled out d/t pacemaker function being turned off during device setup, limited pacemaker data available    History of nuclear stress test 11/10/2016    lexiscan-normal,EF70%    HX OTHER MEDICAL 05/02/2012 5/2/2012-PERIPHERAL ANGIOGRAM-Normal-Dr Molina    HX OTHER MEDICAL 03/02/2011    3/2/2011-48 HR HOLTER MONITOR-Pred Rhythm is sinus rhythm. No arrhythmias. Hyperlipidemia     Hypertension     Nuclear Stress 07/07/2021    EF 53%.     S/P angioplasty with stent 3/4/2010, 2/2001    3/4/2010-PTCA with 2.75 X 38 mm stent to LAD; 2/2001-PTCA with stent to LAD    S/P cardiac pacemaker procedure 06/04/2013    battery replacement    Sleep apnea     has CPAP    WD-Chronic venous hypertension (idiopathic) with ulcer of bilateral lower extremity (CODE) ([address] 2/18/2022    WD-Non-pressure chronic ulcer of other part of left lower leg limited to breakdown of skin ([address] 2/18/2022    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 ([address] 2/18/2022     Past Surgical History:   Procedure Laterality Date    ATRIAL ABLATION SURGERY  8/11/15    Atrial fib/flutter ablation     CARDIAC CATHETERIZATION  06/14/2015    no significant disease    CARDIAC PACEMAKER PLACEMENT  4/2001 4/2001-[employer] PPM Integrity DDD Model 5342    CARPAL TUNNEL RELEASE Bilateral 1976    CORONARY ANGIOPLASTY WITH STENT PLACEMENT  3/4/2010, 2/2001    3/4/2010-PTCA with 2.75 X 38 mm stent to LAD; 2/2001-PTCA with stent to LAD;    DIAGNOSTIC CARDIAC CATH LAB PROCEDURE  5/2/2012,2/22/2011, 3/4/2010, 2/2001 5/2/2012-[facility]-No evid of signif CAD; Normal peripheral angiogram; 2/22/2011-No signif CAD, stent patent, had tachycardic event; 3/4/2010-Severe stenosis and re in-stent stenosis of LAD    FINGER TRIGGER RELEASE Left 1994    thumb    HAND TENDON SURGERY Left 1976    Left middle finger    HERNIA REPAIR Bilateral 1993    bilateral inguinal hernias    JOINT REPLACEMENT      knees bilaterally-Right 10/2009; 1/2008-Left    KNEE ARTHROSCOPY  11/2005, 1989 11/2005, 1989-Right knee    LUMBAR DISC ARTHROPLASTY  07/08/2016    OTHER SURGICAL HISTORY  5/2/2012 5/2/2012-PERIPHERAL ANGIOGRAM-Normal    UPPER GASTROINTESTINAL ENDOSCOPY  5/3/2001    5/3/0561-[facility])      As reviewed   Family History   Problem Relation Age of Onset    Heart Disease Mother     High Blood Pressure Mother     Stroke Father     Coronary Art Dis Father         MI    Other Brother         accidental    Brain Cancer Paternal Grandfather      Social History     Tobacco Use    Smoking status: Never    Smokeless tobacco: Never   Substance Use Topics    Alcohol use: No     Alcohol/week: 0.0 standard drinks        Objective:    Vitals:    10/03/22 1455   BP: 134/78   Pulse: 68   Weight: 220 lb 6.4 oz (100 kg)   Height: 5' 4\" (1.626 m)     /78   Pulse 68   Ht 5' 4\" (1.626 m)   Wt 220 lb 6.4 oz (100 kg)   BMI 37.83 kg/m²     No flowsheet data found. Wt Readings from Last 3 Encounters:   10/03/22 220 lb 6.4 oz (100 kg)   08/16/22 223 lb 6.4 oz (101.3 kg)   08/12/22 224 lb (101.6 kg)     Body mass index is 37.83 kg/m². GENERAL - Alert, oriented, pleasant, in no apparent distress. EYES: No jaundice, no conjunctival pallor. SKIN: It is warm & dry. No rashes. No Echhymosis    HEENT - No clinically significant abnormalities seen. Neck - Supple. No jugular venous distention noted. No carotid bruits. Cardiovascular - Normal S1 and S2 without obvious murmur or gallop. Extremities - No cyanosis, clubbing, or significant edema.     Pulmonary - No respiratory distress. No wheezes or rales. Abdomen - No masses, tenderness, or organomegaly. Musculoskeletal - No significant edema. No joint deformities. No muscle wasting. Neurologic - Cranial nerves II through XII are grossly intact. There were no gross focal neurologic abnormalities. Lab Review   Lab Results   Component Value Date/Time    CKTOTAL 297 05/01/2012 04:45 AM    CKMB 7.2 05/01/2012 04:45 AM    TROPONINT 0.017 06/11/2015 12:13 PM     BNP:    Lab Results   Component Value Date/Time    BNP 43 12/22/2011 11:00 AM     PT/INR:    Lab Results   Component Value Date    INR 0.92 01/04/2016     No results found for: LABA1C  Lab Results   Component Value Date    WBC 8.7 11/24/2019    HCT 33.6 (L) 11/24/2019    MCV 90.1 11/24/2019     11/24/2019     Lab Results   Component Value Date    CHOL 121 03/24/2017    TRIG 72 03/24/2017    HDL 48 03/24/2017    LDLDIRECT 67 03/24/2017     Lab Results   Component Value Date    ALT 20 11/24/2019    AST 22 11/24/2019     BMP:    Lab Results   Component Value Date/Time     09/06/2022 01:41 PM    K 3.9 09/06/2022 01:41 PM     09/06/2022 01:41 PM    CO2 30 09/06/2022 01:41 PM    BUN 27 09/06/2022 01:41 PM    CREATININE 1.5 09/06/2022 01:41 PM     CMP:   Lab Results   Component Value Date/Time     09/06/2022 01:41 PM    K 3.9 09/06/2022 01:41 PM     09/06/2022 01:41 PM    CO2 30 09/06/2022 01:41 PM    BUN 27 09/06/2022 01:41 PM    PROT 6.6 11/24/2019 01:41 PM    PROT 7.2 05/01/2012 01:40 AM     TSH:    Lab Results   Component Value Date/Time    TSHHS 0.925 03/24/2017 10:27 AM           Assessment & Plan:               -     CORONARY ARTERY DISEASE:  symptomatic     All available  tests in chart reviewed. Management discussed .   Testing ordered  no          On aspirin      Cardiolite was unremarkable last July       LHC and RHC  offered , pt will DW pulmonary                -  Hypertension: Patients blood pressure is normal. Patient is advised about low sodium diet. Present medical regimen will not be changed. On Norvasc 5 mg p.o. daily losartan 50 mg p.o. daily Lopressor 50 mg p.o. twice daily         - PPM; on carelink           -  LIPID MANAGEMENT:  Importance of lipid levels discussed with patient   and patient was given dietary advice. NCEP- ATP III guidelines reviewed with patient. -   Ch on Crestor 20 mg p.o. daily anges  in medicines made: No           -Patient also complains of swelling in the lower extremities along with pain as well has been going to the wound clinic was asked to get his circulation checked  venous Doppler for reflux , also arterial Doppler                     - Atrial fibrillation, pt is  compliant with meds. Patient does not have symptoms from atrial fibrillation  On Eliquis 5 mg p.o. daily    -Obstructive sleep apnea on CPAP compliant, echo for PAP  Has been having increasing shortness of breath hence will obtain echocardiogram for evaluation of his pulmonary artery pressure and go from there  Patient also sees Pulmonal pulmonary medicine    Mortality from the morbid obesity is very high:  Patient's BMI is 38.9 which is very high he was also advised to work on his weight     Body mass index is 37.83 kg/m².         Jessica Hay MD    [facility]

## 2022-10-07 ENCOUNTER — HOSPITAL ENCOUNTER (OUTPATIENT)
Dept: WOUND CARE | Age: 82
Discharge: HOME | End: 2022-10-07
Payer: MEDICARE

## 2022-10-07 VITALS
DIASTOLIC BLOOD PRESSURE: 77 MMHG | SYSTOLIC BLOOD PRESSURE: 161 MMHG | RESPIRATION RATE: 20 BRPM | HEART RATE: 86 BPM | TEMPERATURE: 98 F

## 2022-10-07 DIAGNOSIS — I87.313 CHRONIC VENOUS HYPERTENSION (IDIOPATHIC) WITH ULCER OF BILATERAL LOWER EXTREMITY (CODE) (HCC): Primary | ICD-10-CM

## 2022-10-07 DIAGNOSIS — L97.821 NON-PRESSURE CHRONIC ULCER OF OTHER PART OF LEFT LOWER LEG LIMITED TO BREAKDOWN OF SKIN (HCC): ICD-10-CM

## 2022-10-07 DIAGNOSIS — L97.812 NON-PRESSURE CHRONIC ULCER OF OTHER PART OF RIGHT LOWER LEG WITH FAT LAYER EXPOSED (HCC): ICD-10-CM

## 2022-10-07 PROCEDURE — 99213 OFFICE O/P EST LOW 20 MIN: CPT | Performed by: NURSE PRACTITIONER

## 2022-10-07 PROCEDURE — 99212 OFFICE O/P EST SF 10 MIN: CPT

## 2022-10-07 RX ORDER — LIDOCAINE HYDROCHLORIDE 20 MG/ML
JELLY TOPICAL ONCE
OUTPATIENT
Start: 2022-10-07 | End: 2022-10-07

## 2022-10-07 RX ORDER — LIDOCAINE 50 MG/G
OINTMENT TOPICAL ONCE
OUTPATIENT
Start: 2022-10-07 | End: 2022-10-07

## 2022-10-07 RX ORDER — BETAMETHASONE DIPROPIONATE 0.05 %
OINTMENT (GRAM) TOPICAL ONCE
OUTPATIENT
Start: 2022-10-07 | End: 2022-10-07

## 2022-10-07 RX ORDER — GINSENG 100 MG
CAPSULE ORAL ONCE
OUTPATIENT
Start: 2022-10-07 | End: 2022-10-07

## 2022-10-07 RX ORDER — BACITRACIN, NEOMYCIN, POLYMYXIN B 400; 3.5; 5 [USP'U]/G; MG/G; [USP'U]/G
OINTMENT TOPICAL ONCE
OUTPATIENT
Start: 2022-10-07 | End: 2022-10-07

## 2022-10-07 RX ORDER — CLOBETASOL PROPIONATE 0.5 MG/G
OINTMENT TOPICAL ONCE
OUTPATIENT
Start: 2022-10-07 | End: 2022-10-07

## 2022-10-07 RX ORDER — LIDOCAINE 40 MG/G
CREAM TOPICAL ONCE
OUTPATIENT
Start: 2022-10-07 | End: 2022-10-07

## 2022-10-07 RX ORDER — BACITRACIN ZINC AND POLYMYXIN B SULFATE 500; 1000 [USP'U]/G; [USP'U]/G
OINTMENT TOPICAL ONCE
OUTPATIENT
Start: 2022-10-07 | End: 2022-10-07

## 2022-10-07 RX ORDER — GENTAMICIN SULFATE 1 MG/G
OINTMENT TOPICAL ONCE
OUTPATIENT
Start: 2022-10-07 | End: 2022-10-07

## 2022-10-07 RX ORDER — LIDOCAINE HYDROCHLORIDE 40 MG/ML
SOLUTION TOPICAL ONCE
OUTPATIENT
Start: 2022-10-07 | End: 2022-10-07

## 2022-10-07 ASSESSMENT — PAIN SCALES - GENERAL: PAINLEVEL_OUTOF10: 0

## 2022-10-07 NOTE — DISCHARGE INSTRUCTIONS
PHYSICIAN ORDERS AND DISCHARGE INSTRUCTIONS     NOTE: Upon discharge from the [address], you will receive a patient experience survey. We would be grateful if you would take the time to fill this survey out. Wound care order history:                 HORTENSIA's   Right  1.36     Left 1.29            Date: 9/2/2022              Cultures:                Grafts:                Antibiotics:           Continuing wound care orders and information:                 Residence:  Private               Continue home health care with: None at this time               Your wound-care supplies will be provided by: Wound cleansing:               Do not scrub or use excessive force. Wash hands with soap and water before and after dressing changes. Prior to applying a clean dressing, cleanse wound with normal saline, wound cleanser, or mild soap and water. Ask the physician or nurse before getting the wound(s) wet in a shower     Daily Wound management:              Keep weight off wounds and reposition every 2 hours. Avoid standing for long periods of time. Apply wraps/stockings in AM and remove at bedtime. If swelling is present, elevate legs to the level of the heart or above for 30 minutes 4-5 times a day and/or when sitting. When taking antibiotics take entire prescription as ordered by physician do not stop taking until medicine is all gone. Orders for this week: 10/7/22                  Venous studies ordered 9/2/22     Rx: Pitney Kavitha     Bilateral Lower Extremities - Wash with soap and water, pat dry. Apply Phuong to wound bed. Cover with Silicone Border. Apply Juxtalite's at 30mmhg (make sure client/family know how to put on). Change Phuong and bandage every 2-3 days until healed.  Wear Juxtalite's daily during the day, may remove at night if legs are elevated. Juxtalites ordered 9/16/22  Heel to knee measurement - 42cm        Discharged from the wound care center on 10/7/22       --if wounds reopen, call for followup appointment. Call [phone] for any questions or concerns.   Date__________   Time____________

## 2022-10-07 NOTE — PLAN OF CARE
Problem: Chronic Conditions and Co-morbidities  Goal: Patient's chronic conditions and co-morbidity symptoms are monitored and maintained or improved  Outcome: Completed     Problem: Wound:  Goal: Will show signs of wound healing; wound closure and no evidence of infection  Description: Will show signs of wound healing; wound closure and no evidence of infection  Outcome: Completed

## 2022-10-07 NOTE — PROGRESS NOTES
Wound Care Center Progress Note       Quinn Calloway  AGE: 79 y.o. GENDER: male  : 1940  TODAY'S DATE:  10/7/2022        Subjective:     Chief Complaint   Patient presents with    Wound Check     BLE         HISTORY of PRESENT ILLNESS     Quinn Calloway is a 79 y.o. male who presents to the [address] [place] [address] for a visit for evaluation and treatment of Acute on chronic venous  ulcer(s) of  R lower leg medial, R lower leg lateral, R lower leg anterior, R lower leg posterior, L lower leg medial, L lower leg lateral, L lower leg anterior, L lower leg posterior. The condition is of moderate severity. The ulcer has been present for 2 weeks, but he is beginning to have this as a reoccurring issues. Patient was last seen in this office in March by Dr. Carlos Eduardo Downing. The underlying cause is thought to be venous insufficiency. The patients care to date has included nothing so far. The patient has significant underlying medical conditions as below. Healed today and brought juxta-lites       Patient educated on the 6 essential components necessary for wound healing: Circulation, Debridements, Proper Dressings and Topical Wound Products, Infection Control, Edema Control and Offloading. Patient educated on those factors that negatively effect or impact wound healing: smoking, obesity, uncontrolled diabetes, anticoagulant and immunosuppressive regimens, inadequate nutrition, untreated arterial and venous disease if applicable and measures to manage edema.        Wound Pain Timing/Severity: waxing and waning  Quality of pain: sharp, burning, throbbing, tender  Severity of pain:  5 / 10   Modifying Factors: edema, venous stasis, and obesity  Associated Signs/Symptoms: edema, erythema, drainage, and pain        PAST MEDICAL HISTORY        Diagnosis Date    Aortic regurgitation 2012    4/3/2012-mild; 2011-mild to moderate    Arrhythmia 2011-tachycardia episode during cardiac cath-48 HR Holter monitor normal    Blisters of multiple sites 10/05/2016    CAD (coronary artery disease)     sees Dr Jacey Robles, prior Dr Setphanie Simon;    Cardiac pacemaker 04/2001 4/2001-[employer] PPM Integrity DDD Model 5342    Congestive heart failure [facility])     Family history of coronary artery disease     Father-MI    GERD (gastroesophageal reflux disease)     H/O cardiac catheterization 5/2/2012, 2/22/2011, 3/4/2010, 2/2001 5/2/2012-C-No evid of signif CAD; Normal peripheral angiogram; 2/22/2011-No signif CAD, stent patent, had tachycardic event; 3/4/2010-Severe stenosis and re in-stent stenosis of LAD    H/O cardiovascular stress test 4/16/2012, 2/7/2011 4/16/2012-Normal Lexiscan study. No evid of ischemia. Attenuation artifact in inferior region of myocardium. EF 58%. H/O cardiovascular stress test 03/04/2015    normal, no ischemia, EF62%    H/O cardiovascular stress test 12/06/2017    EF60% normal study    H/O Doppler ultrasound 02/07/2011 2/7/2011-CAROTID US-Normal    H/O Doppler ultrasound 05/24/2013    periphal normal    H/O Doppler ultrasound 06/18/2014 6/14 Normal    H/O Doppler ultrasound 11/13/15 09/03/15    11/15 WNL 9/15Arterial and Venous Doppler is normal.     H/O echocardiogram 03/04/2015    EF60% Normal LV and systolic function. Trace MR, Aortic, and TR. No other valvulopathy.      H/O echocardiogram 08/08/2016    EF60% mild AR, mild phtn    H/O echocardiogram 11/01/2017    EF55-60% sclerotic, non stenotic AV, mild AR, phtn    H/O peptic ulcer 1970s    Hiatal hernia     History of cardiac monitoring 06/01/2015    Event - Patient noted to have recurrence of atrial fibrillation with RVR on medical therapy on event monitor    History of Doppler carotid echocardiogram 01/15/2020    No hemodynamically significant stenosis in the internal carotid artery bilaterally, Right internal carotid artery exhibits mild homogenous plaque, Left internal exhibits minimal homogenous plaque, totuosity of the bilateral internal carotid arteries    History of Holter monitoring 05/19/2015    24 hour - predominant rhythm was sinus intermittent paced, paced rhythm can not be ruled out d/t pacemaker function being turned off during device setup, limited pacemaker data available    History of nuclear stress test 11/10/2016    lexiscan-normal,EF70%    HX OTHER MEDICAL 05/02/2012 5/2/2012-PERIPHERAL ANGIOGRAM-Normal-Dr Molina    HX OTHER MEDICAL 03/02/2011    3/2/2011-48 HR HOLTER MONITOR-Pred Rhythm is sinus rhythm. No arrhythmias. Hyperlipidemia     Hypertension     Nuclear Stress 07/07/2021    EF 53%.     S/P angioplasty with stent 3/4/2010, 2/2001    3/4/2010-PTCA with 2.75 X 38 mm stent to LAD; 2/2001-PTCA with stent to LAD    S/P cardiac pacemaker procedure 06/04/2013    battery replacement    Sleep apnea     has CPAP    WD-Chronic venous hypertension (idiopathic) with ulcer of bilateral lower extremity (CODE) ([address] 2/18/2022    WD-Non-pressure chronic ulcer of other part of left lower leg limited to breakdown of skin ([address] 2/18/2022    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 ([address] 2/18/2022       PAST SURGICAL HISTORY    Past Surgical History:   Procedure Laterality Date    ATRIAL ABLATION SURGERY  8/11/15    Atrial fib/flutter ablation     CARDIAC CATHETERIZATION  06/14/2015    no significant disease    CARDIAC PACEMAKER PLACEMENT  4/2001 4/2001-[employer] PPM Integrity DDD Model 5342    CARPAL TUNNEL RELEASE Bilateral 1976    CORONARY ANGIOPLASTY WITH STENT PLACEMENT  3/4/2010, 2/2001    3/4/2010-PTCA with 2.75 X 38 mm stent to LAD; 2/2001-PTCA with stent to LAD;    DIAGNOSTIC CARDIAC CATH LAB PROCEDURE  5/2/2012,2/22/2011, 3/4/2010, 2/2001 5/2/2012-[facility]-No evid of signif CAD; Normal peripheral angiogram; 2/22/2011-No signif CAD, stent patent, had tachycardic event; 3/4/2010-Severe stenosis and re in-stent stenosis of LAD    FINGER TRIGGER RELEASE Left 1994    thumb    HAND TENDON SURGERY Left 1976    Left middle finger    HERNIA REPAIR Bilateral 1993    bilateral inguinal hernias    JOINT REPLACEMENT      knees bilaterally-Right 10/2009; 1/2008-Left    KNEE ARTHROSCOPY  11/2005, 1989 11/2005, 1989-Right knee    LUMBAR DISC ARTHROPLASTY  07/08/2016    OTHER SURGICAL HISTORY  5/2/2012 5/2/2012-PERIPHERAL ANGIOGRAM-Normal    UPPER GASTROINTESTINAL ENDOSCOPY  5/3/2001    5/3/2835-[facility])       FAMILY HISTORY    Family History   Problem Relation Age of Onset    Heart Disease Mother     High Blood Pressure Mother     Stroke Father     Coronary Art Dis Father         MI    Other Brother         accidental    Brain Cancer Paternal Grandfather        SOCIAL HISTORY    Social History     Tobacco Use    Smoking status: Never    Smokeless tobacco: Never   Vaping Use    Vaping Use: Never used   Substance Use Topics    Alcohol use: No     Alcohol/week: 0.0 standard drinks    Drug use: No       ALLERGIES    Allergies   Allergen Reactions    Ceclor [Cefaclor] Rash    Celebrex [Celecoxib] Rash    Pcn [Penicillins] Rash    Relafen [Nabumetone] Rash       MEDICATIONS    Current Outpatient Medications on File Prior to Encounter   Medication Sig Dispense Refill    torsemide (DEMADEX) 20 MG tablet Take 1 tablet by mouth in the morning and at bedtime 60 tablet 3    amLODIPine (NORVASC) 5 MG tablet       TRAZODONE HCL PO Take by mouth      DULoxetine (CYMBALTA) 30 MG extended release capsule Take 30 mg by mouth daily      ferrous sulfate (IRON 325) 325 (65 Fe) MG tablet Take 325 mg by mouth daily      potassium chloride (KLOR-CON M) 20 MEQ extended release tablet Take 1 tablet by mouth daily      diclofenac-miSOPROStol (ARTHROTEC 75) 75-0.2 MG per tablet Take 1 tablet by mouth 2 times daily      tiZANidine (ZANAFLEX) 4 MG tablet TAKE 1/2 TABLET BY MOUTH THREE TIMES A DAY FOR 2 DAYS, THEN TAKE 1 TABLET BY MOUTH THREE TIMES A DAY AFTER  2    losartan (COZAAR) 50 MG tablet Take 50 mg by mouth daily       dexlansoprazole (DEXILANT) 60 MG CPDR delayed release capsule Take 60 mg by mouth daily      zolpidem (AMBIEN) 10 MG tablet Take by mouth nightly as needed for Sleep      dronedarone hcl (MULTAQ) 400 MG TABS Take 400 mg by mouth 2 times daily (with meals)      nitroGLYCERIN (NITROLINGUAL) 0.4 MG/SPRAY 0.4 mg spray Place 1 spray under the tongue every 5 minutes as needed for Chest pain      CPAP Machine MISC by Does not apply route nightly      apixaban (ELIQUIS) 5 MG TABS tablet Take 1 tablet by mouth 2 times daily 60 tablet 3    rosuvastatin (CRESTOR) 20 MG tablet Take 20 mg by mouth daily. pramipexole (MIRAPEX) 1 MG tablet   Take 0.5 mg by mouth 2 times daily Patient takes 1/2 tablet in the am and 1 1/2 tablet at night. metoprolol (LOPRESSOR) 50 MG tablet Take 50 mg by mouth 2 times daily. modafinil (PROVIGIL) 100 MG tablet Take 100 mg by mouth daily. esomeprazole (NEXIUM) 40 MG capsule Take 40 mg by mouth 2 times daily. aspirin 81 MG chewable tablet Take 81 mg by mouth daily. No current facility-administered medications on file prior to encounter. REVIEW OF SYSTEMS    Pertinent items are noted in HPI. Constitutional: Negative for systemic symptoms including fever, chills and malaise. Objective:      BP (!) 161/77   Pulse 86   Temp 98 °F (36.7 °C) (Temporal)   Resp 20     PHYSICAL EXAM      General: The patient is in no acute distress. Mental status:  Patient is appropriate, is  oriented to place and plan of care. Dermatologic exam: Visual inspection of the periwound reveals the skin to be normal in turgor and texture and dry.   Wound exam:  see wound description below     All active wounds listed below with today's date are evaluated      Wound 10/05/16 Venous ulcer WOUND #1 RT PROXIMAL LEG(ONSET 2 MTHS) (Active)   Number of days: 2192       Wound 10/05/16 Venous ulcer WOUND #2 RT LATERAL LEG CLUSTER (ONSET 2MTHS) (Active)   Number of days: 2192       Wound 10/05/16 Venous ulcer WOUND #3RT DISTAL LEG(ONSET 2 MTHS) (Active)   Number of days: 2192       Wound 10/05/16 Venous ulcer WOUND 4 RT MEDIAL LEG (ONSET 2 MTHS) (Active)   Number of days: 2192       Wound 09/02/22 Tibial Distal;Right;Posterior #2 (Active)   Wound Image   09/23/22 1057   Wound Etiology Venous 10/07/22 1119   Dressing Status New dressing applied 09/23/22 1151   Wound Cleansed Soap and water 10/07/22 1119   Offloading for Diabetic Foot Ulcers Offloading not required 10/07/22 1119   Wound Length (cm) 0 cm 10/07/22 1119   Wound Width (cm) 0 cm 10/07/22 1119   Wound Depth (cm) 0 cm 10/07/22 1119   Wound Surface Area (cm^2) 0 cm^2 10/07/22 1119   Change in Wound Size % (l*w) 100 10/07/22 1119   Wound Volume (cm^3) 0 cm^3 10/07/22 1119   Wound Healing % 100 10/07/22 1119   Post-Procedure Length (cm) 1 cm 09/16/22 1116   Post-Procedure Width (cm) 1 cm 09/16/22 1116   Post-Procedure Depth (cm) 0.1 cm 09/16/22 1116   Post-Procedure Surface Area (cm^2) 1 cm^2 09/16/22 1116   Post-Procedure Volume (cm^3) 0.1 cm^3 09/16/22 1116   Distance Tunneling (cm) 0 cm 10/07/22 1119   Tunneling Position ___ O'Clock 0 10/07/22 1119   Undermining Starts ___ O'Clock 0 10/07/22 1119   Undermining Ends___ O'Clock 0 10/07/22 1119   Undermining Maxium Distance (cm) 0 10/07/22 1119   Wound Assessment Dry 10/07/22 1119   Drainage Amount None 10/07/22 1119   Drainage Description Green 09/16/22 1103   Odor None 10/07/22 1119   Kiki-wound Assessment Intact;Dry/flaky 10/07/22 1119   Margins Defined edges 10/07/22 1119   Wound Thickness Description not for Pressure Injury Full thickness 10/07/22 1119   Number of days: 35       Assessment:       Problem List Items Addressed This Visit          Circulatory    WD-Chronic venous hypertension (idiopathic) with ulcer of bilateral lower extremity (CODE) ([facility] - Primary    Relevant Orders    Initiate Outpatient Wound Care Protocol       Other    WD-Non-pressure chronic ulcer of other part of left lower leg limited to breakdown of skin [facility])    Relevant Orders    Initiate Outpatient Wound Care Protocol    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 [facility])    Relevant Orders    Initiate Outpatient Wound Care Protocol       Status of wound progress and description from last visit:   Healed. Discharge at this time. Patient knows that they may return or call with any questions, comments, or concerns. Discussed strategies for preventing future wounds, including regular compression, daily moisturizing, regular exercise, and performing regular foot checks. Patient verbalized understanding          Plan:     Discharge Instructions         PHYSICIAN ORDERS AND DISCHARGE INSTRUCTIONS     NOTE: Upon discharge from the [address], you will receive a patient experience survey. We would be grateful if you would take the time to fill this survey out. Wound care order history:                 HORTENSIA's   Right  1.36     Left 1.29            Date: 9/2/2022              Cultures:                Grafts:                Antibiotics:           Continuing wound care orders and information:                 Residence:  Private               Continue home health care with: None at this time               Your wound-care supplies will be provided by: Wound cleansing:               Do not scrub or use excessive force. Wash hands with soap and water before and after dressing changes. Prior to applying a clean dressing, cleanse wound with normal saline, wound cleanser, or mild soap and water. Ask the physician or nurse before getting the wound(s) wet in a shower     Daily Wound management:              Keep weight off wounds and reposition every 2 hours. Avoid standing for long periods of time. Apply wraps/stockings in AM and remove at bedtime.               If swelling is present, elevate legs to the level of the heart or above for 30 minutes 4-5 times a day and/or when sitting. When taking antibiotics take entire prescription as ordered by physician do not stop taking until medicine is all gone. Orders for this week: 10/7/22                  Venous studies ordered 9/2/22     Rx: Paramj[facility] [address]     Bilateral Lower Extremities - Wash with soap and water, pat dry. Apply Phuong to wound bed. Cover with Silicone Border. Apply Juxtalite's at 30mmhg (make sure client/family know how to put on). Change Phuong and bandage every 2-3 days until healed. Wear Juxtalite's daily during the day, may remove at night if legs are elevated. Juxtalites ordered 9/16/22  Heel to knee measurement - 42cm        Discharged from the wound care center on 10/7/22       --if wounds reopen, call for followup appointment. Call [phone] for any questions or concerns.   Date__________   Time____________        Treatment Note      Written Patient Dismissal Instructions Given            Electronically signed by MALLORY Baca CNP on 10/7/2022 at 11:50 AM

## 2022-10-17 ENCOUNTER — TELEPHONE (OUTPATIENT)
Dept: CARDIOLOGY CLINIC | Age: 82
End: 2022-10-17

## 2022-10-17 ENCOUNTER — PROCEDURE VISIT (OUTPATIENT)
Dept: CARDIOLOGY CLINIC | Age: 82
End: 2022-10-17
Payer: MEDICARE

## 2022-10-17 DIAGNOSIS — Z95.0 CARDIAC PACEMAKER IN SITU: Primary | ICD-10-CM

## 2022-10-17 PROCEDURE — 93296 REM INTERROG EVL PM/IDS: CPT | Performed by: INTERNAL MEDICINE

## 2022-10-17 PROCEDURE — 93294 REM INTERROG EVL PM/LDLS PM: CPT | Performed by: INTERNAL MEDICINE

## 2022-10-17 NOTE — LETTER
[address]  [address] [place] [address]  Phone: [phone]      October 17, 2022      [address]      Dear Aliyah Leach: This is your [facility] schedule. Please jhon your calendar with these dates. You can do you checks anytime during the scheduled day. Since we do not do reminder calls, it will be your responsibility to preform the checks on the day it is scheduled. If you have any questions or concerns, please call and ask for No Schwartz at [phone]. Thank you.

## 2022-10-19 ENCOUNTER — PROCEDURE VISIT (OUTPATIENT)
Dept: CARDIOLOGY CLINIC | Age: 82
End: 2022-10-19
Payer: MEDICARE

## 2022-10-19 DIAGNOSIS — I48.0 PAF (PAROXYSMAL ATRIAL FIBRILLATION) (HCC): ICD-10-CM

## 2022-10-19 DIAGNOSIS — I73.9 CLAUDICATION (HCC): ICD-10-CM

## 2022-10-19 DIAGNOSIS — M79.89 SWELLING OF EXTREMITY: ICD-10-CM

## 2022-10-19 PROCEDURE — 93925 LOWER EXTREMITY STUDY: CPT | Performed by: INTERNAL MEDICINE

## 2022-10-19 PROCEDURE — 93922 UPR/L XTREMITY ART 2 LEVELS: CPT | Performed by: INTERNAL MEDICINE

## 2022-10-19 PROCEDURE — 93970 EXTREMITY STUDY: CPT | Performed by: INTERNAL MEDICINE

## 2022-10-21 ENCOUNTER — TELEPHONE (OUTPATIENT)
Dept: CARDIOLOGY CLINIC | Age: 82
End: 2022-10-21

## 2022-10-21 NOTE — TELEPHONE ENCOUNTER
Called to advise of Vas results -   Summary        No evidence of significant occlusive arterial disease. The Right HORTENSIA shows normal arterial flow. The left HORTENSIA is greater than 1.3 suggestive of atherosclerotic hardening of    arteries. Summary        No evidence of DVT or SVT in the bilateral common femoral vein, femoral    vein, popliteal vein, greater saphenous vein or right small saphenous vein. 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 Significant reflux noted of the Right GSV SFJ (0.8s), and GSV Knee (2.4s). Significant reflux noted in the Left CFV (2.6s), Popliteal V (1.3s), and SSV    Proximal (3.2s).         Recommendations        compression socks    see dr Ninfa Zafar     Scheduled appt for 11/22/22 with Dr. Onofre Galarza

## 2022-11-09 ENCOUNTER — HOSPITAL ENCOUNTER (OUTPATIENT)
Dept: WOUND CARE | Age: 82
Discharge: HOME | End: 2022-11-09
Payer: MEDICARE

## 2022-11-09 VITALS
DIASTOLIC BLOOD PRESSURE: 74 MMHG | HEART RATE: 94 BPM | RESPIRATION RATE: 18 BRPM | SYSTOLIC BLOOD PRESSURE: 154 MMHG | TEMPERATURE: 97.9 F

## 2022-11-09 DIAGNOSIS — L97.812 NON-PRESSURE CHRONIC ULCER OF OTHER PART OF RIGHT LOWER LEG WITH FAT LAYER EXPOSED (HCC): ICD-10-CM

## 2022-11-09 DIAGNOSIS — I83.012 VENOUS STASIS ULCER OF RIGHT CALF WITH FAT LAYER EXPOSED, UNSPECIFIED WHETHER VARICOSE VEINS PRESENT (HCC): ICD-10-CM

## 2022-11-09 DIAGNOSIS — I87.313 CHRONIC VENOUS HYPERTENSION (IDIOPATHIC) WITH ULCER OF BILATERAL LOWER EXTREMITY (CODE) (HCC): Primary | ICD-10-CM

## 2022-11-09 DIAGNOSIS — L97.821 NON-PRESSURE CHRONIC ULCER OF OTHER PART OF LEFT LOWER LEG LIMITED TO BREAKDOWN OF SKIN (HCC): ICD-10-CM

## 2022-11-09 DIAGNOSIS — L97.212 VENOUS STASIS ULCER OF RIGHT CALF WITH FAT LAYER EXPOSED, UNSPECIFIED WHETHER VARICOSE VEINS PRESENT (HCC): ICD-10-CM

## 2022-11-09 PROCEDURE — 11045 DBRDMT SUBQ TISS EACH ADDL: CPT | Performed by: NURSE PRACTITIONER

## 2022-11-09 PROCEDURE — 6370000000 HC RX 637 (ALT 250 FOR IP): Performed by: NURSE PRACTITIONER

## 2022-11-09 PROCEDURE — 99213 OFFICE O/P EST LOW 20 MIN: CPT | Performed by: NURSE PRACTITIONER

## 2022-11-09 PROCEDURE — 11045 DBRDMT SUBQ TISS EACH ADDL: CPT

## 2022-11-09 PROCEDURE — 11042 DBRDMT SUBQ TIS 1ST 20SQCM/<: CPT | Performed by: NURSE PRACTITIONER

## 2022-11-09 PROCEDURE — 11042 DBRDMT SUBQ TIS 1ST 20SQCM/<: CPT

## 2022-11-09 RX ORDER — LIDOCAINE 50 MG/G
OINTMENT TOPICAL ONCE
Status: CANCELLED | OUTPATIENT
Start: 2022-11-09 | End: 2022-11-09

## 2022-11-09 RX ORDER — LIDOCAINE HYDROCHLORIDE 20 MG/ML
JELLY TOPICAL ONCE
Status: CANCELLED | OUTPATIENT
Start: 2022-11-09 | End: 2022-11-09

## 2022-11-09 RX ORDER — BETAMETHASONE DIPROPIONATE 0.05 %
OINTMENT (GRAM) TOPICAL ONCE
Status: CANCELLED | OUTPATIENT
Start: 2022-11-09 | End: 2022-11-09

## 2022-11-09 RX ORDER — BACITRACIN ZINC AND POLYMYXIN B SULFATE 500; 1000 [USP'U]/G; [USP'U]/G
OINTMENT TOPICAL ONCE
Status: CANCELLED | OUTPATIENT
Start: 2022-11-09 | End: 2022-11-09

## 2022-11-09 RX ORDER — GENTAMICIN SULFATE 1 MG/G
OINTMENT TOPICAL ONCE
Status: CANCELLED | OUTPATIENT
Start: 2022-11-09 | End: 2022-11-09

## 2022-11-09 RX ORDER — GINSENG 100 MG
CAPSULE ORAL ONCE
Status: CANCELLED | OUTPATIENT
Start: 2022-11-09 | End: 2022-11-09

## 2022-11-09 RX ORDER — LIDOCAINE 40 MG/G
CREAM TOPICAL ONCE
Status: CANCELLED | OUTPATIENT
Start: 2022-11-09 | End: 2022-11-09

## 2022-11-09 RX ORDER — CLOBETASOL PROPIONATE 0.5 MG/G
OINTMENT TOPICAL ONCE
Status: CANCELLED | OUTPATIENT
Start: 2022-11-09 | End: 2022-11-09

## 2022-11-09 RX ORDER — BACITRACIN, NEOMYCIN, POLYMYXIN B 400; 3.5; 5 [USP'U]/G; MG/G; [USP'U]/G
OINTMENT TOPICAL ONCE
Status: CANCELLED | OUTPATIENT
Start: 2022-11-09 | End: 2022-11-09

## 2022-11-09 RX ORDER — LIDOCAINE HYDROCHLORIDE 40 MG/ML
SOLUTION TOPICAL ONCE
Status: CANCELLED | OUTPATIENT
Start: 2022-11-09 | End: 2022-11-09

## 2022-11-09 RX ORDER — GENTAMICIN SULFATE 1 MG/G
OINTMENT TOPICAL ONCE
Status: COMPLETED | OUTPATIENT
Start: 2022-11-09 | End: 2022-11-09

## 2022-11-09 RX ADMIN — GENTAMICIN SULFATE: 1 OINTMENT TOPICAL at 16:00

## 2022-11-09 NOTE — PROGRESS NOTES
Multilayer Compression Wrap   (Not Unna) Below the Knee    NAME:  Sheron Hatch  YOB: 1940  MEDICAL RECORD NUMBER:  [id number]  DATE:  11/9/2022    Multilayer compression wrap: Removed old Multilayer wrap if indicated and wash leg with mild soap/water. Applied moisturizing agent to dry skin as needed. Applied primary and secondary dressing as ordered. Applied multilayered dressing below the knee to right lower leg. Instructed patient/caregiver not to remove dressing and to keep it clean and dry. Instructed patient/caregiver on complications to report to provider, such as pain, numbness in toes, heavy drainage, and slippage of dressing. Instructed patient on purpose of compression dressing and on activity and exercise recommendations.       Electronically signed by Lincoln Khoury LPN on 43/1/2792 at 0:55 PM

## 2022-11-09 NOTE — PROGRESS NOTES
Wound Care Center Progress Note With Procedure    Gustavo Gabriel  AGE: 79 y.o. GENDER: male  : 1940  EPISODE DATE:  2022     Subjective:     Chief Complaint   Patient presents with    Wound Check     Left lower leg         HISTORY of PRESENT ILLNESS      Gustavo Gabriel is a 79 y.o. male who presents today for wound evaluation of Acute venous ulcer(s) of the right lower extremity . The ulcer is of moderate severity. The underlying cause of the wound is venous hypertensions. Patient is known to us and was discharged about a month ago with juxta-lites. Was doing well but developed a large blister and wanted to be seen quickly. Patient presents with a new blister to right interior lower extremity   Has only been present a few days and he lanced this and drained at home. Very little depth.     Wound Pain Timing/Severity: waxing and waning  Quality of pain: aching, tender  Severity of pain:  1 / 10   Modifying Factors: edema, venous stasis, and obesity  Associated Signs/Symptoms: edema, erythema, drainage, and pain        PAST MEDICAL HISTORY        Diagnosis Date    Aortic regurgitation 2012    4/3/2012-mild; 2011-mild to moderate    Arrhythmia 2011-tachycardia episode during cardiac cath-48 HR Holter monitor normal    Blisters of multiple sites 10/05/2016    CAD (coronary artery disease)     sees Dr Suzanne Martinez, prior Dr Miguel A Martinez;    Cardiac pacemaker 2001-[employer] PPM Integrity DDD Model 5342    Congestive heart failure ([address]     Family history of coronary artery disease     Father-MI    GERD (gastroesophageal reflux disease)     H/O cardiac catheterization 2012, 2011, 3/4/2010, 2012-[facility]-No evid of signif CAD; Normal peripheral angiogram; 2011-No signif CAD, stent patent, had tachycardic event; 3/4/2010-Severe stenosis and re in-stent stenosis of LAD    H/O cardiovascular stress test 2012, 2011-Normal [place] [facility] study. No evid of ischemia. Attenuation artifact in inferior region of myocardium. EF 58%. H/O cardiovascular stress test 03/04/2015    normal, no ischemia, EF62%    H/O cardiovascular stress test 12/06/2017    EF60% normal study    H/O Doppler ultrasound 02/07/2011 2/7/2011-CAROTID US-Normal    H/O Doppler ultrasound 05/24/2013    periphal normal    H/O Doppler ultrasound 06/18/2014 6/14 Normal    H/O Doppler ultrasound 11/13/15 09/03/15    11/15 WNL 9/15Arterial and Venous Doppler is normal.     H/O echocardiogram 03/04/2015    EF60% Normal LV and systolic function. Trace MR, Aortic, and TR. No other valvulopathy. H/O echocardiogram 08/08/2016    EF60% mild AR, mild phtn    H/O echocardiogram 11/01/2017    EF55-60% sclerotic, non stenotic AV, mild AR, phtn    H/O peptic ulcer 1970s    Hiatal hernia     History of cardiac monitoring 06/01/2015    Event - Patient noted to have recurrence of atrial fibrillation with RVR on medical therapy on event monitor    History of Doppler carotid echocardiogram 01/15/2020    No hemodynamically significant stenosis in the internal carotid artery bilaterally, Right internal carotid artery exhibits mild homogenous plaque, Left internal exhibits minimal homogenous plaque, totuosity of the bilateral internal carotid arteries    History of Holter monitoring 05/19/2015    24 hour - predominant rhythm was sinus intermittent paced, paced rhythm can not be ruled out d/t pacemaker function being turned off during device setup, limited pacemaker data available    History of nuclear stress test 11/10/2016    lexiscan-normal,EF70%    HX OTHER MEDICAL 05/02/2012 5/2/2012-PERIPHERAL ANGIOGRAM-Normal-Dr Molina    HX OTHER MEDICAL 03/02/2011    3/2/2011-48 HR HOLTER MONITOR-Pred Rhythm is sinus rhythm. No arrhythmias. Hyperlipidemia     Hypertension     Nuclear Stress 07/07/2021    EF 53%.     S/P angioplasty with stent 3/4/2010, 2/2001    3/4/2010-PTCA with 2.75 X 38 mm stent to LAD; 2/2001-PTCA with stent to LAD    S/P cardiac pacemaker procedure 06/04/2013    battery replacement    Sleep apnea     has CPAP    WD-Chronic venous hypertension (idiopathic) with ulcer of bilateral lower extremity (CODE) ([facility] 2/18/2022    WD-Non-pressure chronic ulcer of other part of left lower leg limited to breakdown of skin ([address] 2/18/2022    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 ([address] 2/18/2022       PAST SURGICAL HISTORY    Past Surgical History:   Procedure Laterality Date    ATRIAL ABLATION SURGERY  8/11/15    Atrial fib/flutter ablation     CARDIAC CATHETERIZATION  06/14/2015    no significant disease    CARDIAC PACEMAKER PLACEMENT  4/2001 4/2001-[employer] PPM Integrity DDD Model 5342    CARPAL TUNNEL RELEASE Bilateral 1976    CORONARY ANGIOPLASTY WITH STENT PLACEMENT  3/4/2010, 2/2001    3/4/2010-PTCA with 2.75 X 38 mm stent to LAD; 2/2001-PTCA with stent to LAD;    DIAGNOSTIC CARDIAC CATH LAB PROCEDURE  5/2/2012,2/22/2011, 3/4/2010, 2/2001 5/2/2012-[facility]-No evid of signif CAD; Normal peripheral angiogram; 2/22/2011-No signif CAD, stent patent, had tachycardic event; 3/4/2010-Severe stenosis and re in-stent stenosis of LAD    FINGER TRIGGER RELEASE Left 1994    thumb    HAND TENDON SURGERY Left 1976    Left middle finger    HERNIA REPAIR Bilateral 1993    bilateral inguinal hernias    JOINT REPLACEMENT      knees bilaterally-Right 10/2009; 1/2008-Left    KNEE ARTHROSCOPY  11/2005, 1989 11/2005, 1989-Right knee    LUMBAR DISC ARTHROPLASTY  07/08/2016    OTHER SURGICAL HISTORY  5/2/2012 5/2/2012-PERIPHERAL ANGIOGRAM-Normal    UPPER GASTROINTESTINAL ENDOSCOPY  5/3/2001    5/3/0193-[facility])       FAMILY HISTORY    Family History   Problem Relation Age of Onset    Heart Disease Mother     High Blood Pressure Mother     Stroke Father     Coronary Art Dis Father         MI    Other Brother         accidental    Brain Cancer Paternal Grandfather        SOCIAL HISTORY    Social History     Tobacco Use    Smoking status: Never    Smokeless tobacco: Never   Vaping Use    Vaping Use: Never used   Substance Use Topics    Alcohol use: No     Alcohol/week: 0.0 standard drinks    Drug use: No       ALLERGIES    Allergies   Allergen Reactions    Ceclor [Cefaclor] Rash    Celebrex [Celecoxib] Rash    Pcn [Penicillins] Rash    Relafen [Nabumetone] Rash       MEDICATIONS    Current Outpatient Medications on File Prior to Encounter   Medication Sig Dispense Refill    torsemide (DEMADEX) 20 MG tablet Take 1 tablet by mouth in the morning and at bedtime 60 tablet 3    amLODIPine (NORVASC) 5 MG tablet       TRAZODONE HCL PO Take by mouth      DULoxetine (CYMBALTA) 30 MG extended release capsule Take 30 mg by mouth daily      ferrous sulfate (IRON 325) 325 (65 Fe) MG tablet Take 325 mg by mouth daily      potassium chloride (KLOR-CON M) 20 MEQ extended release tablet Take 1 tablet by mouth daily      diclofenac-miSOPROStol (ARTHROTEC 75) 75-0.2 MG per tablet Take 1 tablet by mouth 2 times daily      tiZANidine (ZANAFLEX) 4 MG tablet TAKE 1/2 TABLET BY MOUTH THREE TIMES A DAY FOR 2 DAYS, THEN TAKE 1 TABLET BY MOUTH THREE TIMES A DAY AFTER  2    losartan (COZAAR) 50 MG tablet Take 50 mg by mouth daily       dexlansoprazole (DEXILANT) 60 MG CPDR delayed release capsule Take 60 mg by mouth daily      zolpidem (AMBIEN) 10 MG tablet Take by mouth nightly as needed for Sleep      dronedarone hcl (MULTAQ) 400 MG TABS Take 400 mg by mouth 2 times daily (with meals)      nitroGLYCERIN (NITROLINGUAL) 0.4 MG/SPRAY 0.4 mg spray Place 1 spray under the tongue every 5 minutes as needed for Chest pain      CPAP Machine MISC by Does not apply route nightly      apixaban (ELIQUIS) 5 MG TABS tablet Take 1 tablet by mouth 2 times daily 60 tablet 3    rosuvastatin (CRESTOR) 20 MG tablet Take 20 mg by mouth daily.       pramipexole (MIRAPEX) 1 MG tablet   Take 0.5 mg by mouth 2 times daily Patient takes 1/2 tablet in the am and 1 1/2 tablet at night. metoprolol (LOPRESSOR) 50 MG tablet Take 50 mg by mouth 2 times daily. modafinil (PROVIGIL) 100 MG tablet Take 100 mg by mouth daily. esomeprazole (NEXIUM) 40 MG capsule Take 40 mg by mouth 2 times daily. aspirin 81 MG chewable tablet Take 81 mg by mouth daily. No current facility-administered medications on file prior to encounter. REVIEW OF SYSTEMS    Pertinent items are noted in HPI. Constitutional: Negative for systemic symptoms including fever, chills and malaise. Objective:      BP (!) 154/74   Pulse 94   Temp 97.9 °F (36.6 °C) (Temporal)   Resp 18     PHYSICAL EXAM      General: The patient is in no acute distress. Mental status:  Patient is appropriate, is  oriented to place and plan of care. Dermatologic exam: Visual inspection of the periwound reveals the skin to be normal in turgor and texture, dry, and edematous  Wound exam: see wound description below in procedure note      Assessment:     Problem List Items Addressed This Visit          Circulatory    WD-Chronic venous hypertension (idiopathic) with ulcer of bilateral lower extremity (CODE) ([address] - Primary       Other    WD-Venous stasis ulcer of right calf with fat layer exposed ([address]    WD-Non-pressure chronic ulcer of other part of left lower leg limited to breakdown of skin ([address]    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 ([address]     Procedure Note    Indications:  Based on my examination of this patient's wound(s) today, sharp excision into necrotic subcutaneous tissue is required to promote healing and evaluate the extent of previous healing.     Performed by: MALLORY Barr - CNP    Consent obtained: Yes    Time out taken:  Yes    Pain Control: N/A      Debridement:Excisional Debridement    Using forceps the wound(s) was/were sharply debrided down through and including the removal of subcutaneous tissue.         Devitalized Tissue Debrided:  fibrin, biofilm, slough, and exudate    Pre Debridement Measurements:  Are located in the Wound Documentation Flow Sheet    All active wounds listed below with today's date are evaluated  Wound(s)    debrided this date include # : 1     Post  Debridement Measurements:  Wound 10/05/16 Venous ulcer WOUND #1 RT PROXIMAL LEG(ONSET 2 MTHS) (Active)   Number of days: 2226       Wound 10/05/16 Venous ulcer WOUND #2 RT LATERAL LEG CLUSTER (ONSET 2MTHS) (Active)   Number of days: 2226       Wound 10/05/16 Venous ulcer WOUND #3RT DISTAL LEG(ONSET 2 MTHS) (Active)   Number of days: 2226       Wound 10/05/16 Venous ulcer WOUND 4 RT MEDIAL LEG (ONSET 2 MTHS) (Active)   Number of days: 2226       Wound 11/09/22 Wound #1 Right Medial Lower leg (Active)   Wound Image   11/09/22 1532   Wound Etiology Venous 11/09/22 1532   Dressing Status New dressing applied 11/09/22 1559   Wound Cleansed Wound cleanser 11/09/22 1532   Wound Length (cm) 4 cm 11/09/22 1532   Wound Width (cm) 6.5 cm 11/09/22 1532   Wound Depth (cm) 0.1 cm 11/09/22 1532   Wound Surface Area (cm^2) 26 cm^2 11/09/22 1532   Wound Volume (cm^3) 2.6 cm^3 11/09/22 1532   Post-Procedure Length (cm) 4 cm 11/09/22 1558   Post-Procedure Width (cm) 6.5 cm 11/09/22 1558   Post-Procedure Depth (cm) 0.1 cm 11/09/22 1558   Post-Procedure Surface Area (cm^2) 26 cm^2 11/09/22 1558   Post-Procedure Volume (cm^3) 2.6 cm^3 11/09/22 1558   Distance Tunneling (cm) 0 cm 11/09/22 1532   Tunneling Position ___ O'Clock 0 11/09/22 1532   Undermining Starts ___ O'Clock 0 11/09/22 1532   Undermining Ends___ O'Clock 0 11/09/22 1532   Undermining Maxium Distance (cm) 0 11/09/22 1532   Wound Assessment Pink/red 11/09/22 1532   Drainage Amount Moderate 11/09/22 1532   Drainage Description Clear 11/09/22 1532   Odor None 11/09/22 1532   Kiki-wound Assessment Intact;Fragile 11/09/22 1532   Margins Attached edges 11/09/22 1532   Wound Thickness Description not for Pressure Injury Full thickness 11/09/22 1532   Number of days: 0       Percent of Wound(s) Debrided: approximately 100%    Total  Area  Debrided:  26 sq cm     Bleeding:  Minimal    Hemostasis Achieved:  by pressure    Procedural Pain:  0  / 10     Post Procedural Pain:  0 / 10     Response to treatment:  Well tolerated by patient. Status of wound progress and description from last visit:   Clean and stable. Patient agreeable to wrapping for the week  Hope to discharge in a week or 2       Plan:       Discharge Instructions         [address]     NOTE: Upon discharge from the [address], you will receive a patient experience survey. We would be grateful if you would take the time to fill this survey out. Wound care order history:                 HORTENSIA's   Right  1.36     Left 1.29            Date: 9/2/2022              Cultures:                Grafts:                Antibiotics:           Continuing wound care orders and information:                 Residence:  Private               Continue home health care with: None at this time               Your wound-care supplies will be provided by: Wound cleansing:               Do not scrub or use excessive force. Wash hands with soap and water before and after dressing changes. Prior to applying a clean dressing, cleanse wound with normal saline, wound cleanser, or mild soap and water. Ask the physician or nurse before getting the wound(s) wet in a shower     Daily Wound management:              Keep weight off wounds and reposition every 2 hours. Avoid standing for long periods of time. Apply wraps/stockings in AM and remove at bedtime. If swelling is present, elevate legs to the level of the heart or above for 30 minutes 4-5 times a day and/or when sitting.                                       When taking antibiotics take entire prescription as ordered by physician do not stop taking until medicine is all gone. Orders for this week: 11/9/22                  Venous studies ordered 9/2/22     Rx: [facility].     Bilateral Lower Extremities - Wash with soap and water, pat dry. Apply Gentamicin to any open wound beds. Cover wounds and seeping areas with Large Sorbex. Wrap with Coban 2 Lite. Leave in place for 1 week. Juxtalites ordered 9/16/22  Heel to knee measurement - 42cm        Follow up with Jacqueline Cerna CNP in 1 week in the wound care center. Call [phone] for any questions or concerns.   Date__________   Time____________        Treatment Note Wound 11/09/22 Wound #1 Right Medial Lower leg-Dressing/Treatment:  (Gentamicin, Sorbex, Coban 2 Lite)    Written Patient Dismissal Instructions Given            Electronically signed by MALLORY Salgado CNP on 11/9/2022 at 4:01 PM

## 2022-11-09 NOTE — DISCHARGE INSTRUCTIONS
PHYSICIAN ORDERS AND DISCHARGE INSTRUCTIONS     NOTE: Upon discharge from the [address], you will receive a patient experience survey. We would be grateful if you would take the time to fill this survey out. Wound care order history:                 HORTENSIA's   Right  1.36     Left 1.29            Date: 9/2/2022              Cultures:                Grafts:                Antibiotics:           Continuing wound care orders and information:                 Residence:  Private               Continue home health care with: None at this time               Your wound-care supplies will be provided by: Wound cleansing:               Do not scrub or use excessive force. Wash hands with soap and water before and after dressing changes. Prior to applying a clean dressing, cleanse wound with normal saline, wound cleanser, or mild soap and water. Ask the physician or nurse before getting the wound(s) wet in a shower     Daily Wound management:              Keep weight off wounds and reposition every 2 hours. Avoid standing for long periods of time. Apply wraps/stockings in AM and remove at bedtime. If swelling is present, elevate legs to the level of the heart or above for 30 minutes 4-5 times a day and/or when sitting. When taking antibiotics take entire prescription as ordered by physician do not stop taking until medicine is all gone. Orders for this week: 11/9/22                  Venous studies ordered 9/2/22     Rx: [facility].     Bilateral Lower Extremities - Wash with soap and water, pat dry. Apply Gentamicin to any open wound beds. Cover wounds and seeping areas with Large Sorbex. Wrap with Coban 2 Lite. Leave in place for 1 week.      Juxtalites ordered 9/16/22  Heel to knee measurement - 42cm        Follow up with Yolanda Sy CNP in 1 week in the wound care center. Call [phone] for any questions or concerns.   Date__________   Time____________

## 2022-11-16 ENCOUNTER — HOSPITAL ENCOUNTER (OUTPATIENT)
Dept: WOUND CARE | Age: 82
Discharge: HOME | End: 2022-11-16
Payer: MEDICARE

## 2022-11-16 VITALS
DIASTOLIC BLOOD PRESSURE: 75 MMHG | SYSTOLIC BLOOD PRESSURE: 159 MMHG | HEART RATE: 74 BPM | TEMPERATURE: 99.1 F | RESPIRATION RATE: 20 BRPM

## 2022-11-16 DIAGNOSIS — I83.012 VENOUS STASIS ULCER OF RIGHT CALF WITH FAT LAYER EXPOSED, UNSPECIFIED WHETHER VARICOSE VEINS PRESENT (HCC): Primary | ICD-10-CM

## 2022-11-16 DIAGNOSIS — I87.8 VENOUS STASIS: ICD-10-CM

## 2022-11-16 DIAGNOSIS — L97.212 VENOUS STASIS ULCER OF RIGHT CALF WITH FAT LAYER EXPOSED, UNSPECIFIED WHETHER VARICOSE VEINS PRESENT (HCC): Primary | ICD-10-CM

## 2022-11-16 PROCEDURE — 11045 DBRDMT SUBQ TISS EACH ADDL: CPT

## 2022-11-16 PROCEDURE — 11042 DBRDMT SUBQ TIS 1ST 20SQCM/<: CPT

## 2022-11-16 PROCEDURE — 11042 DBRDMT SUBQ TIS 1ST 20SQCM/<: CPT | Performed by: NURSE PRACTITIONER

## 2022-11-16 PROCEDURE — 11045 DBRDMT SUBQ TISS EACH ADDL: CPT | Performed by: NURSE PRACTITIONER

## 2022-11-16 NOTE — PROGRESS NOTES
Wound Care Center Progress Note With Procedure    Clifford Fournier  AGE: 79 y.o. GENDER: male  : 1940  EPISODE DATE:  2022     Subjective:     Chief Complaint   Patient presents with    Wound Check     RLE         HISTORY of PRESENT ILLNESS      Clifford Fournier is a 79 y.o. male who presents today for wound evaluation of Acute venous ulcer(s) of the right lower extremity . The ulcer is of moderate severity. The underlying cause of the wound is venous hypertensions. Patient is known to us and was discharged about a month ago with juxta-lites. Was doing well but developed a large blister and wanted to be seen quickly. Much improved and blister is nearly healed  Macerated and patient states he had excess drainage this week and this began to irritate him  Will need to modify orders and he can call if this needs changed   Great benefit from compression on right lower extremity      Patient educated on the 6 essential components necessary for wound healing: Circulation, Debridements, Proper Dressings and Topical Wound Products, Infection Control, Edema Control and Offloading. Patient educated on those factors that negatively effect or impact wound healing: smoking, obesity, uncontrolled diabetes, anticoagulant and immunosuppressive regimens, inadequate nutrition, untreated arterial and venous disease if applicable and measures to manage edema. Off Loading  Offloading or minimizing or removing weight placed on an area with poor circulation such as diabetic wounds or pressure.  This can be achieved with crutches, wheel chair, knee walker etc. Minimizing pressure through partial weight bearing (minimizing the amount of  pressure applied and or the amount of time on the area of pressure) or maintaining a non-weight bearing status can be used to promote and often can be essential for thee wound to heal. Off loading may also need to be achieved for non-weight bearing wounds such as pressure ulcers to the torso. Turning and changing positions frequently, at least every two hours. Use of pressure cushion if sitting up in chair. Skin Care  Keep skin clean and well moisturized , moisturize routinely with ointments for heavier moisturizer needs for extremely dry skin or cracks such as A&D ointment and lotions for a light moisturizer such as CeraVe or Eucerin. If incontinent change incontinence garments as soon as soiled and keeping skin clean and use barrier cream to protect the skin. Reduce Salt and Sodium  Choose low- or reduced- sodium, or no-salt-added versions of foods and condiments when available. Buy fresh, plain frozen, or canned with no-added-salt vegetables. Use fresh poultry, fish and lean meat, rather than canned, smoked or processed types. Choose ready-to-eat breakfast cereals that are lower in sodium. Limit cured foods (such as ford and ham), foods packed in brine (such as pickled foods) and condiments (such as MSG, mustard, horseradish, and catsup). Limit even lower sodium versions of soy sauce and teriyaki sauce-treat these condiments just like salt). In cooking and at the table, flavor foods with herbs, spices, lemon, lime, vinegar or salt-free seasoning blends. Start by cutting salt in half. Cook rice, pasta and hot cereals without salt. Cut back on instant or flavored rice, pasta and cereal mixes, which usually have added salt. Choose convenience foods that are lower in sodium. Limit frozen dinners, packaged mixes, canned soups and dressings. Rinse canned foods, such as tuna, to remove some sodium. Choose fruits or vegetables instead of salty snack foods. Edema Management if applicable  Whenever resting, raise your legs up. Try to keep the swollen area higher than the level of your heart. Take breaks from standing or sitting in one position. Walk around to increase the blood flow in your lower legs.  Move your feet and ankles often while you stand, or tighten and relax your leg muscles. Wear support stockings. Put them on in the morning, before swelling gets worse. Eat a balanced diet. Lose weight if you need to. Limit the amount of salt (sodium) in your diet. Salt holds fluid in the body and may increase swelling. Apply compression stocking(s) every morning as soon as you get up. Remove at bedtime unless instructed to wear day and night. Hand wash and line dry to prevent loss of elasticity. Replace every 3-4 months to ensure proper fit. Weight Management if Applicable  Will need to ultimately change overall eating behaviors to have success with weight loss. Encouraged to weigh daily and work towards a goal of 1-2 pounds of weight loss weekly. Encouraged to journal all food intake, myfitness pal is a useful tool to help keep track of food intake and caloric value. Keep calorie level at approximately 0734-2557. Protein intake is to be a minimum of 60 grams per day (unless otherwise directed). Water drinking was encouraged with a goal of 64oz-128oz daily. Beverages to be calorie free except for milk. Every other beverage should be water, avoid soda. Continue to increase level of physical activity.  Refer to weight management as indicated and       Wound Pain Timing/Severity: waxing and waning  Quality of pain: aching, tender  Severity of pain:  1 / 10   Modifying Factors: edema, venous stasis, and obesity  Associated Signs/Symptoms: edema, erythema, drainage, and pain        PAST MEDICAL HISTORY        Diagnosis Date    Aortic regurgitation 04/03/2012    4/3/2012-mild; 2/7/2011-mild to moderate    Arrhythmia 02/22/2011 2/22/2011-tachycardia episode during cardiac cath-48 HR Holter monitor normal    Blisters of multiple sites 10/05/2016    CAD (coronary artery disease)     sees Dr Ava Kimbrough, prior Dr Branden Barahona;    Cardiac pacemaker 04/2001 4/2001-[employer] PPM Integrity DDD Model 5342    Congestive heart failure [facility])     Family history of coronary artery disease     Father-MI    GERD (gastroesophageal reflux disease)     H/O cardiac catheterization 5/2/2012, 2/22/2011, 3/4/2010, 2/2001 5/2/2012-[facility]-No evid of signif CAD; Normal peripheral angiogram; 2/22/2011-No signif CAD, stent patent, had tachycardic event; 3/4/2010-Severe stenosis and re in-stent stenosis of LAD    H/O cardiovascular stress test 4/16/2012, 2/7/2011 4/16/2012-Normal Lexiscan study. No evid of ischemia. Attenuation artifact in inferior region of myocardium. EF 58%. H/O cardiovascular stress test 03/04/2015    normal, no ischemia, EF62%    H/O cardiovascular stress test 12/06/2017    EF60% normal study    H/O Doppler ultrasound 02/07/2011 2/7/2011-CAROTID US-Normal    H/O Doppler ultrasound 05/24/2013    periphal normal    H/O Doppler ultrasound 06/18/2014 6/14 Normal    H/O Doppler ultrasound 11/13/15 09/03/15    11/15 WNL 9/15Arterial and Venous Doppler is normal.     H/O echocardiogram 03/04/2015    EF60% Normal LV and systolic function. Trace MR, Aortic, and TR. No other valvulopathy.      H/O echocardiogram 08/08/2016    EF60% mild AR, mild phtn    H/O echocardiogram 11/01/2017    EF55-60% sclerotic, non stenotic AV, mild AR, phtn    H/O peptic ulcer 1970s    Hiatal hernia     History of cardiac monitoring 06/01/2015    Event - Patient noted to have recurrence of atrial fibrillation with RVR on medical therapy on event monitor    History of Doppler carotid echocardiogram 01/15/2020    No hemodynamically significant stenosis in the internal carotid artery bilaterally, Right internal carotid artery exhibits mild homogenous plaque, Left internal exhibits minimal homogenous plaque, totuosity of the bilateral internal carotid arteries    History of Holter monitoring 05/19/2015    24 hour - predominant rhythm was sinus intermittent paced, paced rhythm can not be ruled out d/t pacemaker function being turned off during device setup, limited pacemaker data available    History of nuclear stress test 11/10/2016 lexiscan-normal,EF70%    HX OTHER MEDICAL 05/02/2012 5/2/2012-PERIPHERAL ANGIOGRAM-Normal-Dr Amirah Odonnell OTHER MEDICAL 03/02/2011    3/2/2011-48 HR HOLTER MONITOR-Pred Rhythm is sinus rhythm. No arrhythmias. Hyperlipidemia     Hypertension     Nuclear Stress 07/07/2021    EF 53%.     S/P angioplasty with stent 3/4/2010, 2/2001    3/4/2010-PTCA with 2.75 X 38 mm stent to LAD; 2/2001-PTCA with stent to LAD    S/P cardiac pacemaker procedure 06/04/2013    battery replacement    Sleep apnea     has CPAP    WD-Chronic venous hypertension (idiopathic) with ulcer of bilateral lower extremity (CODE) ([facility] [address] 2/18/2022    WD-Non-pressure chronic ulcer of other part of left lower leg limited to breakdown of skin ([address] 2/18/2022    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 ([address] 2/18/2022       PAST SURGICAL HISTORY    Past Surgical History:   Procedure Laterality Date    ATRIAL ABLATION SURGERY  8/11/15    Atrial fib/flutter ablation     CARDIAC CATHETERIZATION  06/14/2015    no significant disease    CARDIAC PACEMAKER PLACEMENT  4/2001 4/2001-[employer] PPM Integrity DDD Model 5342    CARPAL TUNNEL RELEASE Bilateral 1976    CORONARY ANGIOPLASTY WITH STENT PLACEMENT  3/4/2010, 2/2001    3/4/2010-PTCA with 2.75 X 38 mm stent to LAD; 2/2001-PTCA with stent to LAD;    DIAGNOSTIC CARDIAC CATH LAB PROCEDURE  5/2/2012,2/22/2011, 3/4/2010, 2/2001 5/2/2012-[facility]-No evid of signif CAD; Normal peripheral angiogram; 2/22/2011-No signif CAD, stent patent, had tachycardic event; 3/4/2010-Severe stenosis and re in-stent stenosis of LAD    FINGER TRIGGER RELEASE Left 1994    thumb    HAND TENDON SURGERY Left 1976    Left middle finger    HERNIA REPAIR Bilateral 1993    bilateral inguinal hernias    JOINT REPLACEMENT      knees bilaterally-Right 10/2009; 1/2008-Left    KNEE ARTHROSCOPY  11/2005, 1989 11/2005, 1989-Right knee    LUMBAR DISC ARTHROPLASTY  07/08/2016    OTHER SURGICAL HISTORY 5/2/2012 5/2/2012-PERIPHERAL ANGIOGRAM-Normal    UPPER GASTROINTESTINAL ENDOSCOPY  5/3/2001    5/3/7585-[facility])       FAMILY HISTORY    Family History   Problem Relation Age of Onset    Heart Disease Mother     High Blood Pressure Mother     Stroke Father     Coronary Art Dis Father         MI    Other Brother         accidental    Brain Cancer Paternal Grandfather        SOCIAL HISTORY    Social History     Tobacco Use    Smoking status: Never    Smokeless tobacco: Never   Vaping Use    Vaping Use: Never used   Substance Use Topics    Alcohol use: No     Alcohol/week: 0.0 standard drinks    Drug use: No       ALLERGIES    Allergies   Allergen Reactions    Ceclor [Cefaclor] Rash    Celebrex [Celecoxib] Rash    Pcn [Penicillins] Rash    Relafen [Nabumetone] Rash       MEDICATIONS    Current Outpatient Medications on File Prior to Encounter   Medication Sig Dispense Refill    torsemide (DEMADEX) 20 MG tablet Take 1 tablet by mouth in the morning and at bedtime 60 tablet 3    amLODIPine (NORVASC) 5 MG tablet       TRAZODONE HCL PO Take by mouth      DULoxetine (CYMBALTA) 30 MG extended release capsule Take 30 mg by mouth daily      ferrous sulfate (IRON 325) 325 (65 Fe) MG tablet Take 325 mg by mouth daily      potassium chloride (KLOR-CON M) 20 MEQ extended release tablet Take 1 tablet by mouth daily      diclofenac-miSOPROStol (ARTHROTEC 75) 75-0.2 MG per tablet Take 1 tablet by mouth 2 times daily      tiZANidine (ZANAFLEX) 4 MG tablet TAKE 1/2 TABLET BY MOUTH THREE TIMES A DAY FOR 2 DAYS, THEN TAKE 1 TABLET BY MOUTH THREE TIMES A DAY AFTER  2    losartan (COZAAR) 50 MG tablet Take 50 mg by mouth daily       dexlansoprazole (DEXILANT) 60 MG CPDR delayed release capsule Take 60 mg by mouth daily      zolpidem (AMBIEN) 10 MG tablet Take by mouth nightly as needed for Sleep      dronedarone hcl (MULTAQ) 400 MG TABS Take 400 mg by mouth 2 times daily (with meals)      nitroGLYCERIN (NITROLINGUAL) 0.4 MG/SPRAY 0.4 mg spray Place 1 spray under the tongue every 5 minutes as needed for Chest pain      CPAP Machine MISC by Does not apply route nightly      apixaban (ELIQUIS) 5 MG TABS tablet Take 1 tablet by mouth 2 times daily 60 tablet 3    rosuvastatin (CRESTOR) 20 MG tablet Take 20 mg by mouth daily. pramipexole (MIRAPEX) 1 MG tablet   Take 0.5 mg by mouth 2 times daily Patient takes 1/2 tablet in the am and 1 1/2 tablet at night. metoprolol (LOPRESSOR) 50 MG tablet Take 50 mg by mouth 2 times daily. modafinil (PROVIGIL) 100 MG tablet Take 100 mg by mouth daily. esomeprazole (NEXIUM) 40 MG capsule Take 40 mg by mouth 2 times daily. aspirin 81 MG chewable tablet Take 81 mg by mouth daily. No current facility-administered medications on file prior to encounter. REVIEW OF SYSTEMS    Pertinent items are noted in HPI. Constitutional: Negative for systemic symptoms including fever, chills and malaise. Objective:      BP (!) 159/75   Pulse 74   Temp 99.1 °F (37.3 °C)   Resp 20     PHYSICAL EXAM      General: The patient is in no acute distress. Mental status:  Patient is appropriate, is  oriented to place and plan of care. Dermatologic exam: Visual inspection of the periwound reveals the skin to be normal in turgor and texture, dry, and edematous  Wound exam: see wound description below in procedure note      Assessment:     Problem List Items Addressed This Visit          Circulatory    Venous stasis       Other    WD-Venous stasis ulcer of right calf with fat layer exposed ([address] - Primary     Procedure Note    Indications:  Based on my examination of this patient's wound(s) today, sharp excision into necrotic subcutaneous tissue is required to promote healing and evaluate the extent of previous healing.     Performed by: MALLORY Claros - CNP    Consent obtained: Yes    Time out taken:  Yes    Pain Control: N/A Debridement:Excisional Debridement    Using curette and forceps the wound(s) was/were sharply debrided down through and including the removal of subcutaneous tissue.         Devitalized Tissue Debrided:  fibrin, biofilm, slough, and exudate    Pre Debridement Measurements:  Are located in the Wound Documentation Flow Sheet    All active wounds listed below with today's date are evaluated  Wound(s)    debrided this date include # : 1     Post  Debridement Measurements:  Wound 10/05/16 Venous ulcer WOUND #1 RT PROXIMAL LEG(ONSET 2 MTHS) (Active)   Number of days: 2233       Wound 10/05/16 Venous ulcer WOUND #2 RT LATERAL LEG CLUSTER (ONSET 2MTHS) (Active)   Number of days: 2233       Wound 10/05/16 Venous ulcer WOUND #3RT DISTAL LEG(ONSET 2 MTHS) (Active)   Number of days: 2233       Wound 10/05/16 Venous ulcer WOUND 4 RT MEDIAL LEG (ONSET 2 MTHS) (Active)   Number of days: 2233       Wound 11/09/22 Wound #1 Right Medial Lower leg (Active)   Wound Image   11/09/22 1532   Wound Etiology Venous 11/16/22 1527   Dressing Status New dressing applied 11/16/22 1558   Wound Cleansed Soap and water 11/16/22 1527   Offloading for Diabetic Foot Ulcers Offloading not required 11/16/22 1558   Wound Length (cm) 3.8 cm 11/16/22 1527   Wound Width (cm) 5.7 cm 11/16/22 1527   Wound Depth (cm) 0.1 cm 11/16/22 1527   Wound Surface Area (cm^2) 21.66 cm^2 11/16/22 1527   Change in Wound Size % (l*w) 16.69 11/16/22 1527   Wound Volume (cm^3) 2.166 cm^3 11/16/22 1527   Wound Healing % 17 11/16/22 1527   Post-Procedure Length (cm) 3.8 cm 11/16/22 1546   Post-Procedure Width (cm) 5.7 cm 11/16/22 1546   Post-Procedure Depth (cm) 0.1 cm 11/16/22 1546   Post-Procedure Surface Area (cm^2) 21.66 cm^2 11/16/22 1546   Post-Procedure Volume (cm^3) 2.166 cm^3 11/16/22 1546   Distance Tunneling (cm) 0 cm 11/16/22 1527   Tunneling Position ___ O'Clock 0 11/16/22 1527   Undermining Starts ___ O'Clock 0 11/16/22 1527   Undermining Ends___ O'Clock 0 11/16/22 1527   Undermining Maxium Distance (cm) 0 11/16/22 1527   Wound Assessment Pink/red 11/16/22 1527   Drainage Amount Copious 11/16/22 1527   Drainage Description Clear;Yellow 11/16/22 1527   Odor None 11/16/22 1527   Kiki-wound Assessment Maceration;Fragile 11/16/22 1527   Margins Defined edges 11/16/22 1527   Wound Thickness Description not for Pressure Injury Full thickness 11/16/22 1527   Number of days: 7       Percent of Wound(s) Debrided: approximately 100%    Total  Area  Debrided:  21.66 sq cm     Bleeding:  Minimal    Hemostasis Achieved:  by pressure    Procedural Pain:  0  / 10     Post Procedural Pain:  0 / 10     Response to treatment:  Well tolerated by patient. Status of wound progress and description from last visit:   Wound nearly healed  But need to wrap this week to control drainage. Follow up 1 week and updates to plan of care made       Plan:       Discharge Instructions         [address]     NOTE: Upon discharge from the [address], you will receive a patient experience survey. We would be grateful if you would take the time to fill this survey out. Wound care order history:                 HORTENSIA's   Right  1.36     Left 1.29            Date: 9/2/2022              Cultures:                Grafts:                Antibiotics:           Continuing wound care orders and information:                 Residence:  Private               Continue home health care with: None at this time               Your wound-care supplies will be provided by: Wound cleansing:               Do not scrub or use excessive force. Wash hands with soap and water before and after dressing changes. Prior to applying a clean dressing, cleanse wound with normal saline, wound cleanser, or mild soap and water.                Ask the physician or nurse before getting the wound(s) wet in a shower     Daily Wound management:              Keep weight off wounds and reposition every 2 hours. Avoid standing for long periods of time. Apply wraps/stockings in AM and remove at bedtime. If swelling is present, elevate legs to the level of the heart or above for 30 minutes 4-5 times a day and/or when sitting. When taking antibiotics take entire prescription as ordered by physician do not stop taking until medicine is all gone. Orders for this week: 11/16/22                  Venous studies ordered 9/2/22     [facility] [address] [facility]     Right Lower Extremity - Wash with soap and water, pat dry. Cover wounds and seeping areas with Ioplex, ABD and Large Sorbex. Wrap with Coban 2. Leave in place for 1 week. Juxtalites ordered 9/16/22  Heel to knee measurement - 42cm        Follow up with Enrique Habermann, CNP in 1 week in the wound care center. Call [phone] for any questions or concerns.   Date__________   Time____________        Treatment Note Wound 11/09/22 Wound #1 Right Medial Lower leg-Dressing/Treatment:  (ioplex ABd large sorbex coban 2)    Written Patient Dismissal Instructions Given            Electronically signed by Marylene Hong, APRN - CNP on 11/16/2022 at 4:09 PM

## 2022-11-16 NOTE — PROGRESS NOTES
Multilayer Compression Wrap   (Not Unna) Below the Knee    NAME:  Aleksandra Market  YOB: 1940  MEDICAL RECORD NUMBER:  [id number]  DATE:  11/16/2022    Multilayer compression wrap: Removed old Multilayer wrap if indicated and wash leg with mild soap/water. Applied moisturizing agent to dry skin as needed. Applied primary and secondary dressing as ordered. Applied multilayered dressing below the knee to right lower leg. Instructed patient/caregiver not to remove dressing and to keep it clean and dry. Instructed patient/caregiver on complications to report to provider, such as pain, numbness in toes, heavy drainage, and slippage of dressing. Instructed patient on purpose of compression dressing and on activity and exercise recommendations.       Electronically signed by Van Desir LPN on 37/95/6554 at 4:00 PM

## 2022-11-16 NOTE — DISCHARGE INSTRUCTIONS
PHYSICIAN ORDERS AND DISCHARGE INSTRUCTIONS     NOTE: Upon discharge from the [address], you will receive a patient experience survey. We would be grateful if you would take the time to fill this survey out. Wound care order history:                 HORTENSIA's   Right  1.36     Left 1.29            Date: 9/2/2022              Cultures:                Grafts:                Antibiotics:           Continuing wound care orders and information:                 Residence:  Private               Continue home health care with: None at this time               Your wound-care supplies will be provided by: Wound cleansing:               Do not scrub or use excessive force. Wash hands with soap and water before and after dressing changes. Prior to applying a clean dressing, cleanse wound with normal saline, wound cleanser, or mild soap and water. Ask the physician or nurse before getting the wound(s) wet in a shower     Daily Wound management:              Keep weight off wounds and reposition every 2 hours. Avoid standing for long periods of time. Apply wraps/stockings in AM and remove at bedtime. If swelling is present, elevate legs to the level of the heart or above for 30 minutes 4-5 times a day and/or when sitting. When taking antibiotics take entire prescription as ordered by physician do not stop taking until medicine is all gone. Orders for this week: 11/16/22                  Venous studies ordered 9/2/22     Rx: [facility]     Right Lower Extremity - Wash with soap and water, pat dry. Cover wounds and seeping areas with Ioplex, ABD and Large Sorbex. Wrap with Coban 2. Leave in place for 1 week.      Juxtalites ordered 9/16/22  Heel to knee measurement - 42cm        Follow up with Yolanda Sy CNP in 1 week in the wound care [place]. Call [phone] for any questions or concerns.   Date__________   Time____________

## 2022-11-22 ENCOUNTER — INITIAL CONSULT (OUTPATIENT)
Dept: CARDIOLOGY CLINIC | Age: 82
End: 2022-11-22
Payer: MEDICARE

## 2022-11-22 VITALS
HEART RATE: 80 BPM | BODY MASS INDEX: 35.68 KG/M2 | DIASTOLIC BLOOD PRESSURE: 82 MMHG | HEIGHT: 66 IN | SYSTOLIC BLOOD PRESSURE: 122 MMHG | WEIGHT: 222 LBS

## 2022-11-22 DIAGNOSIS — E66.9 OBESITY (BMI 35.0-39.9 WITHOUT COMORBIDITY): ICD-10-CM

## 2022-11-22 DIAGNOSIS — I49.5 SSS (SICK SINUS SYNDROME) (HCC): ICD-10-CM

## 2022-11-22 DIAGNOSIS — G47.33 OSA TREATED WITH BIPAP: ICD-10-CM

## 2022-11-22 DIAGNOSIS — E78.2 MIXED HYPERLIPIDEMIA: ICD-10-CM

## 2022-11-22 DIAGNOSIS — I10 PRIMARY HYPERTENSION: ICD-10-CM

## 2022-11-22 DIAGNOSIS — Z98.61 HISTORY OF PTCA: ICD-10-CM

## 2022-11-22 DIAGNOSIS — I25.118 CORONARY ARTERY DISEASE OF NATIVE HEART WITH STABLE ANGINA PECTORIS, UNSPECIFIED VESSEL OR LESION TYPE (HCC): ICD-10-CM

## 2022-11-22 DIAGNOSIS — Z95.0 PACEMAKER: ICD-10-CM

## 2022-11-22 DIAGNOSIS — I48.0 PAROXYSMAL ATRIAL FIBRILLATION (HCC): ICD-10-CM

## 2022-11-22 DIAGNOSIS — I83.893 VARICOSE VEINS OF BOTH LEGS WITH EDEMA: Primary | ICD-10-CM

## 2022-11-22 PROCEDURE — 3078F DIAST BP <80 MM HG: CPT | Performed by: INTERNAL MEDICINE

## 2022-11-22 PROCEDURE — 1123F ACP DISCUSS/DSCN MKR DOCD: CPT | Performed by: INTERNAL MEDICINE

## 2022-11-22 PROCEDURE — 3074F SYST BP LT 130 MM HG: CPT | Performed by: INTERNAL MEDICINE

## 2022-11-22 PROCEDURE — 99204 OFFICE O/P NEW MOD 45 MIN: CPT | Performed by: INTERNAL MEDICINE

## 2022-11-22 NOTE — LETTER
[address]  [address] [address]  Phone: [phone]  Fax: [phone]    Sonu Barrett MD    November 22, 2022     Kaia Oconnell, [address] [place] [address]    Patient: Franco Smyth   MR Number: [id number]   YOB: 1940   Date of Visit: 11/22/2022       Dear Kaia Oconnell: Thank you for referring Evelinita Lover to me for evaluation/treatment. Below are the relevant portions of my assessment and plan of care. If you have questions, please do not hesitate to call me. I look forward to following [place] along with you.     Sincerely,      Sonu Barrett MD

## 2022-11-22 NOTE — PROGRESS NOTES
CARDIAC CONSULT NOTE       Bee Jones  79 y.o.  male    Chief Complaint   Patient presents with    Edema         Referring physician:  Jacey Yun MD     Primary care physician:  Jacey Yun MD    History of Present Illness:     Bee Jones is a 79 y.o. male referred for evaluation and management of CVI. Patient C/O Leg edema, skin discoloration,active right Leg wound, restless,      has a past medical history of Aortic regurgitation, Arrhythmia, Blisters of multiple sites, CAD (coronary artery disease), Cardiac pacemaker, Congestive heart failure ([address] Family history of coronary artery disease, GERD (gastroesophageal reflux disease), H/O cardiac catheterization, H/O cardiovascular stress test, H/O cardiovascular stress test, H/O cardiovascular stress test, H/O Doppler ultrasound, H/O Doppler ultrasound, H/O Doppler ultrasound, H/O Doppler ultrasound, H/O echocardiogram, H/O echocardiogram, H/O echocardiogram, H/O peptic ulcer, Hiatal hernia, History of cardiac monitoring, History of Doppler carotid echocardiogram, History of Holter monitoring, History of nuclear stress test, HX OTHER MEDICAL, HX OTHER MEDICAL, Hyperlipidemia, Hypertension, Nuclear Stress, S/P angioplasty with stent, S/P cardiac pacemaker procedure, Sleep apnea, WD-Chronic venous hypertension (idiopathic) with ulcer of bilateral lower extremity (CODE) ([address] 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 has a past surgical history that includes Coronary angioplasty with stent (3/4/2010, 2/2001); joint replacement; hernia repair (Bilateral, 1993); Carpal tunnel release (Bilateral, 1976); Cardiac pacemaker placement (4/2001); Hand tendon surgery (Left, 1976); Knee arthroscopy (11/2005, 1989); other surgical history (5/2/2012);  Upper gastrointestinal endoscopy (5/3/2001); Finger trigger release (Left, 1994); Diagnostic Cardiac Cath Lab Procedure (5/2/2012,2/22/2011, 3/4/2010, 2/2001); Cardiac catheterization (06/14/2015); Atrial ablation surgery (8/11/15); and Lumbar disc arthroplasty (07/08/2016). reports that he has never smoked. He has never used smokeless tobacco. He reports that he does not drink alcohol and does not use drugs. family history includes Brain Cancer in his paternal grandfather; Coronary Art Dis in his father; Heart Disease in his mother; High Blood Pressure in his mother; Other in his brother; Stroke in his father. Review of Systems:   Cardiovascular: No chest pain, dyspnea on exertion, palpitations or loss of consciousness  Respiratory: No cough or wheezing    Musculoskeletal:  No gait disturbance, weakness, muscle cramps, aches & pains or joint complaints  Neurological: No TIA or stroke symptoms  Psychiatric: No anxiety or depression  Hematologic/Lymphatic: No bleeding problems, blood clots or swollen lymph nodes    Physical Examination:    /82 (Site: Left Upper Arm, Position: Sitting, Cuff Size: Large Adult)   Pulse 80   Ht 5' 6\" (1.676 m)   Wt 222 lb (100.7 kg)   BMI 35.83 kg/m²    Wt Readings from Last 3 Encounters:   11/22/22 222 lb (100.7 kg)   10/03/22 220 lb 6.4 oz (100 kg)   08/16/22 223 lb 6.4 oz (101.3 kg)     Body mass index is 35.83 kg/m². General Appearance: Obese/Well Nourished  1. Skin: It is warm & dry. No rashes noted. 2. Eyes: No conjunctival Pallor seen. No jaundice noted. 3. Neck: is supple there is no elevation of JVD. No thyromegaly  4. Respiratory:  Resp Assessment: No abnormal findings. Resp Auscultation: Vesicular breath sounds without rales or wheezing. 5. Cardiovascular: Auscultation: Normal S1 & S2, No prominent murmurs  Carotid Arteries: No bruits present  Abdominal Aorta: Non-palpable  Pedal Pulses: 2+ and equal   6.  Abdomen:  No masses or tenderness  Liver/Spleen: No Abnormalities Noted, no organomegaly. 7. Musculoskeletal: No joint deformities. No muscle wasting  8. Extremities:   No Cyanosis or Clubbing. There is significant edema with C4 changes. 9. Rectal / genital:   Deferred  10.  Neurological/Psychiatric:  Oriented to time, place, and person  Non-anxious    Lab Results   Component Value Date/Time    CKTOTAL 297 05/01/2012 04:45 AM    CKTOTAL 318 05/01/2012 01:40 AM    CKTOTAL 315 05/01/2012 01:40 AM    CKMB 7.2 05/01/2012 04:45 AM    CKMB 7.6 05/01/2012 01:40 AM    TROPONINT 0.017 06/11/2015 12:13 PM    TROPONINT 0.024 06/11/2015 04:09 AM     BNP:    Lab Results   Component Value Date/Time    BNP 43 12/22/2011 11:00 AM    PROBNP 121.4 12/29/2016 12:05 PM    PROBNP 114.0 01/04/2016 12:37 PM     PT/INR:  No results found for: PTINR  No results found for: LABA1C  Lab Results   Component Value Date    CHOL 121 03/24/2017    CHOL 125 02/02/2016    TRIG 72 03/24/2017    TRIG 67 02/02/2016    HDL 48 03/24/2017    HDL 51 02/02/2016    LDLDIRECT 67 03/24/2017    LDLDIRECT 65 02/02/2016     Lab Results   Component Value Date    ALT 20 11/24/2019    ALT 26 03/24/2017    AST 22 11/24/2019    AST 29 03/24/2017     BMP:    Lab Results   Component Value Date/Time     09/06/2022 01:41 PM     11/24/2019 01:41 PM    K 3.9 09/06/2022 01:41 PM    K 3.5 11/24/2019 01:41 PM     09/06/2022 01:41 PM    CL 96 11/24/2019 01:41 PM    CO2 30 09/06/2022 01:41 PM    CO2 27 11/24/2019 01:41 PM    BUN 27 09/06/2022 01:41 PM    BUN 15 11/24/2019 01:41 PM    CREATININE 1.5 09/06/2022 01:41 PM    CREATININE 1.2 11/24/2019 01:41 PM     CMP:    Lab Results   Component Value Date/Time     09/06/2022 01:41 PM     11/24/2019 01:41 PM    K 3.9 09/06/2022 01:41 PM    K 3.5 11/24/2019 01:41 PM     09/06/2022 01:41 PM    CL 96 11/24/2019 01:41 PM    CO2 30 09/06/2022 01:41 PM    CO2 27 11/24/2019 01:41 PM    BUN 27 09/06/2022 01:41 PM    BUN 15 11/24/2019 01:41 PM    CREATININE 1.5 09/06/2022 01:41 PM CREATININE 1.2 11/24/2019 01:41 PM    PROT 6.6 11/24/2019 01:41 PM    PROT 6.9 03/24/2017 10:27 AM    PROT 7.2 05/01/2012 01:40 AM    PROT 7.4 12/22/2011 11:00 AM     TSH:    Lab Results   Component Value Date/Time    TSHHS 0.925 03/24/2017 10:27 AM     10/3/2022   No evidence of DVT or SVT in the bilateral common femoral vein, femoral    vein, popliteal vein, greater saphenous vein or right small saphenous vein. 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 Significant reflux noted of the Right GSV SFJ (0.8s), and GSV Knee (2.4s). Significant reflux noted in the Left CFV (2.6s), Popliteal V (1.3s), and SSV    Proximal (3.2s). QUALITY MEASURES REVIEWED:  1.CAD:Patient is taking anti platelet agent:Yes  Patient does not have Hx of documented CAD  2. DYSLIPIDEMIA: Patient is on cholesterol lowering medication:Yes   3. Beta-Blocker therapy for CAD, if prior Myocardial Infarction:Yes  4. Counselled regarding smoking cessation. No   Patient does not Smoke. 5.Anticoagulation therapy (for A.Fib) Yes   Does Not have A.Fib.  6.Discussed weight management strategies. Assessment, Recommendations & Tests:     CVI: Patient has symptomatic C4 venous disease. I spent about 30 min. of time in review of the available data, chart Prep., interviewing patient, obtaining history, performing physical exam, going through decision making analysis for assessment & plans of management on this patient. Office Visit for test results.      Rosy Finnegan MD, 11/22/2022 4:16 PM

## 2022-11-22 NOTE — PROGRESS NOTES
CARDIAC CONSULT NOTE       Thompson Sanchez  79 y.o.  male    Chief Complaint   Patient presents with    Edema         Referring physician:  Deven Sellers MD     Primary care physician:  Deven Sellers MD    History of Present Illness:     Thompson Sanchez is a 79 y.o. male referred for evaluation and management of CVI. Patient C/O Leg edema, skin discoloration, right leg ulcer, restless, tired & weak Legs. Has had problem for > 1 year. Recently getting worse. He is in wound clinic. Has been wearing stockings. Does not smoke. has a past medical history of Aortic regurgitation, Arrhythmia, Blisters of multiple sites, CAD (coronary artery disease), Cardiac pacemaker, Congestive heart failure ([address] Family history of coronary artery disease, GERD (gastroesophageal reflux disease), H/O cardiac catheterization, H/O cardiovascular stress test, H/O cardiovascular stress test, H/O cardiovascular stress test, H/O Doppler ultrasound, H/O Doppler ultrasound, H/O Doppler ultrasound, H/O Doppler ultrasound, H/O echocardiogram, H/O echocardiogram, H/O echocardiogram, H/O peptic ulcer, Hiatal hernia, History of cardiac monitoring, History of Doppler carotid echocardiogram, History of Holter monitoring, History of nuclear stress test, HX OTHER MEDICAL, HX OTHER MEDICAL, Hyperlipidemia, Hypertension, Nuclear Stress, S/P angioplasty with stent, S/P cardiac pacemaker procedure, Sleep apnea, WD-Chronic venous hypertension (idiopathic) with ulcer of bilateral lower extremity (CODE) ([address] 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 has a past surgical history that includes Coronary angioplasty with stent (3/4/2010, 2/2001); joint replacement; hernia repair (Bilateral, 1993); Carpal tunnel release (Bilateral, 1976); Cardiac pacemaker placement (4/2001);  Hand tendon surgery (Left, 1976); Knee arthroscopy (11/2005, 1989); other surgical history (5/2/2012); Upper gastrointestinal endoscopy (5/3/2001); Finger trigger release (Left, 1994); Diagnostic Cardiac Cath Lab Procedure (5/2/2012,2/22/2011, 3/4/2010, 2/2001); Cardiac catheterization (06/14/2015); Atrial ablation surgery (8/11/15); and Lumbar disc arthroplasty (07/08/2016). reports that he has never smoked. He has never used smokeless tobacco. He reports that he does not drink alcohol and does not use drugs. family history includes Brain Cancer in his paternal grandfather; Coronary Art Dis in his father; Heart Disease in his mother; High Blood Pressure in his mother; Other in his brother; Stroke in his father. Review of Systems:   Cardiovascular: No chest pain, dyspnea on exertion, palpitations or loss of consciousness  Respiratory: No cough or wheezing    Musculoskeletal:  No gait disturbance, weakness, muscle cramps, aches & pains or joint complaints  Neurological: No TIA or stroke symptoms  Psychiatric: No anxiety or depression  Hematologic/Lymphatic: No bleeding problems, blood clots or swollen lymph nodes    Physical Examination:    /82 (Site: Left Upper Arm, Position: Sitting, Cuff Size: Large Adult)   Pulse 80   Ht 5' 6\" (1.676 m)   Wt 222 lb (100.7 kg)   BMI 35.83 kg/m²    Wt Readings from Last 3 Encounters:   11/22/22 222 lb (100.7 kg)   10/03/22 220 lb 6.4 oz (100 kg)   08/16/22 223 lb 6.4 oz (101.3 kg)     Body mass index is 35.83 kg/m². General Appearance: Obese/Well Nourished  1. Skin: It is warm & dry. No rashes noted. 2. Eyes: No conjunctival Pallor seen. No jaundice noted. 3. Neck: is supple there is no elevation of JVD. No thyromegaly  4. Respiratory:  Resp Assessment: No abnormal findings. Resp Auscultation: Vesicular breath sounds without rales or wheezing. 5. Cardiovascular:   Auscultation: Normal S1 & S2, No prominent murmurs  Carotid Arteries: No bruits present  Abdominal Aorta: Non-palpable  Pedal Pulses: 2+ and equal   6. Abdomen:  No masses or tenderness  Liver/Spleen: No Abnormalities Noted, no organomegaly. 7. Musculoskeletal: No joint deformities. No muscle wasting  8. Extremities:   No Cyanosis or Clubbing. There is significant edema with C6 changes. 9. Rectal / genital:   Deferred  10.  Neurological/Psychiatric:  Oriented to time, place, and person  Non-anxious    Lab Results   Component Value Date/Time    CKTOTAL 297 05/01/2012 04:45 AM    CKTOTAL 318 05/01/2012 01:40 AM    CKTOTAL 315 05/01/2012 01:40 AM    CKMB 7.2 05/01/2012 04:45 AM    CKMB 7.6 05/01/2012 01:40 AM    TROPONINT 0.017 06/11/2015 12:13 PM    TROPONINT 0.024 06/11/2015 04:09 AM     BNP:    Lab Results   Component Value Date/Time    BNP 43 12/22/2011 11:00 AM    PROBNP 121.4 12/29/2016 12:05 PM    PROBNP 114.0 01/04/2016 12:37 PM     PT/INR:  No results found for: PTINR  No results found for: LABA1C  Lab Results   Component Value Date    CHOL 121 03/24/2017    CHOL 125 02/02/2016    TRIG 72 03/24/2017    TRIG 67 02/02/2016    HDL 48 03/24/2017    HDL 51 02/02/2016    LDLDIRECT 67 03/24/2017    LDLDIRECT 65 02/02/2016     Lab Results   Component Value Date    ALT 20 11/24/2019    ALT 26 03/24/2017    AST 22 11/24/2019    AST 29 03/24/2017     BMP:    Lab Results   Component Value Date/Time     09/06/2022 01:41 PM     11/24/2019 01:41 PM    K 3.9 09/06/2022 01:41 PM    K 3.5 11/24/2019 01:41 PM     09/06/2022 01:41 PM    CL 96 11/24/2019 01:41 PM    CO2 30 09/06/2022 01:41 PM    CO2 27 11/24/2019 01:41 PM    BUN 27 09/06/2022 01:41 PM    BUN 15 11/24/2019 01:41 PM    CREATININE 1.5 09/06/2022 01:41 PM    CREATININE 1.2 11/24/2019 01:41 PM     CMP:    Lab Results   Component Value Date/Time     09/06/2022 01:41 PM     11/24/2019 01:41 PM    K 3.9 09/06/2022 01:41 PM    K 3.5 11/24/2019 01:41 PM     09/06/2022 01:41 PM    CL 96 11/24/2019 01:41 PM    CO2 30 09/06/2022 01:41 PM CO2 27 11/24/2019 01:41 PM    BUN 27 09/06/2022 01:41 PM    BUN 15 11/24/2019 01:41 PM    CREATININE 1.5 09/06/2022 01:41 PM    CREATININE 1.2 11/24/2019 01:41 PM    PROT 6.6 11/24/2019 01:41 PM    PROT 6.9 03/24/2017 10:27 AM    PROT 7.2 05/01/2012 01:40 AM    PROT 7.4 12/22/2011 11:00 AM     TSH:    Lab Results   Component Value Date/Time    TSHHS 0.925 03/24/2017 10:27 AM     10/3/2022   No evidence of DVT or SVT in the bilateral common femoral vein, femoral    vein, popliteal vein, greater saphenous vein or right small saphenous vein. 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 Significant reflux noted of the Right GSV SFJ (0.8s), and GSV Knee (2.4s). Significant reflux noted in the Left CFV (2.6s), Popliteal V (1.3s), and SSV    Proximal (3.2s). QUALITY MEASURES REVIEWED:  1.CAD:Patient is taking anti platelet agent:Yes  Patient does not have Hx of documented CAD  2. DYSLIPIDEMIA: Patient is on cholesterol lowering medication:Yes   3. Beta-Blocker therapy for CAD, if prior Myocardial Infarction:Yes  4. Counselled regarding smoking cessation. No   Patient does not Smoke. 5.Anticoagulation therapy (for A.Fib) Yes   Does Not have A.Fib.  6.Discussed weight management strategies. Assessment, Recommendations & Tests:     CVI: Patient has symptomatic C6 venous disease. PATIENT has been wearing compression stockings. Has been in the wound clinic.          [place] shows right GSV, & Left SSV + Left CFV as well as Popliteal vein. Recommend ablation of right GSV & Left SSV ablations serially. I spent about 30 min. of time in review of the available data, chart Prep., interviewing patient, obtaining history, performing physical exam, going through decision making analysis for assessment & plans of management on this patient. Office Visit after a month of the procedures.      Jayna Mcgrath MD, 11/22/2022 4:11 PM

## 2022-11-22 NOTE — PROGRESS NOTES
Vein \"LEG PROBLEM Questionnaire\"  Do you have prominent leg veins? No   Do you have any skin discoloration? Yes  Do you have any healed or active sores? Yes, right active (in wound center)  Do you have swelling of the legs? Yes  Do you have a family history of varicose veins? 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 Have you been fighting overweight problems? Yes  8. Do you have restless legs? Yes  9. Do you have any night time cramps? No  10. Do you have any of the following in your legs:        Tiredness weakness  11. If Yes - Have they worn compression stockings Yes  12.  If they have worn compression stockings 1 Years

## 2022-11-22 NOTE — PROGRESS NOTES
CARDIAC CONSULT NOTE       Katheryn Mclain  79 y.o.  male    Chief Complaint   Patient presents with    Edema         Referring physician:  Betty Sawant MD     Primary care physician:  Betty Sawant MD    History of Present Illness:     Katheryn Mclain is a 79 y.o. male referred for evaluation and management of CVI. Patient C/O Leg edema, active right Leg wound, restless,      has a past medical history of Aortic regurgitation, Arrhythmia, Blisters of multiple sites, CAD (coronary artery disease), Cardiac pacemaker, Congestive heart failure ([address] Family history of coronary artery disease, GERD (gastroesophageal reflux disease), H/O cardiac catheterization, H/O cardiovascular stress test, H/O cardiovascular stress test, H/O cardiovascular stress test, H/O Doppler ultrasound, H/O Doppler ultrasound, H/O Doppler ultrasound, H/O Doppler ultrasound, H/O echocardiogram, H/O echocardiogram, H/O echocardiogram, H/O peptic ulcer, Hiatal hernia, History of cardiac monitoring, History of Doppler carotid echocardiogram, History of Holter monitoring, History of nuclear stress test, HX OTHER MEDICAL, HX OTHER MEDICAL, Hyperlipidemia, Hypertension, Nuclear Stress, S/P angioplasty with stent, S/P cardiac pacemaker procedure, Sleep apnea, WD-Chronic venous hypertension (idiopathic) with ulcer of bilateral lower extremity (CODE) ([address] 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 has a past surgical history that includes Coronary angioplasty with stent (3/4/2010, 2/2001); joint replacement; hernia repair (Bilateral, 1993); Carpal tunnel release (Bilateral, 1976); Cardiac pacemaker placement (4/2001); Hand tendon surgery (Left, 1976); Knee arthroscopy (11/2005, 1989); other surgical history (5/2/2012); Upper gastrointestinal endoscopy (5/3/2001);  Finger trigger release (Left, 1994); Diagnostic Cardiac Cath Lab Procedure (5/2/2012,2/22/2011, 3/4/2010, 2/2001); Cardiac catheterization (06/14/2015); Atrial ablation surgery (8/11/15); and Lumbar disc arthroplasty (07/08/2016). reports that he has never smoked. He has never used smokeless tobacco. He reports that he does not drink alcohol and does not use drugs. family history includes Brain Cancer in his paternal grandfather; Coronary Art Dis in his father; Heart Disease in his mother; High Blood Pressure in his mother; Other in his brother; Stroke in his father. Review of Systems:   Cardiovascular: No chest pain, dyspnea on exertion, palpitations or loss of consciousness  Respiratory: No cough or wheezing    Musculoskeletal:  No gait disturbance, weakness, muscle cramps, aches & pains or joint complaints  Neurological: No TIA or stroke symptoms  Psychiatric: No anxiety or depression  Hematologic/Lymphatic: No bleeding problems, blood clots or swollen lymph nodes    Physical Examination:    /82 (Site: Left Upper Arm, Position: Sitting, Cuff Size: Large Adult)   Pulse 80   Ht 5' 6\" (1.676 m)   Wt 222 lb (100.7 kg)   BMI 35.83 kg/m²    Wt Readings from Last 3 Encounters:   11/22/22 222 lb (100.7 kg)   10/03/22 220 lb 6.4 oz (100 kg)   08/16/22 223 lb 6.4 oz (101.3 kg)     Body mass index is 35.83 kg/m². General Appearance: Obese/Well Nourished  1. Skin: It is warm & dry. No rashes noted. 2. Eyes: No conjunctival Pallor seen. No jaundice noted. 3. Neck: is supple there is no elevation of JVD. No thyromegaly  4. Respiratory:  Resp Assessment: No abnormal findings. Resp Auscultation: Vesicular breath sounds without rales or wheezing. 5. Cardiovascular: Auscultation: Normal S1 & S2, No prominent murmurs  Carotid Arteries: No bruits present  Abdominal Aorta: Non-palpable  Pedal Pulses: 2+ and equal   6. Abdomen:  No masses or tenderness  Liver/Spleen: No Abnormalities Noted, no organomegaly.   7. Musculoskeletal: No joint deformities. No muscle wasting  8. Extremities:   No Cyanosis or Clubbing. There is significant edema with C4 changes. 9. Rectal / genital:   Deferred  10.  Neurological/Psychiatric:  Oriented to time, place, and person  Non-anxious    Lab Results   Component Value Date/Time    CKTOTAL 297 05/01/2012 04:45 AM    CKTOTAL 318 05/01/2012 01:40 AM    CKTOTAL 315 05/01/2012 01:40 AM    CKMB 7.2 05/01/2012 04:45 AM    CKMB 7.6 05/01/2012 01:40 AM    TROPONINT 0.017 06/11/2015 12:13 PM    TROPONINT 0.024 06/11/2015 04:09 AM     BNP:    Lab Results   Component Value Date/Time    BNP 43 12/22/2011 11:00 AM    PROBNP 121.4 12/29/2016 12:05 PM    PROBNP 114.0 01/04/2016 12:37 PM     PT/INR:  No results found for: PTINR  No results found for: LABA1C  Lab Results   Component Value Date    CHOL 121 03/24/2017    CHOL 125 02/02/2016    TRIG 72 03/24/2017    TRIG 67 02/02/2016    HDL 48 03/24/2017    HDL 51 02/02/2016    LDLDIRECT 67 03/24/2017    LDLDIRECT 65 02/02/2016     Lab Results   Component Value Date    ALT 20 11/24/2019    ALT 26 03/24/2017    AST 22 11/24/2019    AST 29 03/24/2017     BMP:    Lab Results   Component Value Date/Time     09/06/2022 01:41 PM     11/24/2019 01:41 PM    K 3.9 09/06/2022 01:41 PM    K 3.5 11/24/2019 01:41 PM     09/06/2022 01:41 PM    CL 96 11/24/2019 01:41 PM    CO2 30 09/06/2022 01:41 PM    CO2 27 11/24/2019 01:41 PM    BUN 27 09/06/2022 01:41 PM    BUN 15 11/24/2019 01:41 PM    CREATININE 1.5 09/06/2022 01:41 PM    CREATININE 1.2 11/24/2019 01:41 PM     CMP:    Lab Results   Component Value Date/Time     09/06/2022 01:41 PM     11/24/2019 01:41 PM    K 3.9 09/06/2022 01:41 PM    K 3.5 11/24/2019 01:41 PM     09/06/2022 01:41 PM    CL 96 11/24/2019 01:41 PM    CO2 30 09/06/2022 01:41 PM    CO2 27 11/24/2019 01:41 PM    BUN 27 09/06/2022 01:41 PM    BUN 15 11/24/2019 01:41 PM    CREATININE 1.5 09/06/2022 01:41 PM    CREATININE 1.2 11/24/2019 01:41 PM    PROT 6.6 11/24/2019 01:41 PM    PROT 6.9 03/24/2017 10:27 AM    PROT 7.2 05/01/2012 01:40 AM    PROT 7.4 12/22/2011 11:00 AM     TSH:    Lab Results   Component Value Date/Time    TSHHS 0.925 03/24/2017 10:27 AM     10/3/2022   No evidence of DVT or SVT in the bilateral common femoral vein, femoral    vein, popliteal vein, greater saphenous vein or right small saphenous vein. 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 Significant reflux noted of the Right GSV SFJ (0.8s), and GSV Knee (2.4s). Significant reflux noted in the Left CFV (2.6s), Popliteal V (1.3s), and SSV    Proximal (3.2s). QUALITY MEASURES REVIEWED:  1.CAD:Patient is taking anti platelet agent:Yes  Patient does not have Hx of documented CAD  2. DYSLIPIDEMIA: Patient is on cholesterol lowering medication:Yes   3. Beta-Blocker therapy for CAD, if prior Myocardial Infarction:Yes  4. Counselled regarding smoking cessation. No   Patient does not Smoke. 5.Anticoagulation therapy (for A.Fib) Yes   Does Not have A.Fib.  6.Discussed weight management strategies. Assessment, Recommendations & Tests:     CVI: Patient has symptomatic C4 venous disease. I spent about 30 min. of time in review of the available data, chart Prep., interviewing patient, obtaining history, performing physical exam, going through decision making analysis for assessment & plans of management on this patient. Office Visit for test results.      Tucker Calderón MD, 11/22/2022 4:11 PM

## 2022-11-22 NOTE — PATIENT INSTRUCTIONS
CVI: Patient has symptomatic C6 venous disease. PATIENT has been wearing compression stockings. Has been in the wound clinic.          [address] shows right GSV, & Left SSV + Left CFV as well as Popliteal vein. Recommend ablation of right GSV & Left SSV ablations serially. I spent about 30 min. of time in review of the available data, chart Prep., interviewing patient, obtaining history, performing physical exam, going through decision making analysis for assessment & plans of management on this patient. Office Visit after a month of the procedures.

## 2022-11-22 NOTE — PROGRESS NOTES
CARDIAC CONSULT NOTE       Sherry Pham  79 y.o.  male    Chief Complaint   Patient presents with    Edema         Referring physician:  Sydnie Pham MD     Primary care physician:  Sydnie Pham MD    History of Present Illness:     Sherry Pham is a 79 y.o. male referred for evaluation and management of CVI. Patient C/O Leg edema, skin discoloration,active right Leg wound, restless,      has a past medical history of Aortic regurgitation, Arrhythmia, Blisters of multiple sites, CAD (coronary artery disease), Cardiac pacemaker, Congestive heart failure ([address] Family history of coronary artery disease, GERD (gastroesophageal reflux disease), H/O cardiac catheterization, H/O cardiovascular stress test, H/O cardiovascular stress test, H/O cardiovascular stress test, H/O Doppler ultrasound, H/O Doppler ultrasound, H/O Doppler ultrasound, H/O Doppler ultrasound, H/O echocardiogram, H/O echocardiogram, H/O echocardiogram, H/O peptic ulcer, Hiatal hernia, History of cardiac monitoring, History of Doppler carotid echocardiogram, History of Holter monitoring, History of nuclear stress test, HX OTHER MEDICAL, HX OTHER MEDICAL, Hyperlipidemia, Hypertension, Nuclear Stress, S/P angioplasty with stent, S/P cardiac pacemaker procedure, Sleep apnea, WD-Chronic venous hypertension (idiopathic) with ulcer of bilateral lower extremity (CODE) ([address] 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 has a past surgical history that includes Coronary angioplasty with stent (3/4/2010, 2/2001); joint replacement; hernia repair (Bilateral, 1993); Carpal tunnel release (Bilateral, 1976); Cardiac pacemaker placement (4/2001); Hand tendon surgery (Left, 1976); Knee arthroscopy (11/2005, 1989); other surgical history (5/2/2012);  Upper gastrointestinal endoscopy (5/3/2001); Finger trigger release (Left, 1994); Diagnostic Cardiac Cath Lab Procedure (5/2/2012,2/22/2011, 3/4/2010, 2/2001); Cardiac catheterization (06/14/2015); Atrial ablation surgery (8/11/15); and Lumbar disc arthroplasty (07/08/2016). reports that he has never smoked. He has never used smokeless tobacco. He reports that he does not drink alcohol and does not use drugs. family history includes Brain Cancer in his paternal grandfather; Coronary Art Dis in his father; Heart Disease in his mother; High Blood Pressure in his mother; Other in his brother; Stroke in his father. Review of Systems:   Cardiovascular: No chest pain, dyspnea on exertion, palpitations or loss of consciousness  Respiratory: No cough or wheezing    Musculoskeletal:  No gait disturbance, weakness, muscle cramps, aches & pains or joint complaints  Neurological: No TIA or stroke symptoms  Psychiatric: No anxiety or depression  Hematologic/Lymphatic: No bleeding problems, blood clots or swollen lymph nodes    Physical Examination:    /82 (Site: Left Upper Arm, Position: Sitting, Cuff Size: Large Adult)   Pulse 80   Ht 5' 6\" (1.676 m)   Wt 222 lb (100.7 kg)   BMI 35.83 kg/m²    Wt Readings from Last 3 Encounters:   11/22/22 222 lb (100.7 kg)   10/03/22 220 lb 6.4 oz (100 kg)   08/16/22 223 lb 6.4 oz (101.3 kg)     Body mass index is 35.83 kg/m². General Appearance: Obese/Well Nourished  1. Skin: It is warm & dry. No rashes noted. 2. Eyes: No conjunctival Pallor seen. No jaundice noted. 3. Neck: is supple there is no elevation of JVD. No thyromegaly  4. Respiratory:  Resp Assessment: No abnormal findings. Resp Auscultation: Vesicular breath sounds without rales or wheezing. 5. Cardiovascular: Auscultation: Normal S1 & S2, No prominent murmurs  Carotid Arteries: No bruits present  Abdominal Aorta: Non-palpable  Pedal Pulses: 2+ and equal   6.  Abdomen:  No masses or tenderness  Liver/Spleen: No Abnormalities Noted, no organomegaly. 7. Musculoskeletal: No joint deformities. No muscle wasting  8. Extremities:   No Cyanosis or Clubbing. There is significant edema with C4 changes. 9. Rectal / genital:   Deferred  10.  Neurological/Psychiatric:  Oriented to time, place, and person  Non-anxious    Lab Results   Component Value Date/Time    CKTOTAL 297 05/01/2012 04:45 AM    CKTOTAL 318 05/01/2012 01:40 AM    CKTOTAL 315 05/01/2012 01:40 AM    CKMB 7.2 05/01/2012 04:45 AM    CKMB 7.6 05/01/2012 01:40 AM    TROPONINT 0.017 06/11/2015 12:13 PM    TROPONINT 0.024 06/11/2015 04:09 AM     BNP:    Lab Results   Component Value Date/Time    BNP 43 12/22/2011 11:00 AM    PROBNP 121.4 12/29/2016 12:05 PM    PROBNP 114.0 01/04/2016 12:37 PM     PT/INR:  No results found for: PTINR  No results found for: LABA1C  Lab Results   Component Value Date    CHOL 121 03/24/2017    CHOL 125 02/02/2016    TRIG 72 03/24/2017    TRIG 67 02/02/2016    HDL 48 03/24/2017    HDL 51 02/02/2016    LDLDIRECT 67 03/24/2017    LDLDIRECT 65 02/02/2016     Lab Results   Component Value Date    ALT 20 11/24/2019    ALT 26 03/24/2017    AST 22 11/24/2019    AST 29 03/24/2017     BMP:    Lab Results   Component Value Date/Time     09/06/2022 01:41 PM     11/24/2019 01:41 PM    K 3.9 09/06/2022 01:41 PM    K 3.5 11/24/2019 01:41 PM     09/06/2022 01:41 PM    CL 96 11/24/2019 01:41 PM    CO2 30 09/06/2022 01:41 PM    CO2 27 11/24/2019 01:41 PM    BUN 27 09/06/2022 01:41 PM    BUN 15 11/24/2019 01:41 PM    CREATININE 1.5 09/06/2022 01:41 PM    CREATININE 1.2 11/24/2019 01:41 PM     CMP:    Lab Results   Component Value Date/Time     09/06/2022 01:41 PM     11/24/2019 01:41 PM    K 3.9 09/06/2022 01:41 PM    K 3.5 11/24/2019 01:41 PM     09/06/2022 01:41 PM    CL 96 11/24/2019 01:41 PM    CO2 30 09/06/2022 01:41 PM    CO2 27 11/24/2019 01:41 PM    BUN 27 09/06/2022 01:41 PM    BUN 15 11/24/2019 01:41 PM    CREATININE 1.5 09/06/2022 01:41 PM CREATININE 1.2 11/24/2019 01:41 PM    PROT 6.6 11/24/2019 01:41 PM    PROT 6.9 03/24/2017 10:27 AM    PROT 7.2 05/01/2012 01:40 AM    PROT 7.4 12/22/2011 11:00 AM     TSH:    Lab Results   Component Value Date/Time    TSHHS 0.925 03/24/2017 10:27 AM     10/3/2022   No evidence of DVT or SVT in the bilateral common femoral vein, femoral    vein, popliteal vein, greater saphenous vein or right small saphenous vein. 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 Significant reflux noted of the Right GSV SFJ (0.8s), and GSV Knee (2.4s). Significant reflux noted in the Left CFV (2.6s), Popliteal V (1.3s), and SSV    Proximal (3.2s). QUALITY MEASURES REVIEWED:  1.CAD:Patient is taking anti platelet agent:Yes  Patient does not have Hx of documented CAD  2. DYSLIPIDEMIA: Patient is on cholesterol lowering medication:Yes   3. Beta-Blocker therapy for CAD, if prior Myocardial Infarction:Yes  4. Counselled regarding smoking cessation. No   Patient does not Smoke. 5.Anticoagulation therapy (for A.Fib) Yes   Does Not have A.Fib.  6.Discussed weight management strategies. Assessment, Recommendations & Tests:     CVI: Patient has symptomatic C4 venous disease. I spent about 30 min. of time in review of the available data, chart Prep., interviewing patient, obtaining history, performing physical exam, going through decision making analysis for assessment & plans of management on this patient. Office Visit for test results.      Latonia Ramirez MD, 11/22/2022 4:15 PM

## 2022-11-23 ENCOUNTER — HOSPITAL ENCOUNTER (OUTPATIENT)
Dept: WOUND CARE | Age: 82
Discharge: HOME | End: 2022-11-23
Payer: MEDICARE

## 2022-11-23 VITALS
TEMPERATURE: 98 F | SYSTOLIC BLOOD PRESSURE: 134 MMHG | DIASTOLIC BLOOD PRESSURE: 73 MMHG | HEART RATE: 84 BPM | RESPIRATION RATE: 20 BRPM

## 2022-11-23 DIAGNOSIS — I83.012 VENOUS STASIS ULCER OF RIGHT CALF WITH FAT LAYER EXPOSED, UNSPECIFIED WHETHER VARICOSE VEINS PRESENT (HCC): Primary | ICD-10-CM

## 2022-11-23 DIAGNOSIS — L97.212 VENOUS STASIS ULCER OF RIGHT CALF WITH FAT LAYER EXPOSED, UNSPECIFIED WHETHER VARICOSE VEINS PRESENT (HCC): Primary | ICD-10-CM

## 2022-11-23 DIAGNOSIS — I87.313 CHRONIC VENOUS HYPERTENSION (IDIOPATHIC) WITH ULCER OF BILATERAL LOWER EXTREMITY (CODE) (HCC): ICD-10-CM

## 2022-11-23 DIAGNOSIS — L97.812 NON-PRESSURE CHRONIC ULCER OF OTHER PART OF RIGHT LOWER LEG WITH FAT LAYER EXPOSED (HCC): ICD-10-CM

## 2022-11-23 DIAGNOSIS — L97.821 NON-PRESSURE CHRONIC ULCER OF OTHER PART OF LEFT LOWER LEG LIMITED TO BREAKDOWN OF SKIN (HCC): ICD-10-CM

## 2022-11-23 PROCEDURE — 99212 OFFICE O/P EST SF 10 MIN: CPT

## 2022-11-23 PROCEDURE — 99213 OFFICE O/P EST LOW 20 MIN: CPT | Performed by: NURSE PRACTITIONER

## 2022-11-23 RX ORDER — BACITRACIN, NEOMYCIN, POLYMYXIN B 400; 3.5; 5 [USP'U]/G; MG/G; [USP'U]/G
OINTMENT TOPICAL ONCE
Status: CANCELLED | OUTPATIENT
Start: 2022-11-23 | End: 2022-11-23

## 2022-11-23 RX ORDER — GENTAMICIN SULFATE 1 MG/G
OINTMENT TOPICAL ONCE
Status: CANCELLED | OUTPATIENT
Start: 2022-11-23 | End: 2022-11-23

## 2022-11-23 RX ORDER — BETAMETHASONE DIPROPIONATE 0.05 %
OINTMENT (GRAM) TOPICAL ONCE
Status: CANCELLED | OUTPATIENT
Start: 2022-11-23 | End: 2022-11-23

## 2022-11-23 RX ORDER — LIDOCAINE HYDROCHLORIDE 20 MG/ML
JELLY TOPICAL ONCE
Status: CANCELLED | OUTPATIENT
Start: 2022-11-23 | End: 2022-11-23

## 2022-11-23 RX ORDER — LIDOCAINE 50 MG/G
OINTMENT TOPICAL ONCE
Status: CANCELLED | OUTPATIENT
Start: 2022-11-23 | End: 2022-11-23

## 2022-11-23 RX ORDER — BACITRACIN ZINC AND POLYMYXIN B SULFATE 500; 1000 [USP'U]/G; [USP'U]/G
OINTMENT TOPICAL ONCE
Status: CANCELLED | OUTPATIENT
Start: 2022-11-23 | End: 2022-11-23

## 2022-11-23 RX ORDER — GINSENG 100 MG
CAPSULE ORAL ONCE
Status: CANCELLED | OUTPATIENT
Start: 2022-11-23 | End: 2022-11-23

## 2022-11-23 RX ORDER — LIDOCAINE 40 MG/G
CREAM TOPICAL ONCE
Status: CANCELLED | OUTPATIENT
Start: 2022-11-23 | End: 2022-11-23

## 2022-11-23 RX ORDER — LIDOCAINE HYDROCHLORIDE 40 MG/ML
SOLUTION TOPICAL ONCE
Status: CANCELLED | OUTPATIENT
Start: 2022-11-23 | End: 2022-11-23

## 2022-11-23 RX ORDER — CLOBETASOL PROPIONATE 0.5 MG/G
OINTMENT TOPICAL ONCE
Status: CANCELLED | OUTPATIENT
Start: 2022-11-23 | End: 2022-11-23

## 2022-11-23 NOTE — PROGRESS NOTES
Multilayer Compression Wrap   (Not Unna) Below the Knee    NAME:  Alycia Echols  YOB: 1940  MEDICAL RECORD NUMBER:  [id number]  DATE:  11/23/2022    Multilayer compression wrap: Removed old Multilayer wrap if indicated and wash leg with mild soap/water. Applied moisturizing agent to dry skin as needed. Applied primary and secondary dressing as ordered. Applied multilayered dressing below the knee to right lower leg. Instructed patient/caregiver not to remove dressing and to keep it clean and dry. Instructed patient/caregiver on complications to report to provider, such as pain, numbness in toes, heavy drainage, and slippage of dressing. Instructed patient on purpose of compression dressing and on activity and exercise recommendations.       Electronically signed by Johan Hyman LPN on 45/36/1069 at 4:22 PM

## 2022-11-23 NOTE — DISCHARGE INSTRUCTIONS
ordered 9/16/22  Heel to knee measurement - 42cm        Discharged from the wound care center on 11/23/22. Call [phone] for any questions or concerns.   Date__________   Time___________

## 2022-11-23 NOTE — PROGRESS NOTES
Wound Care Center Progress Note       Magalie Gooden  AGE: 79 y.o. GENDER: male  : 1940  TODAY'S DATE:  2022        Subjective:     Chief Complaint   Patient presents with    Wound Check     Rt leg         HISTORY of PRESENT ILLNESS     Magalie Gooden is a 79 y.o. male who presents today for wound evaluation of Acute venous ulcer(s) of the right lower extremity . The ulcer is of moderate severity. The underlying cause of the wound is venous hypertensions. Patient is known to us and was discharged about a month ago with juxta-lites. Was doing well but developed a large blister and wanted to be seen quickly. Healed today     Patient educated on the 6 essential components necessary for wound healing: Circulation, Debridements, Proper Dressings and Topical Wound Products, Infection Control, Edema Control and Offloading. Patient educated on those factors that negatively effect or impact wound healing: smoking, obesity, uncontrolled diabetes, anticoagulant and immunosuppressive regimens, inadequate nutrition, untreated arterial and venous disease if applicable and measures to manage edema. Off Loading  Offloading or minimizing or removing weight placed on an area with poor circulation such as diabetic wounds or pressure. This can be achieved with crutches, wheel chair, knee walker etc. Minimizing pressure through partial weight bearing (minimizing the amount of  pressure applied and or the amount of time on the area of pressure) or maintaining a non-weight bearing status can be used to promote and often can be essential for thee wound to heal. Off loading may also need to be achieved for non-weight bearing wounds such as pressure ulcers to the torso. Turning and changing positions frequently, at least every two hours. Use of pressure cushion if sitting up in chair.       Skin Care  Keep skin clean and well moisturized , moisturize routinely with ointments for heavier moisturizer needs for extremely dry skin or cracks such as A&D ointment and lotions for a light moisturizer such as CeraVe or Eucerin. If incontinent change incontinence garments as soon as soiled and keeping skin clean and use barrier cream to protect the skin. Reduce Salt and Sodium  Choose low- or reduced- sodium, or no-salt-added versions of foods and condiments when available. Buy fresh, plain frozen, or canned with no-added-salt vegetables. Use fresh poultry, fish and lean meat, rather than canned, smoked or processed types. Choose ready-to-eat breakfast cereals that are lower in sodium. Limit cured foods (such as ford and ham), foods packed in brine (such as pickled foods) and condiments (such as MSG, mustard, horseradish, and catsup). Limit even lower sodium versions of soy sauce and teriyaki sauce-treat these condiments just like salt). In cooking and at the table, flavor foods with herbs, spices, lemon, lime, vinegar or salt-free seasoning blends. Start by cutting salt in half. Cook rice, pasta and hot cereals without salt. Cut back on instant or flavored rice, pasta and cereal mixes, which usually have added salt. Choose convenience foods that are lower in sodium. Limit frozen dinners, packaged mixes, canned soups and dressings. Rinse canned foods, such as tuna, to remove some sodium. Choose fruits or vegetables instead of salty snack foods. Edema Management if applicable  Whenever resting, raise your legs up. Try to keep the swollen area higher than the level of your heart. Take breaks from standing or sitting in one position. Walk around to increase the blood flow in your lower legs. Move your feet and ankles often while you stand, or tighten and relax your leg muscles. Wear support stockings. Put them on in the morning, before swelling gets worse. Eat a balanced diet. Lose weight if you need to. Limit the amount of salt (sodium) in your diet. Salt holds fluid in the body and may increase swelling.  Apply compression stocking(s) every morning as soon as you get up. Remove at bedtime unless instructed to wear day and night. Hand wash and line dry to prevent loss of elasticity. Replace every 3-4 months to ensure proper fit. Weight Management if Applicable  Will need to ultimately change overall eating behaviors to have success with weight loss. Encouraged to weigh daily and work towards a goal of 1-2 pounds of weight loss weekly. Encouraged to journal all food intake, myfitness pal is a useful tool to help keep track of food intake and caloric value. Keep calorie level at approximately 1101-2942. Protein intake is to be a minimum of 60 grams per day (unless otherwise directed). Water drinking was encouraged with a goal of 64oz-128oz daily. Beverages to be calorie free except for milk. Every other beverage should be water, avoid soda. Continue to increase level of physical activity.  Refer to weight management as indicated and       Wound Pain Timing/Severity: waxing and waning  Quality of pain: aching, tender  Severity of pain:  1 / 10   Modifying Factors: edema, venous stasis, and obesity  Associated Signs/Symptoms: edema, erythema, drainage, and pain        PAST MEDICAL HISTORY        Diagnosis Date    Aortic regurgitation 04/03/2012    4/3/2012-mild; 2/7/2011-mild to moderate    Arrhythmia 02/22/2011 2/22/2011-tachycardia episode during cardiac cath-48 HR Holter monitor normal    Blisters of multiple sites 10/05/2016    CAD (coronary artery disease)     sees Dr Vi Orosco, prior Dr Quin Camargo;    Cardiac pacemaker 04/2001 4/2001-[employer] PPM Integrity DDD Model 5342    Congestive heart failure ([address]     Family history of coronary artery disease     Father-MI    GERD (gastroesophageal reflux disease)     H/O cardiac catheterization 5/2/2012, 2/22/2011, 3/4/2010, 2/2001 5/2/2012-[facility]-No evid of signif CAD; Normal peripheral angiogram; 2/22/2011-No signif CAD, stent patent, had tachycardic event; 3/4/2010-Severe stenosis and re in-stent stenosis of LAD    H/O cardiovascular stress test 4/16/2012, 2/7/2011 4/16/2012-Normal Lexiscan study. No evid of ischemia. Attenuation artifact in inferior region of myocardium. EF 58%. H/O cardiovascular stress test 03/04/2015    normal, no ischemia, EF62%    H/O cardiovascular stress test 12/06/2017    EF60% normal study    H/O Doppler ultrasound 02/07/2011 2/7/2011-CAROTID US-Normal    H/O Doppler ultrasound 05/24/2013    periphal normal    H/O Doppler ultrasound 06/18/2014 6/14 Normal    H/O Doppler ultrasound 11/13/15 09/03/15    11/15 WNL 9/15Arterial and Venous Doppler is normal.     H/O echocardiogram 03/04/2015    EF60% Normal LV and systolic function. Trace MR, Aortic, and TR. No other valvulopathy. H/O echocardiogram 08/08/2016    EF60% mild AR, mild phtn    H/O echocardiogram 11/01/2017    EF55-60% sclerotic, non stenotic AV, mild AR, phtn    H/O peptic ulcer 1970s    Hiatal hernia     History of cardiac monitoring 06/01/2015    Event - Patient noted to have recurrence of atrial fibrillation with RVR on medical therapy on event monitor    History of Doppler carotid echocardiogram 01/15/2020    No hemodynamically significant stenosis in the internal carotid artery bilaterally, Right internal carotid artery exhibits mild homogenous plaque, Left internal exhibits minimal homogenous plaque, totuosity of the bilateral internal carotid arteries    History of Holter monitoring 05/19/2015    24 hour - predominant rhythm was sinus intermittent paced, paced rhythm can not be ruled out d/t pacemaker function being turned off during device setup, limited pacemaker data available    History of nuclear stress test 11/10/2016    lexiscan-normal,EF70%    HX OTHER MEDICAL 05/02/2012 5/2/2012-PERIPHERAL ANGIOGRAM-Normal-Dr Molina    HX OTHER MEDICAL 03/02/2011    3/2/2011-48 HR HOLTER MONITOR-Pred Rhythm is sinus rhythm. No arrhythmias.     Hyperlipidemia     Hypertension Nuclear Stress 07/07/2021    EF 53%.     S/P angioplasty with stent 3/4/2010, 2/2001    3/4/2010-PTCA with 2.75 X 38 mm stent to LAD; 2/2001-PTCA with stent to LAD    S/P cardiac pacemaker procedure 06/04/2013    battery replacement    Sleep apnea     has CPAP    WD-Chronic venous hypertension (idiopathic) with ulcer of bilateral lower extremity (CODE) ([facility] [address] 2/18/2022    WD-Non-pressure chronic ulcer of other part of left lower leg limited to breakdown of skin ([address] 2/18/2022    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 ([address] 2/18/2022       PAST SURGICAL HISTORY    Past Surgical History:   Procedure Laterality Date    ATRIAL ABLATION SURGERY  8/11/15    Atrial fib/flutter ablation     CARDIAC CATHETERIZATION  06/14/2015    no significant disease    CARDIAC PACEMAKER PLACEMENT  4/2001 4/2001-[employer] PPM Integrity DDD Model 5342    CARPAL TUNNEL RELEASE Bilateral 1976    CORONARY ANGIOPLASTY WITH STENT PLACEMENT  3/4/2010, 2/2001    3/4/2010-PTCA with 2.75 X 38 mm stent to LAD; 2/2001-PTCA with stent to LAD;    DIAGNOSTIC CARDIAC CATH LAB PROCEDURE  5/2/2012,2/22/2011, 3/4/2010, 2/2001 5/2/2012-[facility]-No evid of signif CAD; Normal peripheral angiogram; 2/22/2011-No signif CAD, stent patent, had tachycardic event; 3/4/2010-Severe stenosis and re in-stent stenosis of LAD    FINGER TRIGGER RELEASE Left 1994    thumb    HAND TENDON SURGERY Left 1976    Left middle finger    HERNIA REPAIR Bilateral 1993    bilateral inguinal hernias    JOINT REPLACEMENT      knees bilaterally-Right 10/2009; 1/2008-Left    KNEE ARTHROSCOPY  11/2005, 1989 11/2005, 1989-Right knee    LUMBAR DISC ARTHROPLASTY  07/08/2016    OTHER SURGICAL HISTORY  5/2/2012 5/2/2012-PERIPHERAL ANGIOGRAM-Normal    UPPER GASTROINTESTINAL ENDOSCOPY  5/3/2001    5/3/2525-[facility]vn[facility])       FAMILY HISTORY    Family History   Problem Relation Age of Onset    Heart Disease Mother     High Blood Pressure Mother     Stroke Father     Coronary Art Dis Father         MI    Other Brother         accidental    Brain Cancer Paternal Grandfather        SOCIAL HISTORY    Social History     Tobacco Use    Smoking status: Never    Smokeless tobacco: Never   Vaping Use    Vaping Use: Never used   Substance Use Topics    Alcohol use: No     Comment: caffiene: 1 can daily    Drug use: No       ALLERGIES    Allergies   Allergen Reactions    Ceclor [Cefaclor] Rash    Celebrex [Celecoxib] Rash    Pcn [Penicillins] Rash    Relafen [Nabumetone] Rash       MEDICATIONS    Current Outpatient Medications on File Prior to Encounter   Medication Sig Dispense Refill    torsemide (DEMADEX) 20 MG tablet Take 1 tablet by mouth in the morning and at bedtime 60 tablet 3    amLODIPine (NORVASC) 5 MG tablet       TRAZODONE HCL PO Take by mouth      DULoxetine (CYMBALTA) 30 MG extended release capsule Take 30 mg by mouth daily      ferrous sulfate (IRON 325) 325 (65 Fe) MG tablet Take 325 mg by mouth daily      potassium chloride (KLOR-CON M) 20 MEQ extended release tablet Take 1 tablet by mouth daily      diclofenac-miSOPROStol (ARTHROTEC 75) 75-0.2 MG per tablet Take 1 tablet by mouth 2 times daily      tiZANidine (ZANAFLEX) 4 MG tablet TAKE 1/2 TABLET BY MOUTH THREE TIMES A DAY FOR 2 DAYS, THEN TAKE 1 TABLET BY MOUTH THREE TIMES A DAY AFTER  2    losartan (COZAAR) 50 MG tablet Take 50 mg by mouth daily       dexlansoprazole (DEXILANT) 60 MG CPDR delayed release capsule Take 60 mg by mouth daily      zolpidem (AMBIEN) 10 MG tablet Take by mouth nightly as needed for Sleep (Patient not taking: Reported on 11/22/2022)      dronedarone hcl (MULTAQ) 400 MG TABS Take 400 mg by mouth 2 times daily (with meals)      nitroGLYCERIN (NITROLINGUAL) 0.4 MG/SPRAY 0.4 mg spray Place 1 spray under the tongue every 5 minutes as needed for Chest pain (Patient not taking: Reported on 11/22/2022)      CPAP Machine MISC by Does not apply route nightly apixaban (ELIQUIS) 5 MG TABS tablet Take 1 tablet by mouth 2 times daily 60 tablet 3    rosuvastatin (CRESTOR) 20 MG tablet Take 20 mg by mouth daily. pramipexole (MIRAPEX) 1 MG tablet   Take 0.5 mg by mouth 2 times daily Patient takes 1/2 tablet in the am and 1 1/2 tablet at night. metoprolol (LOPRESSOR) 50 MG tablet Take 50 mg by mouth 2 times daily. modafinil (PROVIGIL) 100 MG tablet Take 100 mg by mouth daily. esomeprazole (NEXIUM) 40 MG capsule Take 40 mg by mouth 2 times daily. aspirin 81 MG chewable tablet Take 81 mg by mouth daily. No current facility-administered medications on file prior to encounter. REVIEW OF SYSTEMS    Pertinent items are noted in HPI. Constitutional: Negative for systemic symptoms including fever, chills and malaise. Objective:      /73   Pulse 84   Temp 98 °F (36.7 °C) (Temporal)   Resp 20     PHYSICAL EXAM      General: The patient is in no acute distress. Mental status:  Patient is appropriate, is  oriented to place and plan of care. Dermatologic exam: Visual inspection of the periwound reveals the skin to be normal in turgor and texture, dry, and slack.   Wound exam:  see wound description below     All active wounds listed below with today's date are evaluated      Wound 10/05/16 Venous ulcer WOUND #1 RT PROXIMAL LEG(ONSET 2 MTHS) (Active)   Number of days: 2240       Wound 10/05/16 Venous ulcer WOUND #2 RT LATERAL LEG CLUSTER (ONSET 2MTHS) (Active)   Number of days: 2240       Wound 10/05/16 Venous ulcer WOUND #3RT DISTAL LEG(ONSET 2 MTHS) (Active)   Number of days: 2240       Wound 10/05/16 Venous ulcer WOUND 4 RT MEDIAL LEG (ONSET 2 MTHS) (Active)   Number of days: 2240       Wound 11/09/22 Wound #1 Right Medial Lower leg (Active)   Wound Image   11/23/22 1550   Wound Etiology Venous 11/23/22 1550   Dressing Status New dressing applied 11/16/22 1558   Wound Cleansed Soap and water 11/23/22 1550   Offloading for Diabetic Foot Ulcers Offloading not required 11/16/22 1558   Wound Length (cm) 0.2 cm 11/23/22 1550   Wound Width (cm) 0.2 cm 11/23/22 1550   Wound Depth (cm) 0.1 cm 11/23/22 1550   Wound Surface Area (cm^2) 0.04 cm^2 11/23/22 1550   Change in Wound Size % (l*w) 99.85 11/23/22 1550   Wound Volume (cm^3) 0.004 cm^3 11/23/22 1550   Wound Healing % 100 11/23/22 1550   Post-Procedure Length (cm) 3.8 cm 11/16/22 1546   Post-Procedure Width (cm) 5.7 cm 11/16/22 1546   Post-Procedure Depth (cm) 0.1 cm 11/16/22 1546   Post-Procedure Surface Area (cm^2) 21.66 cm^2 11/16/22 1546   Post-Procedure Volume (cm^3) 2.166 cm^3 11/16/22 1546   Distance Tunneling (cm) 0 cm 11/23/22 1550   Tunneling Position ___ O'Clock 0 11/23/22 1550   Undermining Starts ___ O'Clock 0 11/23/22 1550   Undermining Ends___ O'Clock 0 11/23/22 1550   Undermining Maxium Distance (cm) 00 11/23/22 1550   Wound Assessment Pink/red 11/23/22 1550   Drainage Amount None 11/23/22 1550   Drainage Description Clear;Yellow 11/16/22 1527   Odor None 11/23/22 1550   Kiki-wound Assessment Maceration;Fragile 11/16/22 1527   Margins Defined edges 11/23/22 1550   Wound Thickness Description not for Pressure Injury Full thickness 11/23/22 1550   Number of days: 14       Assessment:       Problem List Items Addressed This Visit          Circulatory    WD-Chronic venous hypertension (idiopathic) with ulcer of bilateral lower extremity (CODE) (HCC)       Other    WD-Venous stasis ulcer of right calf with fat layer exposed ([address] - Primary    Relevant Orders    Initiate Outpatient Wound Care Protocol    WD-Non-pressure chronic ulcer of other part of left lower leg limited to breakdown of skin ([address]    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 ([address]       Status of wound progress and description from last visit:   Healed .     Did not bring juxta-lites with him so we would like to wrap him for a week and he can remove at home and begin self managing        Plan:     Discharge Instructions         PHYSICIAN ORDERS AND DISCHARGE INSTRUCTIONS     NOTE: Upon discharge from the [address], you will receive a patient experience survey. We would be grateful if you would take the time to fill this survey out. Wound care order history:                 HORTENSIA's   Right  1.36     Left 1.29            Date: 9/2/2022              Cultures:                Grafts:                Antibiotics:           Continuing wound care orders and information:                 Residence:  Private               Continue home health care with: None at this time               Your wound-care supplies will be provided by: Wound cleansing:               Do not scrub or use excessive force. Wash hands with soap and water before and after dressing changes. Prior to applying a clean dressing, cleanse wound with normal saline, wound cleanser, or mild soap and water. Ask the physician or nurse before getting the wound(s) wet in a shower     Daily Wound management:              Keep weight off wounds and reposition every 2 hours. Avoid standing for long periods of time. Apply wraps/stockings in AM and remove at bedtime. If swelling is present, elevate legs to the level of the heart or above for 30 minutes 4-5 times a day and/or when sitting. When taking antibiotics take entire prescription as ordered by physician do not stop taking until medicine is all gone. Orders for this week: 11/23/22                  Venous studies ordered 9/2/22     Rx: Paramjit [address]     Right Lower Extremity - Wash with soap and water, pat dry. Apply Phuong to wound bed. Cover with silicone border. Wrap with Coban 2. Leave in place for 1 week.   Then cut off or unwrap dressing and return to wearing Juxtalite or compression stockings at 30mm/Hg during the day, may remove at night. Juxtalites ordered 9/16/22  Heel to knee measurement - 42cm        Discharged from the wound care center on 11/23/22. Call [phone] for any questions or concerns.   Date__________   Time___________      Treatment Note      Written Patient Dismissal Instructions Given            Electronically signed by MALLORY Angulo CNP on 11/23/2022 at 4:12 PM

## 2022-12-02 ENCOUNTER — OFFICE VISIT (OUTPATIENT)
Dept: PULMONOLOGY | Age: 82
End: 2022-12-02
Payer: MEDICARE

## 2022-12-02 VITALS
SYSTOLIC BLOOD PRESSURE: 154 MMHG | DIASTOLIC BLOOD PRESSURE: 72 MMHG | HEIGHT: 66 IN | WEIGHT: 222.8 LBS | OXYGEN SATURATION: 94 % | BODY MASS INDEX: 35.81 KG/M2 | HEART RATE: 83 BPM

## 2022-12-02 DIAGNOSIS — I27.20 PULMONARY HYPERTENSION (HCC): ICD-10-CM

## 2022-12-02 DIAGNOSIS — J45.30 MILD PERSISTENT REACTIVE AIRWAY DISEASE WITHOUT COMPLICATION: ICD-10-CM

## 2022-12-02 DIAGNOSIS — E66.9 OBESITY (BMI 35.0-39.9 WITHOUT COMORBIDITY): ICD-10-CM

## 2022-12-02 DIAGNOSIS — J84.9 ILD (INTERSTITIAL LUNG DISEASE) (HCC): ICD-10-CM

## 2022-12-02 DIAGNOSIS — G47.10 HYPERSOMNIA: ICD-10-CM

## 2022-12-02 DIAGNOSIS — G47.33 OSA TREATED WITH BIPAP: ICD-10-CM

## 2022-12-02 PROBLEM — J45.909 REACTIVE AIRWAY DISEASE WITHOUT COMPLICATION: Status: ACTIVE | Noted: 2022-12-02

## 2022-12-02 PROCEDURE — 99214 OFFICE O/P EST MOD 30 MIN: CPT | Performed by: INTERNAL MEDICINE

## 2022-12-02 PROCEDURE — 3074F SYST BP LT 130 MM HG: CPT | Performed by: INTERNAL MEDICINE

## 2022-12-02 PROCEDURE — 3078F DIAST BP <80 MM HG: CPT | Performed by: INTERNAL MEDICINE

## 2022-12-02 RX ORDER — ALBUTEROL SULFATE 90 UG/1
2 AEROSOL, METERED RESPIRATORY (INHALATION) EVERY 6 HOURS PRN
Qty: 18 G | Refills: 3 | Status: SHIPPED | OUTPATIENT
Start: 2022-12-02

## 2022-12-02 RX ORDER — BUDESONIDE AND FORMOTEROL FUMARATE DIHYDRATE 160; 4.5 UG/1; UG/1
2 AEROSOL RESPIRATORY (INHALATION) 2 TIMES DAILY
Qty: 10.2 G | Refills: 3 | Status: SHIPPED | OUTPATIENT
Start: 2022-12-02

## 2022-12-02 ASSESSMENT — ENCOUNTER SYMPTOMS
ABDOMINAL PAIN: 0
COUGH: 0
EYE DISCHARGE: 0
ABDOMINAL DISTENTION: 0
BACK PAIN: 0
EYE ITCHING: 0
SHORTNESS OF BREATH: 1

## 2022-12-02 NOTE — PROGRESS NOTES
Zee Cordova  1940  Referring Provider: Norberto Montague MD    Subjective:     Chief Complaint   Patient presents with    Follow-up     4 week F/U PFT/6MWT       HPI  Rosette Cartwright is a 79 y.o. male has come back as a follow up. He has severe JAIMIE on a BIPAP which he is using it every night about 8 hours. He says that it is helping him. He has no loss of weight. He has a nasal mask. He is not sleepy during the day time. He has SOBOE. He has mild Pulmonary HTN. He is on Lasix for the LE swelling. His HRCT of chest done on 09/13/22 showed :  Bilateral peribronchial wall thickening. Negative for interstitial lung disease       Emphysema     His PFT done on 09/13/22 showed Severe small airways dysfunction with bronchodilator response. His 6 MWT showed mild decrease in walking distance but no desaturation.     Current Outpatient Medications   Medication Sig Dispense Refill    albuterol sulfate HFA (PROVENTIL;VENTOLIN;PROAIR) 108 (90 Base) MCG/ACT inhaler Inhale 2 puffs into the lungs every 6 hours as needed for Wheezing 18 g 3    budesonide-formoterol (SYMBICORT) 160-4.5 MCG/ACT AERO Inhale 2 puffs into the lungs 2 times daily 10.2 g 3    torsemide (DEMADEX) 20 MG tablet Take 1 tablet by mouth in the morning and at bedtime 60 tablet 3    amLODIPine (NORVASC) 5 MG tablet       TRAZODONE HCL PO Take by mouth      DULoxetine (CYMBALTA) 30 MG extended release capsule Take 30 mg by mouth daily      ferrous sulfate (IRON 325) 325 (65 Fe) MG tablet Take 325 mg by mouth daily      potassium chloride (KLOR-CON M) 20 MEQ extended release tablet Take 1 tablet by mouth daily      diclofenac-miSOPROStol (ARTHROTEC 75) 75-0.2 MG per tablet Take 1 tablet by mouth 2 times daily      tiZANidine (ZANAFLEX) 4 MG tablet TAKE 1/2 TABLET BY MOUTH THREE TIMES A DAY FOR 2 DAYS, THEN TAKE 1 TABLET BY MOUTH THREE TIMES A DAY AFTER  2    losartan (COZAAR) 50 MG tablet Take 50 mg by mouth daily       dexlansoprazole (DEXILANT) 60 MG CPDR delayed release capsule Take 60 mg by mouth daily      zolpidem (AMBIEN) 10 MG tablet Take by mouth nightly as needed for Sleep.      dronedarone hcl (MULTAQ) 400 MG TABS Take 400 mg by mouth 2 times daily (with meals)      CPAP Machine MISC by Does not apply route nightly      apixaban (ELIQUIS) 5 MG TABS tablet Take 1 tablet by mouth 2 times daily 60 tablet 3    rosuvastatin (CRESTOR) 20 MG tablet Take 20 mg by mouth daily. pramipexole (MIRAPEX) 1 MG tablet   Take 0.5 mg by mouth 2 times daily Patient takes 1/2 tablet in the am and 1 1/2 tablet at night. metoprolol (LOPRESSOR) 50 MG tablet Take 50 mg by mouth 2 times daily. modafinil (PROVIGIL) 100 MG tablet Take 100 mg by mouth daily. esomeprazole (NEXIUM) 40 MG capsule Take 40 mg by mouth 2 times daily. aspirin 81 MG chewable tablet Take 81 mg by mouth daily. nitroGLYCERIN (NITROLINGUAL) 0.4 MG/SPRAY 0.4 mg spray Place 1 spray under the tongue every 5 minutes as needed for Chest pain (Patient not taking: No sig reported)       No current facility-administered medications for this visit.        Allergies   Allergen Reactions    Ceclor [Cefaclor] Rash    Celebrex [Celecoxib] Rash    Pcn [Penicillins] Rash    Relafen [Nabumetone] Rash       Past Medical History:   Diagnosis Date    Aortic regurgitation 04/03/2012    4/3/2012-mild; 2/7/2011-mild to moderate    Arrhythmia 02/22/2011 2/22/2011-tachycardia episode during cardiac cath-48 HR Holter monitor normal    Blisters of multiple sites 10/05/2016    CAD (coronary artery disease)     sees Dr Manjeet Ribera, prior Dr Jose Carver;    Cardiac pacemaker 04/2001 4/2001-[employer] PPM Integrity DDD Model 5342    Congestive heart failure [facility])     Family history of coronary artery disease     Father-MI    GERD (gastroesophageal reflux disease)     H/O cardiac catheterization 5/2/2012, 2/22/2011, 3/4/2010, 2/2001 5/2/2012-C-No evid of signif CAD; Normal peripheral angiogram; 2/22/2011-No signif CAD, stent patent, had tachycardic event; 3/4/2010-Severe stenosis and re in-stent stenosis of LAD    H/O cardiovascular stress test 4/16/2012, 2/7/2011 4/16/2012-Normal Lexiscan study. No evid of ischemia. Attenuation artifact in inferior region of myocardium. EF 58%. H/O cardiovascular stress test 03/04/2015    normal, no ischemia, EF62%    H/O cardiovascular stress test 12/06/2017    EF60% normal study    H/O Doppler ultrasound 02/07/2011 2/7/2011-CAROTID US-Normal    H/O Doppler ultrasound 05/24/2013    periphal normal    H/O Doppler ultrasound 06/18/2014 6/14 Normal    H/O Doppler ultrasound 11/13/15 09/03/15    11/15 WNL 9/15Arterial and Venous Doppler is normal.     H/O echocardiogram 03/04/2015    EF60% Normal LV and systolic function. Trace MR, Aortic, and TR. No other valvulopathy.      H/O echocardiogram 08/08/2016    EF60% mild AR, mild phtn    H/O echocardiogram 11/01/2017    EF55-60% sclerotic, non stenotic AV, mild AR, phtn    H/O peptic ulcer 1970s    Hiatal hernia     History of cardiac monitoring 06/01/2015    Event - Patient noted to have recurrence of atrial fibrillation with RVR on medical therapy on event monitor    History of Doppler carotid echocardiogram 01/15/2020    No hemodynamically significant stenosis in the internal carotid artery bilaterally, Right internal carotid artery exhibits mild homogenous plaque, Left internal exhibits minimal homogenous plaque, totuosity of the bilateral internal carotid arteries    History of Holter monitoring 05/19/2015    24 hour - predominant rhythm was sinus intermittent paced, paced rhythm can not be ruled out d/t pacemaker function being turned off during device setup, limited pacemaker data available    History of nuclear stress test 11/10/2016    lexiscan-normal,EF70%    HX OTHER MEDICAL 05/02/2012 5/2/2012-PERIPHERAL ANGIOGRAM-Normal-Dr Molina    HX OTHER MEDICAL 03/02/2011    3/2/2011-48 HR HOLTER MONITOR-Pred Rhythm is sinus rhythm. No arrhythmias. Hyperlipidemia     Hypertension     Nuclear Stress 07/07/2021    EF 53%. S/P angioplasty with stent 3/4/2010, 2/2001    3/4/2010-PTCA with 2.75 X 38 mm stent to LAD; 2/2001-PTCA with stent to LAD    S/P cardiac pacemaker procedure 06/04/2013    battery replacement    Sleep apnea     has CPAP    WD-Chronic venous hypertension (idiopathic) with ulcer of bilateral lower extremity (CODE) ([facility] [address] 2/18/2022    WD-Non-pressure chronic ulcer of other part of left lower leg limited to breakdown of skin ([address] 2/18/2022    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 ([address] 2/18/2022       Past Surgical History:   Procedure Laterality Date    ATRIAL ABLATION SURGERY  8/11/15    Atrial fib/flutter ablation     CARDIAC CATHETERIZATION  06/14/2015    no significant disease    CARDIAC PACEMAKER PLACEMENT  4/2001 4/2001-[employer] PPM Integrity DDD Model 5342    CARPAL TUNNEL RELEASE Bilateral 1976    CORONARY ANGIOPLASTY WITH STENT PLACEMENT  3/4/2010, 2/2001    3/4/2010-PTCA with 2.75 X 38 mm stent to LAD; 2/2001-PTCA with stent to LAD;    DIAGNOSTIC CARDIAC CATH LAB PROCEDURE  5/2/2012,2/22/2011, 3/4/2010, 2/2001 5/2/2012-[facility]-No evid of signif CAD; Normal peripheral angiogram; 2/22/2011-No signif CAD, stent patent, had tachycardic event; 3/4/2010-Severe stenosis and re in-stent stenosis of LAD    FINGER TRIGGER RELEASE Left 1994    thumb    HAND TENDON SURGERY Left 1976    Left middle finger    HERNIA REPAIR Bilateral 1993    bilateral inguinal hernias    JOINT REPLACEMENT      knees bilaterally-Right 10/2009; 1/2008-Left    KNEE ARTHROSCOPY  11/2005, 1989 11/2005, 1989-Right knee    LUMBAR DISC ARTHROPLASTY  07/08/2016    OTHER SURGICAL HISTORY  5/2/2012 5/2/2012-PERIPHERAL ANGIOGRAM-Normal    UPPER GASTROINTESTINAL ENDOSCOPY  5/3/2001    5/3/0733-[facility]jddjt-[facility])       Social History     Socioeconomic History    Marital status:   Spouse name: None    Number of children: None    Years of education: None    Highest education level: None   Tobacco Use    Smoking status: Never    Smokeless tobacco: Never   Vaping Use    Vaping Use: Never used   Substance and Sexual Activity    Alcohol use: No     Comment: caffiene: 1 can daily    Drug use: No    Sexual activity: Yes     Partners: Female     Comment:         Review of Systems   Constitutional:  Negative for fatigue. HENT:  Negative for congestion and postnasal drip. Eyes:  Negative for discharge and itching. Respiratory:  Positive for shortness of breath. Negative for cough. Cardiovascular:  Negative for chest pain and leg swelling. Gastrointestinal:  Negative for abdominal distention and abdominal pain. Endocrine: Negative for cold intolerance and heat intolerance. Genitourinary:  Negative for enuresis and frequency. Musculoskeletal:  Negative for arthralgias and back pain. Allergic/Immunologic: Negative for environmental allergies and food allergies. Neurological:  Negative for light-headedness and headaches. Hematological:  Negative for adenopathy. Psychiatric/Behavioral:  Negative for agitation and behavioral problems. Objective:   BP (!) 154/72 (Site: Left Upper Arm, Position: Sitting, Cuff Size: Large Adult)   Pulse 83   Ht 5' 6\" (1.676 m)   Wt 222 lb 12.8 oz (101.1 kg)   SpO2 94%   BMI 35.96 kg/m²   Body mass index is 35.96 kg/m². Sleep Medicine 12/3/2018   Sitting and reading 3   Watching TV 3   Sitting, inactive in a public place (e.g. a theatre or a meeting) 3   As a passenger in a car for an hour without a break 3   Lying down to rest in the afternoon when circumstances permit 2   Sitting and talking to someone 3   Sitting quietly after a lunch without alcohol 3   In a car, while stopped for a few minutes in traffic 2   [place] Sleepiness Score 22   Neck circumference (Inches) 19.25     Mallampati 4    Physical Exam  Vitals reviewed. Constitutional:       Appearance: Normal appearance. HENT:      Head: Normocephalic and atraumatic. Nose: Nose normal.      Mouth/Throat:      Mouth: Mucous membranes are moist.   Eyes:      Extraocular Movements: Extraocular movements intact. Pupils: Pupils are equal, round, and reactive to light. Cardiovascular:      Rate and Rhythm: Normal rate and regular rhythm. Pulses: Normal pulses. Heart sounds: Normal heart sounds. Pulmonary:      Effort: Pulmonary effort is normal.      Comments: Occasional exp wheeze  Abdominal:      General: Abdomen is flat. Palpations: Abdomen is soft. Musculoskeletal:         General: Normal range of motion. Cervical back: Normal range of motion and neck supple. Skin:     General: Skin is warm and dry. Neurological:      General: No focal deficit present. Mental Status: He is alert and oriented to person, place, and time. Psychiatric:         Mood and Affect: Mood normal.         Behavior: Behavior normal.       Radiology:   Bilateral peribronchial wall thickening.        Negative for interstitial lung disease       Emphysema       Assessment and Plan     Problem List          Circulatory    Pulmonary hypertension ([address]      CHF optimization  Diuresis  BIPAP qhs and prn  Get yearly flu vaccine  Await ECHO in 1 year            Respiratory    ILD (interstitial lung disease) ([address]      None         Reactive airway disease without complication      Albuterol prn  Symbicort  Get yearly flu vaccine         JAIMIE treated with BiPAP      Advised to be compliant with the BIPAP  Loose weight            Other    Hypersomnia       Advised to be compliant with the BIPAP  Loose weight           Obesity (BMI 35.0-39.9 without comorbidity)      Advised to loose weight with diet and exercise           Relevant Medications    modafinil (PROVIGIL) 100 MG tablet            Follow-Up:    Return in about 1 year (around 12/2/2023) for ECHO, 2 week download data.     Progress notes sent to the referring Provider    Gen Musa MD MD  12/2/2022  11:44 AM

## 2022-12-07 ENCOUNTER — TELEPHONE (OUTPATIENT)
Dept: CARDIOLOGY CLINIC | Age: 82
End: 2022-12-07

## 2022-12-07 DIAGNOSIS — I87.2 CHRONIC VENOUS INSUFFICIENCY: Primary | ICD-10-CM

## 2022-12-07 RX ORDER — DIAZEPAM 5 MG/1
5 TABLET ORAL PRN
Qty: 2 TABLET | Refills: 0 | OUTPATIENT
Start: 2022-12-07 | End: 2022-12-12

## 2022-12-12 ENCOUNTER — PROCEDURE VISIT (OUTPATIENT)
Dept: CARDIOLOGY CLINIC | Age: 82
End: 2022-12-12
Payer: MEDICARE

## 2022-12-12 DIAGNOSIS — G47.33 OSA TREATED WITH BIPAP: ICD-10-CM

## 2022-12-12 DIAGNOSIS — Z98.890 S/P ABLATION OF ATRIAL FIBRILLATION: ICD-10-CM

## 2022-12-12 DIAGNOSIS — I83.893 VARICOSE VEINS OF BOTH LEGS WITH EDEMA: Primary | ICD-10-CM

## 2022-12-12 DIAGNOSIS — I48.0 PAROXYSMAL ATRIAL FIBRILLATION (HCC): ICD-10-CM

## 2022-12-12 DIAGNOSIS — I49.5 SSS (SICK SINUS SYNDROME) (HCC): ICD-10-CM

## 2022-12-12 DIAGNOSIS — Z98.61 HISTORY OF PTCA: ICD-10-CM

## 2022-12-12 DIAGNOSIS — E66.9 OBESITY (BMI 35.0-39.9 WITHOUT COMORBIDITY): ICD-10-CM

## 2022-12-12 DIAGNOSIS — E78.2 MIXED HYPERLIPIDEMIA: ICD-10-CM

## 2022-12-12 DIAGNOSIS — I10 PRIMARY HYPERTENSION: ICD-10-CM

## 2022-12-12 DIAGNOSIS — Z86.79 S/P ABLATION OF ATRIAL FIBRILLATION: ICD-10-CM

## 2022-12-12 DIAGNOSIS — Z86.79 S/P ABLATION OF ATRIAL FLUTTER: ICD-10-CM

## 2022-12-12 DIAGNOSIS — I25.118 CORONARY ARTERY DISEASE OF NATIVE HEART WITH STABLE ANGINA PECTORIS, UNSPECIFIED VESSEL OR LESION TYPE (HCC): ICD-10-CM

## 2022-12-12 DIAGNOSIS — Z95.0 PACEMAKER: ICD-10-CM

## 2022-12-12 DIAGNOSIS — Z98.890 S/P ABLATION OF ATRIAL FLUTTER: ICD-10-CM

## 2022-12-12 PROCEDURE — 36482 ENDOVEN THER CHEM ADHES 1ST: CPT | Performed by: INTERNAL MEDICINE

## 2022-12-12 NOTE — PROGRESS NOTES
Endovenous Ablation with VenaSeal Operative Report    12/12/2022    Subjective:  Jb Schaefer is a 79 y.o. male    Procedure Performed:    Endovenous Ablation of the Great Saphenous Vein with VenaSeal Closure System of the  Right side. Indication  Duplex ultrasound was used to map out the insufficient saphenous vein, and access was determined and marked on the overlying skin. The depth and diameter of the vein(s) to be treated was documented. The patient was placed supine on the procedure table and the leg was prepped and draped using sterile technique. Ultrasound guidance was again used to localize the access site. 1% lidocaine was injected as a local anesthetic in the subcutaneous tissues at the target location in the GSV in the lower leg. Using ultrasound guidance, access was gained at this location with the 19 gauge thin walled access needle and followed by introduction of a short guidewire, location confirmed with ultrasound. A small, 3 mm incision was made at the access site to allow for introduction and placement of the 7 Fr x7cm introducer/dilator. The dilator and guidewire were removed. The 0.035 guidewire from the DR MEDINA [facility] kit was then introduced and positioned at the saphenofemoral junction using ultrasound guidance. The 80 cm 7 Fr introducer sheath/dilator was positioned 5cm from the saphenofemoral junction. The guidewire and dilator were removed, and the remaining sheath was flushed with sterile saline, with the syringe remaining in place prior to the next steps. The cyanoacrylate adhesive was precisely primed into the 5 F delivery catheter and this catheter/syringe combination was attached within the dispenser gun. This \"assembly\" was introduced through the 7 F sheath and positioned 5 cm caudal of the saphenofemoral junction under ultrasound guidance.  The steps from the IFU were followed for dispensing amounts, locations and compression times, e.g 2 aliquots proximally with 3 minutes of compression, and 1 aliquot every 3cm distally with 30 sec of compression along the course of the vessel. Following the last injection and compression sequence, the catheter and introducer sheath were pulled out from the access site. Hemostasis was achieved with manual compression and an adhesive bandage was applied to the incision. Ultrasound confirmed complete coaptation and closure of the treated segments of the GSV, and the absence of any DVT at the saphenofemoral junction. Treatment dose was approximately 1.8 ml and the vein length treated was 48 cm. The drapes were removed and the patient cleaned and prepared for discharge. Post op ultrasound check is scheduled for   48- 72 hours and the patient was given written post-op instructions. Complications: none immediate    Blood Loss: minimal    Conclusion:   Successful Endovenous Ablation of the Great Saphenous Vein with VenaSeal Closure System of the  Right side.       Joaquim Palacios MD, [facility] [place]

## 2022-12-12 NOTE — PROGRESS NOTES
VENOUS PRE-PROCEDURE H & P  12/12/2022    Subjective:  Ashwin Clark is a 79 y.o. male    GENERAL - A-Ox3- In no respiratory distress  Heart - Regular rhythm, normal S1, S2, no gallops, no murmurs, no friction rubs  Chest - CTA & percussion    Vein Exam:     Right ext - varicosities, spider and reticular veins Yes, skin changes Yes, ulcers No, edema Yes    Left ext  - varicosities, spider and reticular veins Yes, skin changes Yes, ulcers No, edema Yes    Reflex Study:   10/3/2022   No evidence of DVT or SVT in the bilateral common femoral vein, femoral    vein, popliteal vein, greater saphenous vein or right small saphenous vein. 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 Significant reflux noted of the Right GSV SFJ (0.8s), and GSV Knee (2.4s). Significant reflux noted in the Left CFV (2.6s), Popliteal V (1.3s), and SSV    Proximal (3.2s). Assessment:   Patient has symptomatic C4 venous disease. Plan:   Ablation of Right GSV. Informed consent obtained.     Matt Mcneill MD, [facility] [place]

## 2022-12-14 ENCOUNTER — TELEPHONE (OUTPATIENT)
Dept: CARDIOLOGY CLINIC | Age: 82
End: 2022-12-14

## 2022-12-14 ENCOUNTER — PROCEDURE VISIT (OUTPATIENT)
Dept: CARDIOLOGY CLINIC | Age: 82
End: 2022-12-14
Payer: MEDICARE

## 2022-12-14 DIAGNOSIS — I87.301 IDIOPATHIC CHRONIC VENOUS HYPERTENSION OF RIGHT LOWER EXTREMITY WITHOUT COMPLICATIONS: Primary | ICD-10-CM

## 2022-12-14 DIAGNOSIS — I87.2 CHRONIC VENOUS INSUFFICIENCY: Primary | ICD-10-CM

## 2022-12-14 PROCEDURE — 93971 EXTREMITY STUDY: CPT | Performed by: INTERNAL MEDICINE

## 2022-12-14 RX ORDER — DIAZEPAM 5 MG/1
5 TABLET ORAL PRN
Qty: 2 TABLET | Refills: 0 | Status: SHIPPED | OUTPATIENT
Start: 2022-12-14 | End: 2022-12-19

## 2022-12-14 NOTE — TELEPHONE ENCOUNTER
Pre- Instructions Venous ablation     Date of Procedure: 12/12/22 Time: 8 AM Arrival Time: 745 AM      Please keep yourself hydrated for 24 hours before the procedure ( drink lots of water)  Please  the Valium that was sent to [place]  and bring it with you to the procedure. Please wear loose comfortable clothing. Patient does not need to wear compression stockings to the procedure. You will need someone with you to drive you home. Please eat a light breakfast in the morning  Please continue to take medications as needed. Confirmed with patient. Reviewed instructions and patient voiced understanding. Medication called in to pharmacy.

## 2022-12-19 ENCOUNTER — PROCEDURE VISIT (OUTPATIENT)
Dept: CARDIOLOGY CLINIC | Age: 82
End: 2022-12-19
Payer: MEDICARE

## 2022-12-19 DIAGNOSIS — E78.2 MIXED HYPERLIPIDEMIA: ICD-10-CM

## 2022-12-19 DIAGNOSIS — I83.893 VARICOSE VEINS OF BOTH LEGS WITH EDEMA: Primary | ICD-10-CM

## 2022-12-19 DIAGNOSIS — I48.0 PAF (PAROXYSMAL ATRIAL FIBRILLATION) (HCC): ICD-10-CM

## 2022-12-19 DIAGNOSIS — I25.118 CORONARY ARTERY DISEASE OF NATIVE HEART WITH STABLE ANGINA PECTORIS, UNSPECIFIED VESSEL OR LESION TYPE (HCC): ICD-10-CM

## 2022-12-19 DIAGNOSIS — I10 PRIMARY HYPERTENSION: ICD-10-CM

## 2022-12-19 DIAGNOSIS — G47.33 OSA TREATED WITH BIPAP: ICD-10-CM

## 2022-12-19 DIAGNOSIS — Z95.0 PACEMAKER: ICD-10-CM

## 2022-12-19 DIAGNOSIS — Z98.61 HISTORY OF PTCA: ICD-10-CM

## 2022-12-19 PROCEDURE — 36482 ENDOVEN THER CHEM ADHES 1ST: CPT | Performed by: INTERNAL MEDICINE

## 2022-12-19 NOTE — PROGRESS NOTES
VENOUS PRE-PROCEDURE H & P  12/19/2022    Subjective:  Damion Magaña is a 79 y.o. male    GENERAL - A-Ox3- In no respiratory distress  Heart - Regular rhythm, normal S1, S2, no gallops, no murmurs, no friction rubs  Chest - CTA & percussion    Vein Exam:     Right ext - varicosities, spider and reticular veins Yes, skin changes Yes, ulcers No, edema Yes    Left ext  - varicosities, spider and reticular veins Yes, skin changes Yes, ulcers No, edema Yes    Reflex Study:   10/3/2022   No evidence of DVT or SVT in the bilateral common femoral vein, femoral    vein, popliteal vein, greater saphenous vein or right small saphenous vein. 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 Significant reflux noted of the Right GSV SFJ (0.8s), and GSV Knee (2.4s). Significant reflux noted in the Left CFV (2.6s), Popliteal V (1.3s), and SSV    Proximal (3.2s). Assessment:   Patient has symptomatic C4 venous disease. Plan:   Ablation of left SSV. Informed consent obtained.     Esvin Tirado MD, [facility] [place]

## 2022-12-19 NOTE — PROGRESS NOTES
Endovenous Ablation with VenaSeal Operative Report    12/19/2022    Subjective:  Aliyah Lecah is a 79 y.o. male    Procedure Performed:    Endovenous Ablation of the Small Saphenous Vein with VenaSeal Closure System of the  Left side. Indication  Duplex ultrasound was used to map out the insufficient saphenous vein, and access was determined and marked on the overlying skin. The depth and diameter of the vein(s) to be treated was documented. The patient was placed supine on the procedure table and the leg was prepped and draped using sterile technique. Ultrasound guidance was again used to localize the access site. 1% lidocaine was injected as a local anesthetic in the subcutaneous tissues at the target location in the GSV in the lower leg. Using ultrasound guidance, access was gained at this location with the 19 gauge thin walled access needle and followed by introduction of a short guidewire, location confirmed with ultrasound. A small, 3 mm incision was made at the access site to allow for introduction and placement of the 7 Fr x7cm introducer/dilator. The dilator and guidewire were removed. The 0.035 guidewire from the DR MEDINA [facility] kit was then introduced and positioned at the saphenofemoral junction using ultrasound guidance. The 80 cm 7 Fr introducer sheath/dilator was positioned 5cm from the saphenofemoral junction. The guidewire and dilator were removed, and the remaining sheath was flushed with sterile saline, with the syringe remaining in place prior to the next steps. The cyanoacrylate adhesive was precisely primed into the 5 F delivery catheter and this catheter/syringe combination was attached within the dispenser gun. This \"assembly\" was introduced through the 7 F sheath and positioned 5 cm caudal of the saphenofemoral junction under ultrasound guidance.  The steps from the IFU were followed for dispensing amounts, locations and compression times, e.g 2 aliquots proximally with 3 minutes of compression, and 1 aliquot every 3cm distally with 30 sec of compression along the course of the vessel. Following the last injection and compression sequence, the catheter and introducer sheath were pulled out from the access site. Hemostasis was achieved with manual compression and an adhesive bandage was applied to the incision. Ultrasound confirmed complete coaptation and closure of the treated segments of the GSV, and the absence of any DVT at the saphenofemoral junction. Treatment dose was approximately 1.0 ml and the vein length treated was 24cm. The drapes were removed and the patient cleaned and prepared for discharge. Post op ultrasound check is scheduled for   48- 72 hours and the patient was given written post-op instructions. Complications: none immediate    Blood Loss: minimal    Conclusion:   Successful Endovenous Ablation of the Small Saphenous Vein with VenaSeal Closure System of the  Left side.       Florence Black MD, [facility] [place]

## 2022-12-21 ENCOUNTER — PROCEDURE VISIT (OUTPATIENT)
Dept: CARDIOLOGY CLINIC | Age: 82
End: 2022-12-21
Payer: MEDICARE

## 2022-12-21 DIAGNOSIS — I87.302 IDIOPATHIC CHRONIC VENOUS HYPERTENSION OF LEFT LOWER EXTREMITY WITHOUT COMPLICATIONS: Primary | ICD-10-CM

## 2022-12-21 PROCEDURE — 93971 EXTREMITY STUDY: CPT | Performed by: INTERNAL MEDICINE

## 2022-12-27 ENCOUNTER — TELEPHONE (OUTPATIENT)
Dept: CARDIOLOGY CLINIC | Age: 82
End: 2022-12-27

## 2022-12-27 NOTE — TELEPHONE ENCOUNTER
Venous doppler:12/21/22   Left Popliteal vein is patent with good compressibility and respirophasic    signal with good augmentation. The Left SSV is non-compressible with no evidence of flow just past the    saphenopopliteal junction to the distal calf.      Patient verbally understood

## 2023-01-03 RX ORDER — TORSEMIDE 20 MG/1
20 TABLET ORAL 2 TIMES DAILY
Qty: 60 TABLET | Refills: 5 | Status: SHIPPED | OUTPATIENT
Start: 2023-01-03

## 2023-01-22 PROCEDURE — 93294 REM INTERROG EVL PM/LDLS PM: CPT | Performed by: INTERNAL MEDICINE

## 2023-01-22 PROCEDURE — 93296 REM INTERROG EVL PM/IDS: CPT | Performed by: INTERNAL MEDICINE

## 2023-01-23 ENCOUNTER — PROCEDURE VISIT (OUTPATIENT)
Dept: CARDIOLOGY CLINIC | Age: 83
End: 2023-01-23
Payer: MEDICARE

## 2023-01-23 DIAGNOSIS — Z95.0 CARDIAC PACEMAKER IN SITU: ICD-10-CM

## 2023-01-30 ENCOUNTER — PROCEDURE VISIT (OUTPATIENT)
Dept: CARDIOLOGY CLINIC | Age: 83
End: 2023-01-30
Payer: MEDICARE

## 2023-01-30 DIAGNOSIS — I87.301 CHRONIC VENOUS HYPERTENSION INVOLVING RIGHT SIDE: Primary | ICD-10-CM

## 2023-01-30 PROCEDURE — 93971 EXTREMITY STUDY: CPT | Performed by: INTERNAL MEDICINE

## 2023-01-31 ENCOUNTER — TELEPHONE (OUTPATIENT)
Dept: CARDIOLOGY CLINIC | Age: 83
End: 2023-01-31

## 2023-01-31 NOTE — TELEPHONE ENCOUNTER
Venous doppler  Right CFV and left Popliteal Vein are patent with good compressibility and    respirophasic signal with good augmentation. The Right GSV is non-compressible with no evidence of flow just past the    saphenofemoral junction to the proximal calf. The Left SSV is non-compressible with no evidence of flow just past the    saphenopopliteal junction to the distal calf.      Patient verbally understood

## 2023-02-07 ENCOUNTER — OFFICE VISIT (OUTPATIENT)
Dept: CARDIOLOGY CLINIC | Age: 83
End: 2023-02-07
Payer: MEDICARE

## 2023-02-07 VITALS
SYSTOLIC BLOOD PRESSURE: 120 MMHG | HEIGHT: 66 IN | DIASTOLIC BLOOD PRESSURE: 78 MMHG | BODY MASS INDEX: 35.71 KG/M2 | WEIGHT: 222.2 LBS | HEART RATE: 88 BPM

## 2023-02-07 DIAGNOSIS — I48.0 PAF (PAROXYSMAL ATRIAL FIBRILLATION) (HCC): ICD-10-CM

## 2023-02-07 DIAGNOSIS — J84.9 ILD (INTERSTITIAL LUNG DISEASE) (HCC): ICD-10-CM

## 2023-02-07 DIAGNOSIS — Z98.890 S/P ABLATION OF ATRIAL FLUTTER: ICD-10-CM

## 2023-02-07 DIAGNOSIS — G47.30 SLEEP APNEA, UNSPECIFIED TYPE: ICD-10-CM

## 2023-02-07 DIAGNOSIS — I25.118 CORONARY ARTERY DISEASE OF NATIVE HEART WITH STABLE ANGINA PECTORIS, UNSPECIFIED VESSEL OR LESION TYPE (HCC): ICD-10-CM

## 2023-02-07 DIAGNOSIS — I49.5 SSS (SICK SINUS SYNDROME) (HCC): ICD-10-CM

## 2023-02-07 DIAGNOSIS — Z98.61 HISTORY OF PTCA: ICD-10-CM

## 2023-02-07 DIAGNOSIS — Z86.79 S/P ABLATION OF ATRIAL FLUTTER: ICD-10-CM

## 2023-02-07 DIAGNOSIS — Z95.0 PACEMAKER: ICD-10-CM

## 2023-02-07 DIAGNOSIS — I83.893 VARICOSE VEINS OF BOTH LEGS WITH EDEMA: Primary | ICD-10-CM

## 2023-02-07 DIAGNOSIS — E78.2 MIXED HYPERLIPIDEMIA: ICD-10-CM

## 2023-02-07 DIAGNOSIS — R06.02 SOB (SHORTNESS OF BREATH) ON EXERTION: ICD-10-CM

## 2023-02-07 DIAGNOSIS — I10 PRIMARY HYPERTENSION: ICD-10-CM

## 2023-02-07 DIAGNOSIS — G47.33 OSA TREATED WITH BIPAP: ICD-10-CM

## 2023-02-07 DIAGNOSIS — E66.9 OBESITY (BMI 35.0-39.9 WITHOUT COMORBIDITY): ICD-10-CM

## 2023-02-07 PROCEDURE — 99213 OFFICE O/P EST LOW 20 MIN: CPT | Performed by: INTERNAL MEDICINE

## 2023-02-07 PROCEDURE — 3078F DIAST BP <80 MM HG: CPT | Performed by: INTERNAL MEDICINE

## 2023-02-07 PROCEDURE — 1123F ACP DISCUSS/DSCN MKR DOCD: CPT | Performed by: INTERNAL MEDICINE

## 2023-02-07 PROCEDURE — 3074F SYST BP LT 130 MM HG: CPT | Performed by: INTERNAL MEDICINE

## 2023-02-07 NOTE — PATIENT INSTRUCTIONS
CORONARY ARTERY DISEASE:Yes  clinically stable. Patient is on optimal medical regimen ( see medication list above )  F/U per . HYPERTENSION:Yes  well controlled on current medical regimen.  - changes in  treatment:   no. Patient is on Amlodipine, Lopressor & Losartan  Counseled regarding low salt diet, exercise & weight control. CONGESTIVE HEART FAILURE:Yes, compensated. DYSLIPIDEMIA: yes,   Patient's profile is at / near [address] current medical regimen wellyes. Takes Crestor  See most recent Lab values:( Reviewed Labs from family Dr. GREGORY     )  LDL is 79  HDL is 48    ARRHYTHMIAS:yes, S/P Ablations  Patient has H/O Atrial fibrillation  He is rate controlled & on anticoagulation. Takes Eliquis  Patient is in NSR with the help of anti arrhythmics. CHRONIC VENOUS INSUFFICIENCY:yes, S/P Ablation of right GSV & left SSV. Patient had symptomatic C4 disease. 1/30/2023   Right CFV and left Popliteal Vein are patent with good compressibility and    respirophasic signal with good augmentation. The Right GSV is non-compressible with no evidence of flow just past the    saphenofemoral junction to the proximal calf. The Left SSV is non-compressible with no evidence of flow just past the    saphenopopliteal junction to the distal calf. Patient stable post ablations with excellent results. TESTS ORDERED:none this visit     PREVIOUSLY ORDERED TESTS REVIEWED & DISCUSSED WITH THE PATIENT:     I personally reviewed & interpreted, all previously ordered tests as copied above. Latest Labs are pulled in to the note with dates. Labs, specially in Reference to Lipid profile, Cardiac testing in the form of Echo ( dated: ), stress tests ( dated: ) & other relevant cardiac testing reviewed with patient & recommendations made based on assessment of the results. Discussed role of Cardiac risk factors & effects + treatment of co morbidities with patient & advised accordingly. MEDICATIONS: List of medications patient is currently taking is reviewed in detail with the patient. Discussed any side effects or problems taking the medication. Recommend Continue present management & medications as listed. AFFIRMATION: I spent at least 20 minutes of time reviewing patient's history, previous & current medical problems & all Labs + testing. This includes chart prep even prior to the vosit. Various goals are discussed and multiple questions answered. Relevant concelling performed. Office follow up with  per schedule. Device check per protocol.

## 2023-02-07 NOTE — PROGRESS NOTES
OFFICE PROGRESS NOTES      Bharati Modi is a 79 y.o. male who has    CHIEF COMPLAINT AS FOLLOWS:  CHEST PAIN: Patient denies any C/O chest pains at this time. SOB: Has SOB with exertion but no change over previous noted. LEG EDEMA:  leg edema is better. PALPITATIONS: Denies any C/O Palpitations   DIZZINESS: No C/O Dizziness   SYNCOPE: None   OTHER/ ADDITIONAL COMPLAINTS:                                     HPI: Patient is here for F/U on his CAD,  CHF,  Arrhythmia, HTN & Dyslipidemia problems. CAD: Patient has known CAD. Had angioplasty in the past.  CHF: Patient has known diastolic (HFpEF). Patient had been managed with medical regimen as stated below. Arrhythmia: Patient has known  A-fib / flutter, SSS S/P PPM.  HTN: Patient has known essential HTN. Has been treated with guideline recommended medical / physical/ diet therapy as stated below. Dyslipidemia: Patient has known mixed dyslipidemia. Has been treated with guideline recommended medical / physical/ diet therapy as stated below.                 Current Outpatient Medications   Medication Sig Dispense Refill    torsemide (DEMADEX) 20 MG tablet Take 1 tablet by mouth in the morning and at bedtime 60 tablet 5    albuterol sulfate HFA (PROVENTIL;VENTOLIN;PROAIR) 108 (90 Base) MCG/ACT inhaler Inhale 2 puffs into the lungs every 6 hours as needed for Wheezing 18 g 3    budesonide-formoterol (SYMBICORT) 160-4.5 MCG/ACT AERO Inhale 2 puffs into the lungs 2 times daily 10.2 g 3    amLODIPine (NORVASC) 5 MG tablet       TRAZODONE HCL PO Take by mouth      DULoxetine (CYMBALTA) 30 MG extended release capsule Take 30 mg by mouth daily      ferrous sulfate (IRON 325) 325 (65 Fe) MG tablet Take 325 mg by mouth daily      potassium chloride (KLOR-CON M) 20 MEQ extended release tablet Take 1 tablet by mouth daily      diclofenac-miSOPROStol (ARTHROTEC 75) 75-0.2 MG per tablet Take 1 tablet by mouth 2 times daily      losartan (COZAAR) 50 MG tablet Take 50 mg by mouth daily       dexlansoprazole (DEXILANT) 60 MG CPDR delayed release capsule Take 60 mg by mouth daily      dronedarone hcl (MULTAQ) 400 MG TABS Take 400 mg by mouth 2 times daily (with meals)      nitroGLYCERIN (NITROLINGUAL) 0.4 MG/SPRAY 0.4 mg spray Place 1 spray under the tongue every 5 minutes as needed for Chest pain      CPAP Machine MISC by Does not apply route nightly      apixaban (ELIQUIS) 5 MG TABS tablet Take 1 tablet by mouth 2 times daily 60 tablet 3    rosuvastatin (CRESTOR) 20 MG tablet Take 20 mg by mouth daily. pramipexole (MIRAPEX) 1 MG tablet   Take 0.5 mg by mouth 2 times daily Patient takes 1/2 tablet in the am and 1 1/2 tablet at night. metoprolol (LOPRESSOR) 50 MG tablet Take 50 mg by mouth 2 times daily. modafinil (PROVIGIL) 100 MG tablet Take 100 mg by mouth daily. esomeprazole (NEXIUM) 40 MG capsule Take 40 mg by mouth 2 times daily. aspirin 81 MG chewable tablet Take 81 mg by mouth daily. tiZANidine (ZANAFLEX) 4 MG tablet TAKE 1/2 TABLET BY MOUTH THREE TIMES A DAY FOR 2 DAYS, THEN TAKE 1 TABLET BY MOUTH THREE TIMES A DAY AFTER (Patient not taking: Reported on 2/7/2023)  2    zolpidem (AMBIEN) 10 MG tablet Take by mouth nightly as needed for Sleep. (Patient not taking: Reported on 2/7/2023)       No current facility-administered medications for this visit.      Allergies: Ceclor [cefaclor], Celebrex [celecoxib], Pcn [penicillins], and Relafen [nabumetone]  Review of Systems:    Constitutional: Negative for diaphoresis and fatigue  Respiratory: Negative for shortness of breath  Cardiovascular: Negative for chest pain, dyspnea on exertion, claudication, edema, irregular heartbeat, murmur, palpitations or shortness of breath  Musculoskeletal: Negative for muscle pain, muscular weakness, negative for pain in arm and leg or swelling in foot and leg    Objective:  /78 (Site: Left Upper Arm, Position: Sitting, Cuff Size: Medium Adult)   Pulse 88   Ht 5' 6\" (1.676 m)   Wt 222 lb 3.2 oz (100.8 kg)   BMI 35.86 kg/m²   Wt Readings from Last 3 Encounters:   02/07/23 222 lb 3.2 oz (100.8 kg)   12/02/22 222 lb 12.8 oz (101.1 kg)   11/22/22 222 lb (100.7 kg)     Body mass index is 35.86 kg/m². GENERAL - Alert, oriented, pleasant, in no apparent distress. EYES: No jaundice, no conjunctival pallor. Neck - Supple. No jugular venous distention noted. No carotid bruits. Cardiovascular - Normal S1 and S2 without obvious murmur or gallop. Extremities - No cyanosis, clubbing, or significant edema. Pulmonary - No respiratory distress. No wheezes or rales.       MEDICAL DECISION MAKING & DATA REVIEW:    Lab Review   Lab Results   Component Value Date/Time    TROPONINT 0.017 06/11/2015 12:13 PM    TROPONINT 0.024 06/11/2015 04:09 AM     Lab Results   Component Value Date/Time    BNP 43 12/22/2011 11:00 AM    PROBNP 121.4 12/29/2016 12:05 PM    PROBNP 114.0 01/04/2016 12:37 PM     Lab Results   Component Value Date    INR 0.92 01/04/2016    INR 1.03 08/10/2015     No results found for: LABA1C  Lab Results   Component Value Date    WBC 8.7 11/24/2019    WBC 6.5 03/24/2017    HCT 33.6 (L) 11/24/2019    HCT 34.9 (L) 03/24/2017    MCV 90.1 11/24/2019    MCV 86.6 03/24/2017     11/24/2019     03/24/2017     Lab Results   Component Value Date    CHOL 121 03/24/2017    CHOL 125 02/02/2016    TRIG 72 03/24/2017    TRIG 67 02/02/2016    HDL 48 03/24/2017    HDL 51 02/02/2016    LDLDIRECT 67 03/24/2017    LDLDIRECT 65 02/02/2016     Lab Results   Component Value Date    ALT 20 11/24/2019    ALT 26 03/24/2017    AST 22 11/24/2019    AST 29 03/24/2017     BMP:    Lab Results   Component Value Date/Time     09/06/2022 01:41 PM     11/24/2019 01:41 PM    K 3.9 09/06/2022 01:41 PM    K 3.5 11/24/2019 01:41 PM     09/06/2022 01:41 PM    CL 96 11/24/2019 01:41 PM    CO2 30 09/06/2022 01:41 PM    CO2 27 11/24/2019 01:41 PM BUN 27 09/06/2022 01:41 PM    BUN 15 11/24/2019 01:41 PM    CREATININE 1.5 09/06/2022 01:41 PM    CREATININE 1.2 11/24/2019 01:41 PM     CMP:   Lab Results   Component Value Date/Time     09/06/2022 01:41 PM     11/24/2019 01:41 PM    K 3.9 09/06/2022 01:41 PM    K 3.5 11/24/2019 01:41 PM     09/06/2022 01:41 PM    CL 96 11/24/2019 01:41 PM    CO2 30 09/06/2022 01:41 PM    CO2 27 11/24/2019 01:41 PM    BUN 27 09/06/2022 01:41 PM    BUN 15 11/24/2019 01:41 PM    CREATININE 1.5 09/06/2022 01:41 PM    CREATININE 1.2 11/24/2019 01:41 PM    PROT 6.6 11/24/2019 01:41 PM    PROT 6.9 03/24/2017 10:27 AM    PROT 7.2 05/01/2012 01:40 AM    PROT 7.4 12/22/2011 11:00 AM     Lab Results   Component Value Date/Time    TSHHS 0.925 03/24/2017 10:27 AM       QUALITY MEASURES REVIEWED:  1.CAD:Patient is taking anti platelet agent:Yes  2. DYSLIPIDEMIA: Patient is on cholesterol lowering medication:Yes   3. Beta-Blocker therapy for CAD, if prior Myocardial Infarction:Yes   4. Counselled regarding smoking cessation. No   Patient does not Smoke. 5.Anticoagulation therapy (for A.Fib) Yes  6. Discussed weight management strategies. Assessment & Plan:  Primary / Secondary prevention is the goal by aggressive risk modification, healthy and therapeutic life style changes for cardiovascular risk reduction. CORONARY ARTERY DISEASE:Yes  clinically stable. Patient is on optimal medical regimen ( see medication list above )  F/U per . HYPERTENSION:Yes  well controlled on current medical regimen.  - changes in  treatment:   no. Patient is on Amlodipine, Lopressor & Losartan  Counseled regarding low salt diet, exercise & weight control. CONGESTIVE HEART FAILURE:Yes, compensated. DYSLIPIDEMIA: yes,   Patient's profile is at / near [address] current medical regimen wellyes.  Takes Crestor  See most recent Lab values:( Reviewed Labs from family Dr. GREGORY     )  LDL is 67  HDL is 48    ARRHYTHMIAS:yes, S/P Ablations  Patient has H/O Atrial fibrillation  He is rate controlled & on anticoagulation. Takes Eliquis  Patient is in NSR with the help of anti arrhythmics. CHRONIC VENOUS INSUFFICIENCY:yes, S/P Ablation of right GSV & left SSV. Patient had symptomatic C4 disease. 1/30/2023   Right CFV and left Popliteal Vein are patent with good compressibility and    respirophasic signal with good augmentation. The Right GSV is non-compressible with no evidence of flow just past the    saphenofemoral junction to the proximal calf. The Left SSV is non-compressible with no evidence of flow just past the    saphenopopliteal junction to the distal calf. Patient stable post ablations with excellent results. TESTS ORDERED:none this visit     PREVIOUSLY ORDERED TESTS REVIEWED & DISCUSSED WITH THE PATIENT:     I personally reviewed & interpreted, all previously ordered tests as copied above. Latest Labs are pulled in to the note with dates. Labs, specially in Reference to Lipid profile, Cardiac testing in the form of Echo ( dated: ), stress tests ( dated: ) & other relevant cardiac testing reviewed with patient & recommendations made based on assessment of the results. Discussed role of Cardiac risk factors & effects + treatment of co morbidities with patient & advised accordingly. MEDICATIONS: List of medications patient is currently taking is reviewed in detail with the patient. Discussed any side effects or problems taking the medication. Recommend Continue present management & medications as listed. AFFIRMATION: I spent at least 20 minutes of time reviewing patient's history, previous & current medical problems & all Labs + testing. This includes chart prep even prior to the vosit. Various goals are discussed and multiple questions answered. Relevant concelling performed. Office follow up with  per schedule. Device check per protocol.

## 2023-02-07 NOTE — LETTER
[facility] [address]  [address] [id number]  Phone: [phone]  Fax: [phone]    Juma Kirby MD    February 7, 2023     Adriano Joiner, [address] [place] [address]    Patient: Palmer Morrison   MR Number: [id number]   YOB: 1940   Date of Visit: 2/7/2023       Dear Adriano Joiner: Thank you for referring Aaron Bejarano to me for evaluation/treatment. Below are the relevant portions of my assessment and plan of care. If you have questions, please do not hesitate to call me. I look forward to following [facility] along with you.     Sincerely,      Juma Kirby MD

## 2023-03-07 ENCOUNTER — OFFICE VISIT (OUTPATIENT)
Dept: CARDIOLOGY CLINIC | Age: 83
End: 2023-03-07
Payer: MEDICARE

## 2023-03-07 VITALS
WEIGHT: 215.6 LBS | HEIGHT: 66 IN | BODY MASS INDEX: 34.65 KG/M2 | HEART RATE: 72 BPM | SYSTOLIC BLOOD PRESSURE: 124 MMHG | DIASTOLIC BLOOD PRESSURE: 62 MMHG

## 2023-03-07 DIAGNOSIS — I25.10 CORONARY ARTERY DISEASE INVOLVING NATIVE CORONARY ARTERY OF NATIVE HEART WITHOUT ANGINA PECTORIS: Primary | ICD-10-CM

## 2023-03-07 PROCEDURE — 3078F DIAST BP <80 MM HG: CPT | Performed by: INTERNAL MEDICINE

## 2023-03-07 PROCEDURE — 99214 OFFICE O/P EST MOD 30 MIN: CPT | Performed by: INTERNAL MEDICINE

## 2023-03-07 PROCEDURE — 1123F ACP DISCUSS/DSCN MKR DOCD: CPT | Performed by: INTERNAL MEDICINE

## 2023-03-07 PROCEDURE — 3074F SYST BP LT 130 MM HG: CPT | Performed by: INTERNAL MEDICINE

## 2023-03-07 RX ORDER — MONTELUKAST SODIUM 10 MG/1
TABLET ORAL
COMMUNITY
Start: 2023-02-23

## 2023-03-07 RX ORDER — LOSARTAN POTASSIUM AND HYDROCHLOROTHIAZIDE 12.5; 5 MG/1; MG/1
TABLET ORAL
COMMUNITY
Start: 2023-02-23

## 2023-03-07 RX ORDER — FLUTICASONE FUROATE AND VILANTEROL 200; 25 UG/1; UG/1
POWDER RESPIRATORY (INHALATION)
COMMUNITY
Start: 2023-02-23

## 2023-03-07 NOTE — PROGRESS NOTES
CARDIOLOGY NOTE      3/7/2023    RE: Sanjeev Figueroa  (1940)                               TO:  Dr. Argenis Reynolds MD            Ulysess Klinefelter is a 79 y.o. male who was seen today for management of coronary artery disease                                    HPI:                   Pt has h/o coronary artery disease, hypertension, hyperlipidemia, permanent pacemaker, atrial fibrillation, seen today for follow-up.    Sanjeev Figueroa has the following history recorded in care path:  Patient Active Problem List    Diagnosis Date Noted    Reactive airway disease without complication 84/39/5417    Varicose veins of both legs with edema 11/22/2022    WD-Venous stasis ulcer of right calf with fat layer exposed ([address] 11/09/2022    Hypersomnia 08/12/2022    ILD (interstitial lung disease) ([address] 08/12/2022    Syncope and collapse 10/24/2017    Blisters of multiple sites 10/05/2016    Hematuria     Abdominal discomfort     S/P ablation of atrial fibrillation     S/P ablation of atrial flutter     Atrial fibrillation ([address]     History of cardiac monitoring 06/01/2015    PAF (paroxysmal atrial fibrillation) (HCC)     Tachycardia     Congestive heart failure (HCC)     Pacemaker     CAD (coronary artery disease) 02/23/2015    Swelling of calf 02/18/2014    Pacer at end of battery life 05/29/2013    SSS (sick sinus syndrome) ([address] 05/29/2013    Claudication ([address] 05/24/2013    History of PTCA     Hyperlipidemia     Hypertension     Sleep apnea     WD-Chronic venous hypertension (idiopathic) with ulcer of bilateral lower extremity (CODE) ([address] 02/18/2022    WD-Non-pressure chronic ulcer of other part of left lower leg limited to breakdown of skin ([address] 02/18/2022    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 ([address] 02/18/2022    Venous stasis 02/19/2020    Pulmonary hypertension ([address] 02/11/2019    JAIMIE treated with BiPAP 12/03/2018    Obesity (BMI 35.0-39.9 without comorbidity) 12/03/2018    Excessive daytime sleepiness 12/03/2018    SOB (shortness of breath) on exertion 12/03/2018     Current Outpatient Medications   Medication Sig Dispense Refill    fluticasone furoate-vilanterol (BREO ELLIPTA) 200-25 MCG/ACT AEPB inhaler       losartan-hydroCHLOROthiazide (HYZAAR) 50-12.5 MG per tablet       montelukast (SINGULAIR) 10 MG tablet       torsemide (DEMADEX) 20 MG tablet Take 1 tablet by mouth in the morning and at bedtime 60 tablet 5    albuterol sulfate HFA (PROVENTIL;VENTOLIN;PROAIR) 108 (90 Base) MCG/ACT inhaler Inhale 2 puffs into the lungs every 6 hours as needed for Wheezing 18 g 3    amLODIPine (NORVASC) 5 MG tablet       TRAZODONE HCL PO Take by mouth      DULoxetine (CYMBALTA) 30 MG extended release capsule Take 30 mg by mouth daily      ferrous sulfate (IRON 325) 325 (65 Fe) MG tablet Take 325 mg by mouth daily      potassium chloride (KLOR-CON M) 20 MEQ extended release tablet Take 1 tablet by mouth daily      diclofenac-miSOPROStol (ARTHROTEC 75) 75-0.2 MG per tablet Take 1 tablet by mouth 2 times daily      losartan (COZAAR) 50 MG tablet Take 50 mg by mouth daily       dexlansoprazole (DEXILANT) 60 MG CPDR delayed release capsule Take 60 mg by mouth daily      dronedarone hcl (MULTAQ) 400 MG TABS Take 400 mg by mouth 2 times daily (with meals)      nitroGLYCERIN (NITROLINGUAL) 0.4 MG/SPRAY 0.4 mg spray Place 1 spray under the tongue every 5 minutes as needed for Chest pain      CPAP Machine MISC by Does not apply route nightly      apixaban (ELIQUIS) 5 MG TABS tablet Take 1 tablet by mouth 2 times daily 60 tablet 3    rosuvastatin (CRESTOR) 20 MG tablet Take 20 mg by mouth daily. pramipexole (MIRAPEX) 1 MG tablet   Take 0.5 mg by mouth 2 times daily Patient takes 1/2 tablet in the am and 1 1/2 tablet at night. metoprolol (LOPRESSOR) 50 MG tablet Take 50 mg by mouth 2 times daily. modafinil (PROVIGIL) 100 MG tablet Take 100 mg by mouth daily. esomeprazole (NEXIUM) 40 MG capsule Take 40 mg by mouth 2 times daily.        aspirin 81 MG chewable tablet Take 81 mg by mouth daily.        tiZANidine (ZANAFLEX) 4 MG tablet TAKE 1/2 TABLET BY MOUTH THREE TIMES A DAY FOR 2 DAYS, THEN TAKE 1 TABLET BY MOUTH THREE TIMES A DAY AFTER (Patient not taking: No sig reported)  2    zolpidem (AMBIEN) 10 MG tablet Take by mouth nightly as needed for Sleep. (Patient not taking: No sig reported)       No current facility-administered medications for this visit.     Allergies: Ceclor [cefaclor], Celebrex [celecoxib], Pcn [penicillins], and Relafen [nabumetone]  Past Medical History:   Diagnosis Date    Aortic regurgitation 04/03/2012    4/3/2012-mild; 2/7/2011-mild to moderate    Arrhythmia 02/22/2011 2/22/2011-tachycardia episode during cardiac cath-48 HR Holter monitor normal    Blisters of multiple sites 10/05/2016    CAD (coronary artery disease)     sees Dr Molina, prior Dr Coker;    Cardiac pacemaker 04/2001 4/2001-[employer] PPM Integrity DDD Model 5342    Congestive heart failure (HCC)     Family history of coronary artery disease     Father-MI    GERD (gastroesophageal reflux disease)     H/O cardiac catheterization 5/2/2012, 2/22/2011, 3/4/2010, 2/2001 5/2/2012-[facility]-No evid of signif CAD; Normal peripheral angiogram; 2/22/2011-No signif CAD, stent patent, had tachycardic event; 3/4/2010-Severe stenosis and re in-stent stenosis of LAD    H/O cardiovascular stress test 4/16/2012, 2/7/2011 4/16/2012-Normal Lexiscan study.No evid of ischemia. Attenuation artifact in inferior region of myocardium. EF 58%.     H/O cardiovascular stress test 03/04/2015    normal, no ischemia, EF62%    H/O cardiovascular stress test 12/06/2017    EF60% normal study    H/O Doppler ultrasound 02/07/2011 2/7/2011-CAROTID US-Normal    H/O Doppler ultrasound 05/24/2013    periphal normal    H/O Doppler ultrasound 06/18/2014 6/14 Normal    H/O Doppler ultrasound 11/13/15 09/03/15     11/15 WNL 9/15Arterial and Venous Doppler is normal.     H/O echocardiogram 03/04/2015    EF60% Normal LV and systolic function. Trace MR, Aortic, and TR. No other valvulopathy.     H/O echocardiogram 08/08/2016    EF60% mild AR, mild phtn    H/O echocardiogram 11/01/2017    EF55-60% sclerotic, non stenotic AV, mild AR, phtn    H/O peptic ulcer 1970s    Hiatal hernia     History of cardiac monitoring 06/01/2015    Event - Patient noted to have recurrence of atrial fibrillation with RVR on medical therapy on event monitor    History of Doppler carotid echocardiogram 01/15/2020    No hemodynamically significant stenosis in the internal carotid artery bilaterally, Right internal carotid artery exhibits mild homogenous plaque, Left internal exhibits minimal homogenous plaque, totuosity of the bilateral internal carotid arteries    History of Holter monitoring 05/19/2015    24 hour - predominant rhythm was sinus intermittent paced, paced rhythm can not be ruled out d/t pacemaker function being turned off during device setup, limited pacemaker data available    History of nuclear stress test 11/10/2016    lexiscan-normal,EF70%    HX OTHER MEDICAL 05/02/2012 5/2/2012-PERIPHERAL ANGIOGRAM-Normal-Dr Molina    HX OTHER MEDICAL 03/02/2011    3/2/2011-48 HR HOLTER MONITOR-Pred Rhythm is sinus rhythm.No arrhythmias.    Hyperlipidemia     Hypertension     Nuclear Stress 07/07/2021    EF 53%.    S/P angioplasty with stent 3/4/2010, 2/2001    3/4/2010-PTCA with 2.75 X 38 mm stent to LAD; 2/2001-PTCA with stent to LAD    S/P cardiac pacemaker procedure 06/04/2013    battery replacement    Sleep apnea     has CPAP    WD-Chronic venous hypertension (idiopathic) with ulcer of bilateral lower extremity (CODE) (HCC) 2/18/2022    WD-Non-pressure chronic ulcer of other part of left lower leg limited to breakdown of skin (HCC) 2/18/2022    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 (HCC)  2/18/2022     Past Surgical History:   Procedure Laterality Date    ATRIAL ABLATION SURGERY  8/11/15    Atrial fib/flutter ablation     CARDIAC CATHETERIZATION  06/14/2015    no significant disease    CARDIAC PACEMAKER PLACEMENT  4/2001 4/2001-[employer] PPM Integrity DDD Model 5342    CARPAL TUNNEL RELEASE Bilateral 1976    CORONARY ANGIOPLASTY WITH STENT PLACEMENT  3/4/2010, 2/2001    3/4/2010-PTCA with 2.75 X 38 mm stent to LAD; 2/2001-PTCA with stent to LAD;    DIAGNOSTIC CARDIAC CATH LAB PROCEDURE  5/2/2012,2/22/2011, 3/4/2010, 2/2001 5/2/2012-[facility]-No evid of signif CAD; Normal peripheral angiogram; 2/22/2011-No signif CAD, stent patent, had tachycardic event; 3/4/2010-Severe stenosis and re in-stent stenosis of LAD    FINGER TRIGGER RELEASE Left 1994    thumb    HAND TENDON SURGERY Left 1976    Left middle finger    HERNIA REPAIR Bilateral 1993    bilateral inguinal hernias    JOINT REPLACEMENT      knees bilaterally-Right 10/2009; 1/2008-Left    KNEE ARTHROSCOPY  11/2005, 1989 11/2005, 1989-Right knee    LUMBAR DISC ARTHROPLASTY  07/08/2016    OTHER SURGICAL HISTORY  5/2/2012 5/2/2012-PERIPHERAL ANGIOGRAM-Normal    UPPER GASTROINTESTINAL ENDOSCOPY  5/3/2001    5/3/0013-[facility])      As reviewed   Family History   Problem Relation Age of Onset    Heart Disease Mother     High Blood Pressure Mother     Stroke Father     Coronary Art Dis Father         MI    Other Brother         accidental    Brain Cancer Paternal Grandfather      Social History     Tobacco Use    Smoking status: Never    Smokeless tobacco: Never   Substance Use Topics    Alcohol use: No     Comment: caffiene: 1 can daily        Objective:    Vitals:    03/07/23 1636   BP: 124/62   Pulse: 72   Weight: 215 lb 9.6 oz (97.8 kg)   Height: 5' 6\" (1.676 m)     /62   Pulse 72   Ht 5' 6\" (1.676 m)   Wt 215 lb 9.6 oz (97.8 kg)   BMI 34.80 kg/m²     No flowsheet data found.      Wt Readings from Last 3 Encounters: 03/07/23 215 lb 9.6 oz (97.8 kg)   02/07/23 222 lb 3.2 oz (100.8 kg)   12/02/22 222 lb 12.8 oz (101.1 kg)     Body mass index is 34.8 kg/m². GENERAL - Alert, oriented, pleasant, in no apparent distress. EYES: No jaundice, no conjunctival pallor. SKIN: It is warm & dry. No rashes. No Echhymosis    HEENT - No clinically significant abnormalities seen. Neck - Supple. No jugular venous distention noted. No carotid bruits. Cardiovascular - Normal S1 and S2 without obvious murmur or gallop. Extremities - No cyanosis, clubbing, or significant edema. Pulmonary - No respiratory distress. No wheezes or rales. Abdomen - No masses, tenderness, or organomegaly. Musculoskeletal - No significant edema. No joint deformities. No muscle wasting. Neurologic - Cranial nerves II through XII are grossly intact. There were no gross focal neurologic abnormalities.     Lab Review   Lab Results   Component Value Date/Time    CKTOTAL 297 05/01/2012 04:45 AM    CKMB 7.2 05/01/2012 04:45 AM    TROPONINT 0.017 06/11/2015 12:13 PM     BNP:    Lab Results   Component Value Date/Time    BNP 43 12/22/2011 11:00 AM     PT/INR:    Lab Results   Component Value Date    INR 0.92 01/04/2016     No results found for: LABA1C  Lab Results   Component Value Date    WBC 8.7 11/24/2019    HCT 33.6 (L) 11/24/2019    MCV 90.1 11/24/2019     11/24/2019     Lab Results   Component Value Date    CHOL 121 03/24/2017    TRIG 72 03/24/2017    HDL 48 03/24/2017    LDLDIRECT 67 03/24/2017     Lab Results   Component Value Date    ALT 20 11/24/2019    AST 22 11/24/2019     BMP:    Lab Results   Component Value Date/Time     09/06/2022 01:41 PM    K 3.9 09/06/2022 01:41 PM     09/06/2022 01:41 PM    CO2 30 09/06/2022 01:41 PM    BUN 27 09/06/2022 01:41 PM    CREATININE 1.5 09/06/2022 01:41 PM     CMP:   Lab Results   Component Value Date/Time     09/06/2022 01:41 PM    K 3.9 09/06/2022 01:41 PM     09/06/2022 01:41 PM CO2 30 09/06/2022 01:41 PM    BUN 27 09/06/2022 01:41 PM    PROT 6.6 11/24/2019 01:41 PM    PROT 7.2 05/01/2012 01:40 AM     TSH:    Lab Results   Component Value Date/Time    TSHHS 0.925 03/24/2017 10:27 AM           Assessment & Plan:               -     CORONARY ARTERY DISEASE:  symptomatic     All available  tests in chart reviewed. Management discussed . Testing ordered  no          On aspirin      Cardiolite was unremarkable last July       LHC and RHC  offered , pt will DW pulmonary                -  Hypertension: Patients blood pressure is normal. Patient is advised about low sodium diet. Present medical regimen will not be changed. On Norvasc 5 mg p.o. daily losartan 50 mg p.o. daily Lopressor 50 mg p.o. twice daily         - PPM; on carelink       Pacer analysis is reviewed is consistent with normal dual-chamber non-MRI Med[employer]onic pacer function with stable leads and appropriate battery status for the age of the device. Remaining average battery life is 16 months. Device is programmed to DDDR mode lower rate of 60 bpm and 98% pacing in the atrium sensing in the ventricle. Recommend continued every three month check and follow up office visit as scheduled. -  LIPID MANAGEMENT:  Importance of lipid levels discussed with patient   and patient was given dietary advice. NCEP- ATP III guidelines reviewed with patient. -   Ch on Crestor 20 mg p.o. daily anges  in medicines made: No           -Patient also complains of swelling in the lower extremities along with pain as well has been going to the wound clinic was asked to get his circulation checked  venous Doppler for reflux , also arterial Doppler    HAS cvi had ablations                   - Atrial fibrillation, pt is  compliant with meds.  Patient does not have symptoms from atrial fibrillation  On Eliquis 5 mg p.o. daily    -Obstructive sleep apnea on CPAP compliant,     Mortality from the morbid obesity is very high:  Patient's BMI is 38.9 which is very high he was also advised to work on his weight     Body mass index is 34.8 kg/m².     - CVI had abations    Mayer Goodpasture MD    [facility] [place]

## 2023-05-03 ENCOUNTER — TELEPHONE (OUTPATIENT)
Dept: CARDIOLOGY CLINIC | Age: 83
End: 2023-05-03

## 2023-05-03 ENCOUNTER — PROCEDURE VISIT (OUTPATIENT)
Dept: CARDIOLOGY CLINIC | Age: 83
End: 2023-05-03

## 2023-05-03 DIAGNOSIS — Z95.0 CARDIAC PACEMAKER IN SITU: Primary | ICD-10-CM

## 2023-05-05 ENCOUNTER — PROCEDURE VISIT (OUTPATIENT)
Dept: CARDIOLOGY CLINIC | Age: 83
End: 2023-05-05

## 2023-05-05 DIAGNOSIS — Z95.0 CARDIAC PACEMAKER IN SITU: Primary | ICD-10-CM

## 2023-06-19 ENCOUNTER — PROCEDURE VISIT (OUTPATIENT)
Dept: CARDIOLOGY CLINIC | Age: 83
End: 2023-06-19
Payer: MEDICARE

## 2023-06-19 DIAGNOSIS — I87.302 CHRONIC VENOUS HYPERTENSION INVOLVING LEFT SIDE: Primary | ICD-10-CM

## 2023-06-19 PROCEDURE — 93971 EXTREMITY STUDY: CPT | Performed by: INTERNAL MEDICINE

## 2023-06-22 ENCOUNTER — TELEPHONE (OUTPATIENT)
Dept: CARDIOLOGY CLINIC | Age: 83
End: 2023-06-22

## 2023-06-22 NOTE — TELEPHONE ENCOUNTER
Venous doppler:6/19/23      Right CFV and left Popliteal V are patent with good compressibility and   respirophasic signal with good augmentation. The Right GSV is non-compressible with no evidence of flow just past the   saphenofemoral junction to the proximal calf. Left Popliteal vein is patent with good compressibility and respirophasic   signal with good augmentation. The Left SSV is non-compressible with no evidence of flow just past the   saphenopopliteal junction to the distal calf.      Patient verbally understood

## 2023-08-10 ENCOUNTER — TELEPHONE (OUTPATIENT)
Dept: CARDIOLOGY CLINIC | Age: 83
End: 2023-08-10

## 2023-08-10 PROCEDURE — 93296 REM INTERROG EVL PM/IDS: CPT | Performed by: INTERNAL MEDICINE

## 2023-08-10 PROCEDURE — 93294 REM INTERROG EVL PM/LDLS PM: CPT | Performed by: INTERNAL MEDICINE

## 2023-08-11 ENCOUNTER — PROCEDURE VISIT (OUTPATIENT)
Dept: CARDIOLOGY CLINIC | Age: 83
End: 2023-08-11
Payer: MEDICARE

## 2023-08-11 DIAGNOSIS — Z95.0 CARDIAC PACEMAKER IN SITU: Primary | ICD-10-CM

## 2023-09-08 ENCOUNTER — OFFICE VISIT (OUTPATIENT)
Dept: CARDIOLOGY CLINIC | Age: 83
End: 2023-09-08
Payer: MEDICARE

## 2023-09-08 ENCOUNTER — HOSPITAL ENCOUNTER (OUTPATIENT)
Age: 83
Discharge: HOME | End: 2023-09-08
Payer: MEDICARE

## 2023-09-08 VITALS
BODY MASS INDEX: 34.39 KG/M2 | DIASTOLIC BLOOD PRESSURE: 62 MMHG | HEART RATE: 75 BPM | HEIGHT: 66 IN | WEIGHT: 214 LBS | OXYGEN SATURATION: 93 % | SYSTOLIC BLOOD PRESSURE: 124 MMHG

## 2023-09-08 DIAGNOSIS — I48.0 PAF (PAROXYSMAL ATRIAL FIBRILLATION) (HCC): ICD-10-CM

## 2023-09-08 DIAGNOSIS — I25.10 CORONARY ARTERY DISEASE INVOLVING NATIVE CORONARY ARTERY OF NATIVE HEART WITHOUT ANGINA PECTORIS: Primary | ICD-10-CM

## 2023-09-08 DIAGNOSIS — I10 PRIMARY HYPERTENSION: ICD-10-CM

## 2023-09-08 LAB
ANION GAP SERPL CALCULATED.3IONS-SCNC: 9 MMOL/L (ref 4–16)
BUN SERPL-MCNC: 18 MG/DL (ref 6–23)
CALCIUM SERPL-MCNC: 8.6 MG/DL (ref 8.3–10.6)
CHLORIDE BLD-SCNC: 103 MMOL/L (ref 99–110)
CO2: 27 MMOL/L (ref 21–32)
CREAT SERPL-MCNC: 1.4 MG/DL (ref 0.9–1.3)
GFR SERPL CREATININE-BSD FRML MDRD: 50 ML/MIN/1.73M2
GLUCOSE SERPL-MCNC: 117 MG/DL (ref 70–99)
POTASSIUM SERPL-SCNC: 4.1 MMOL/L (ref 3.5–5.1)
SODIUM BLD-SCNC: 139 MMOL/L (ref 135–145)

## 2023-09-08 PROCEDURE — 36415 COLL VENOUS BLD VENIPUNCTURE: CPT

## 2023-09-08 PROCEDURE — 3074F SYST BP LT 130 MM HG: CPT | Performed by: NURSE PRACTITIONER

## 2023-09-08 PROCEDURE — 80048 BASIC METABOLIC PNL TOTAL CA: CPT

## 2023-09-08 PROCEDURE — 1123F ACP DISCUSS/DSCN MKR DOCD: CPT | Performed by: NURSE PRACTITIONER

## 2023-09-08 PROCEDURE — 99214 OFFICE O/P EST MOD 30 MIN: CPT | Performed by: NURSE PRACTITIONER

## 2023-09-08 PROCEDURE — 3078F DIAST BP <80 MM HG: CPT | Performed by: NURSE PRACTITIONER

## 2023-09-08 PROCEDURE — 93000 ELECTROCARDIOGRAM COMPLETE: CPT | Performed by: NURSE PRACTITIONER

## 2023-09-08 RX ORDER — METAXALONE 800 MG/1
TABLET ORAL
COMMUNITY
Start: 2023-08-09

## 2023-09-08 RX ORDER — TADALAFIL 5 MG/1
TABLET ORAL
COMMUNITY
Start: 2023-08-09

## 2023-09-08 ASSESSMENT — ENCOUNTER SYMPTOMS
ORTHOPNEA: 0
SHORTNESS OF BREATH: 1

## 2023-09-11 ENCOUNTER — TELEPHONE (OUTPATIENT)
Dept: CARDIOLOGY CLINIC | Age: 83
End: 2023-09-11

## 2023-09-11 NOTE — TELEPHONE ENCOUNTER
----- Message from Maryetta Claude, APRN - CNP sent at 9/11/2023 12:02 PM EDT -----  Labs are stable   Spoke to pt daughter Patient advised and voices understanding.

## 2023-11-12 PROCEDURE — 93294 REM INTERROG EVL PM/LDLS PM: CPT | Performed by: INTERNAL MEDICINE

## 2023-11-12 PROCEDURE — 93296 REM INTERROG EVL PM/IDS: CPT | Performed by: INTERNAL MEDICINE

## 2023-11-13 ENCOUNTER — PROCEDURE VISIT (OUTPATIENT)
Dept: CARDIOLOGY CLINIC | Age: 83
End: 2023-11-13
Payer: MEDICARE

## 2023-11-13 DIAGNOSIS — Z95.0 CARDIAC PACEMAKER IN SITU: Primary | ICD-10-CM

## 2023-11-27 ENCOUNTER — OFFICE VISIT (OUTPATIENT)
Dept: PULMONOLOGY | Age: 83
End: 2023-11-27
Payer: MEDICARE

## 2023-11-27 VITALS
BODY MASS INDEX: 35.17 KG/M2 | OXYGEN SATURATION: 93 % | DIASTOLIC BLOOD PRESSURE: 60 MMHG | SYSTOLIC BLOOD PRESSURE: 118 MMHG | WEIGHT: 218.8 LBS | HEART RATE: 70 BPM | HEIGHT: 66 IN

## 2023-11-27 DIAGNOSIS — E66.9 OBESITY (BMI 35.0-39.9 WITHOUT COMORBIDITY): ICD-10-CM

## 2023-11-27 DIAGNOSIS — R06.02 SOB (SHORTNESS OF BREATH) ON EXERTION: ICD-10-CM

## 2023-11-27 DIAGNOSIS — G47.10 HYPERSOMNIA: ICD-10-CM

## 2023-11-27 DIAGNOSIS — J45.20 MILD INTERMITTENT REACTIVE AIRWAY DISEASE WITHOUT COMPLICATION: ICD-10-CM

## 2023-11-27 DIAGNOSIS — G47.33 OSA TREATED WITH BIPAP: ICD-10-CM

## 2023-11-27 PROCEDURE — 3078F DIAST BP <80 MM HG: CPT | Performed by: INTERNAL MEDICINE

## 2023-11-27 PROCEDURE — 3074F SYST BP LT 130 MM HG: CPT | Performed by: INTERNAL MEDICINE

## 2023-11-27 PROCEDURE — 1123F ACP DISCUSS/DSCN MKR DOCD: CPT | Performed by: INTERNAL MEDICINE

## 2023-11-27 PROCEDURE — 99214 OFFICE O/P EST MOD 30 MIN: CPT | Performed by: INTERNAL MEDICINE

## 2023-11-27 ASSESSMENT — ENCOUNTER SYMPTOMS
EYE ITCHING: 0
ABDOMINAL PAIN: 0
BACK PAIN: 0
ABDOMINAL DISTENTION: 0
SHORTNESS OF BREATH: 0
EYE DISCHARGE: 0
COUGH: 0

## 2023-11-27 NOTE — PROGRESS NOTES
Shawn Dsouza  1940  Referring Provider: Hernesto Aguilera MD    Subjective:     Chief Complaint   Patient presents with    Follow-up     1 yr F/U -2 Week Compliance Check       DALIA Darby is a 79 y.o. male has come back as a follow up. He has severe JAIMIE on a BIPAP 19/15 mm hg which he is using it every night about 6 to 8 hours. He says that it is helping him. He has no loss of weight. He has a nasal mask. He is not sleepy during the day time. His 2 week download data showed a residual AHI is 1.3 and leak is 22.8 L/min. He is still on Lasix. He has mild Pulmonary HTN. She has occasional SOBOE. He is not on oxygen. He had his flu vaccine. He has no h/o smoking. He has small airway dysfunction. He is not on any inhalers.     Current Outpatient Medications   Medication Sig Dispense Refill    tadalafil (CIALIS) 5 MG tablet       metaxalone (SKELAXIN) 800 MG tablet       fluticasone furoate-vilanterol (BREO ELLIPTA) 200-25 MCG/ACT AEPB inhaler       montelukast (SINGULAIR) 10 MG tablet       torsemide (DEMADEX) 20 MG tablet Take 1 tablet by mouth in the morning and at bedtime 60 tablet 5    albuterol sulfate HFA (PROVENTIL;VENTOLIN;PROAIR) 108 (90 Base) MCG/ACT inhaler Inhale 2 puffs into the lungs every 6 hours as needed for Wheezing 18 g 3    amLODIPine (NORVASC) 5 MG tablet       TRAZODONE HCL PO Take by mouth      potassium chloride (KLOR-CON M) 20 MEQ extended release tablet Take 1 tablet by mouth daily      diclofenac-miSOPROStol (ARTHROTEC 75) 75-0.2 MG per tablet Take 1 tablet by mouth 2 times daily      dronedarone hcl (MULTAQ) 400 MG TABS Take 1 tablet by mouth 2 times daily (with meals)      CPAP Machine MISC by Does not apply route nightly      apixaban (ELIQUIS) 5 MG TABS tablet Take 1 tablet by mouth 2 times daily 60 tablet 3    rosuvastatin (CRESTOR) 20 MG tablet Take 1 tablet by mouth daily      pramipexole (MIRAPEX) 1 MG tablet Take 0.5 tablets by mouth 2 times daily Patient takes 1/2 tablet in the am and

## 2023-12-20 NOTE — PROGRESS NOTES
Wound Care Center Progress Note With Procedure    Yuki Calero  AGE: 79 y.o. GENDER: male  : 1940  EPISODE DATE:  2022     Subjective:     Chief Complaint   Patient presents with    Wound Check     ble         HISTORY of PRESENT ILLNESS      Yuki Calero is a 79 y.o. male who presents to the [address] [place] [address] for a visit for evaluation and treatment of Acute on chronic venous  ulcer(s) of  R lower leg medial, R lower leg lateral, R lower leg anterior, R lower leg posterior, L lower leg medial, L lower leg lateral, L lower leg anterior, L lower leg posterior. The condition is of moderate severity. The ulcer has been present for 2 weeks, but he is beginning to have this as a reoccurring issues. Patient was last seen in this office in March by Dr. Shannon Lugo. The underlying cause is thought to be venous insufficiency. The patients care to date has included nothing so far. The patient has significant underlying medical conditions as below. Great benefit from compression and he has an appointment with [facility] [place] next month when they will discuss venous work up    Nearly healed but wounds to both legs remain.  Very clean and shallow and could be discharged next week  Patient has no compression plans for home when he is done here     Wound Pain Timing/Severity: waxing and waning  Quality of pain: sharp, burning, throbbing, tender  Severity of pain:  5 / 10   Modifying Factors: edema, venous stasis, and obesity  Associated Signs/Symptoms: edema, erythema, drainage, and pain        PAST MEDICAL HISTORY        Diagnosis Date    Aortic regurgitation 2012    4/3/2012-mild; 2011-mild to moderate    Arrhythmia 2011-tachycardia episode during cardiac cath-48 HR Holter monitor normal    Blisters of multiple sites 10/05/2016    CAD (coronary artery disease)     sees Dr Georgi Boo, prior Dr Perri Calhoun;    Cardiac pacemaker 2001-[employer] PPM Integrity DDD Model 5342    Congestive heart failure (HCC)     Family history of coronary artery disease     Father-MI    GERD (gastroesophageal reflux disease)     H/O cardiac catheterization 5/2/2012, 2/22/2011, 3/4/2010, 2/2001 5/2/2012-[facility]-No evid of signif CAD; Normal peripheral angiogram; 2/22/2011-No signif CAD, stent patent, had tachycardic event; 3/4/2010-Severe stenosis and re in-stent stenosis of LAD    H/O cardiovascular stress test 4/16/2012, 2/7/2011 4/16/2012-Normal Lexiscan study. No evid of ischemia. Attenuation artifact in inferior region of myocardium. EF 58%. H/O cardiovascular stress test 03/04/2015    normal, no ischemia, EF62%    H/O cardiovascular stress test 12/06/2017    EF60% normal study    H/O Doppler ultrasound 02/07/2011 2/7/2011-CAROTID US-Normal    H/O Doppler ultrasound 05/24/2013    periphal normal    H/O Doppler ultrasound 06/18/2014 6/14 Normal    H/O Doppler ultrasound 11/13/15 09/03/15    11/15 WNL 9/15Arterial and Venous Doppler is normal.     H/O echocardiogram 03/04/2015    EF60% Normal LV and systolic function. Trace MR, Aortic, and TR. No other valvulopathy.      H/O echocardiogram 08/08/2016    EF60% mild AR, mild phtn    H/O echocardiogram 11/01/2017    EF55-60% sclerotic, non stenotic AV, mild AR, phtn    H/O peptic ulcer 1970s    Hiatal hernia     History of cardiac monitoring 06/01/2015    Event - Patient noted to have recurrence of atrial fibrillation with RVR on medical therapy on event monitor    History of Doppler carotid echocardiogram 01/15/2020    No hemodynamically significant stenosis in the internal carotid artery bilaterally, Right internal carotid artery exhibits mild homogenous plaque, Left internal exhibits minimal homogenous plaque, totuosity of the bilateral internal carotid arteries    History of Holter monitoring 05/19/2015    24 hour - predominant rhythm was sinus intermittent paced, paced rhythm can not be ruled out d/t pacemaker function being turned off during device setup, limited pacemaker data available    History of nuclear stress test 11/10/2016    lexiscan-normal,EF70%    HX OTHER MEDICAL 05/02/2012 5/2/2012-PERIPHERAL ANGIOGRAM-Normal-Dr Molina    HX OTHER MEDICAL 03/02/2011    3/2/2011-48 HR HOLTER MONITOR-Pred Rhythm is sinus rhythm. No arrhythmias. Hyperlipidemia     Hypertension     Nuclear Stress 07/07/2021    EF 53%.     S/P angioplasty with stent 3/4/2010, 2/2001    3/4/2010-PTCA with 2.75 X 38 mm stent to LAD; 2/2001-PTCA with stent to LAD    S/P cardiac pacemaker procedure 06/04/2013    battery replacement    Sleep apnea     has CPAP    WD-Chronic venous hypertension (idiopathic) with ulcer of bilateral lower extremity (CODE) ([address] 2/18/2022    WD-Non-pressure chronic ulcer of other part of left lower leg limited to breakdown of skin ([address] 2/18/2022    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 ([address] 2/18/2022       PAST SURGICAL HISTORY    Past Surgical History:   Procedure Laterality Date    ATRIAL ABLATION SURGERY  8/11/15    Atrial fib/flutter ablation     CARDIAC CATHETERIZATION  06/14/2015    no significant disease    CARDIAC PACEMAKER PLACEMENT  4/2001 4/2001-[employer] PPM Integrity DDD Model 5342    CARPAL TUNNEL RELEASE Bilateral 1976    CORONARY ANGIOPLASTY WITH STENT PLACEMENT  3/4/2010, 2/2001    3/4/2010-PTCA with 2.75 X 38 mm stent to LAD; 2/2001-PTCA with stent to LAD;    DIAGNOSTIC CARDIAC CATH LAB PROCEDURE  5/2/2012,2/22/2011, 3/4/2010, 2/2001 5/2/2012-[facility]-No evid of signif CAD; Normal peripheral angiogram; 2/22/2011-No signif CAD, stent patent, had tachycardic event; 3/4/2010-Severe stenosis and re in-stent stenosis of LAD    FINGER TRIGGER RELEASE Left 1994    thumb    HAND TENDON SURGERY Left 1976    Left middle finger    HERNIA REPAIR Bilateral 1993    bilateral inguinal hernias    JOINT REPLACEMENT      knees bilaterally-Right 10/2009; 1/2008-Left    KNEE ARTHROSCOPY  11/2005, 1989 11/2005, 1989-Right knee    LUMBAR DISC ARTHROPLASTY  07/08/2016    OTHER SURGICAL HISTORY  5/2/2012 5/2/2012-PERIPHERAL ANGIOGRAM-Normal    UPPER GASTROINTESTINAL ENDOSCOPY  5/3/2001    5/3/1322-[facility]At [facility])       FAMILY HISTORY    Family History   Problem Relation Age of Onset    Heart Disease Mother     High Blood Pressure Mother     Stroke Father     Coronary Art Dis Father         MI    Other Brother         accidental    Brain Cancer Paternal Grandfather        SOCIAL HISTORY    Social History     Tobacco Use    Smoking status: Never    Smokeless tobacco: Never   Vaping Use    Vaping Use: Never used   Substance Use Topics    Alcohol use: No     Alcohol/week: 0.0 standard drinks    Drug use: No       ALLERGIES    Allergies   Allergen Reactions    Ceclor [Cefaclor] Rash    Celebrex [Celecoxib] Rash    Pcn [Penicillins] Rash    Relafen [Nabumetone] Rash       MEDICATIONS    Current Outpatient Medications on File Prior to Encounter   Medication Sig Dispense Refill    torsemide (DEMADEX) 20 MG tablet Take 1 tablet by mouth in the morning and at bedtime 60 tablet 3    amLODIPine (NORVASC) 5 MG tablet       TRAZODONE HCL PO Take by mouth      DULoxetine (CYMBALTA) 30 MG extended release capsule Take 30 mg by mouth daily      ferrous sulfate (IRON 325) 325 (65 Fe) MG tablet Take 325 mg by mouth daily      potassium chloride (KLOR-CON M) 20 MEQ extended release tablet Take 1 tablet by mouth daily      diclofenac-miSOPROStol (ARTHROTEC 75) 75-0.2 MG per tablet Take 1 tablet by mouth 2 times daily      tiZANidine (ZANAFLEX) 4 MG tablet TAKE 1/2 TABLET BY MOUTH THREE TIMES A DAY FOR 2 DAYS, THEN TAKE 1 TABLET BY MOUTH THREE TIMES A DAY AFTER  2    losartan (COZAAR) 50 MG tablet Take 50 mg by mouth daily       dexlansoprazole (DEXILANT) 60 MG CPDR delayed release capsule Take 60 mg by mouth daily      zolpidem (AMBIEN) 10 MG tablet Take by mouth nightly as needed for Sleep      dronedarone hcl ([facility]) 400 MG TABS Take 400 mg by mouth 2 times daily (with meals)      nitroGLYCERIN (NITROLINGUAL) 0.4 MG/SPRAY 0.4 mg spray Place 1 spray under the tongue every 5 minutes as needed for Chest pain      CPAP Machine MISC by Does not apply route nightly      apixaban (ELIQUIS) 5 MG TABS tablet Take 1 tablet by mouth 2 times daily 60 tablet 3    rosuvastatin (CRESTOR) 20 MG tablet Take 20 mg by mouth daily. pramipexole (MIRAPEX) 1 MG tablet   Take 0.5 mg by mouth 2 times daily Patient takes 1/2 tablet in the am and 1 1/2 tablet at night. metoprolol (LOPRESSOR) 50 MG tablet Take 50 mg by mouth 2 times daily. modafinil (PROVIGIL) 100 MG tablet Take 100 mg by mouth daily. esomeprazole (NEXIUM) 40 MG capsule Take 40 mg by mouth 2 times daily. aspirin 81 MG chewable tablet Take 81 mg by mouth daily. No current facility-administered medications on file prior to encounter. REVIEW OF SYSTEMS    Pertinent items are noted in HPI. Constitutional: Negative for systemic symptoms including fever, chills and malaise. Objective:      /63   Pulse 70   Temp 99.4 °F (37.4 °C) (Temporal)   Resp 20     PHYSICAL EXAM      General: The patient is in no acute distress. Mental status:  Patient is appropriate, is  oriented to place and plan of care.   Dermatologic exam: Visual inspection of the periwound reveals the skin to be normal in turgor and texture, dry, edematous, and slack  Wound exam: see wound description below in procedure note      Assessment:     Problem List Items Addressed This Visit          Circulatory    WD-Chronic venous hypertension (idiopathic) with ulcer of bilateral lower extremity (CODE) (HCC) - Primary    Relevant Medications    gentamicin (GARAMYCIN) 0.1 % ointment (Start on 9/16/2022 11:45 AM)    Other Relevant Orders    Initiate Outpatient Wound Care Protocol       Other    WD-Non-pressure chronic ulcer of other part of left lower leg limited to breakdown of skin ([address]    Relevant Medications    gentamicin (GARAMYCIN) 0.1 % ointment (Start on 9/16/2022 11:45 AM)    Other Relevant Orders    Initiate Outpatient Wound Care Protocol    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 ([address]    Relevant Medications    gentamicin (GARAMYCIN) 0.1 % ointment (Start on 9/16/2022 11:45 AM)    Other Relevant Orders    Initiate Outpatient Wound Care Protocol     Procedure Note    Indications:  Based on my examination of this patient's wound(s) today, sharp excision into necrotic subcutaneous tissue is required to promote healing and evaluate the extent of previous healing. Performed by: MALLORY Wilson CNP    Consent obtained: Yes    Time out taken:  Yes    Pain Control: N/A      Debridement:Excisional Debridement    Using #15 blade scalpel the wound(s) was/were sharply debrided down through and including the removal of subcutaneous tissue.         Devitalized Tissue Debrided:  fibrin, biofilm, slough, exudate, and callus    Pre Debridement Measurements:  Are located in the Wound Documentation Flow Sheet    All active wounds listed below with today's date are evaluated  Wound(s)    debrided this date include # : 2, 3, and 4     Post  Debridement Measurements:  Wound 10/05/16 Venous ulcer WOUND #1 RT PROXIMAL LEG(ONSET 2 MTHS) (Active)   Number of days: 2171       Wound 10/05/16 Venous ulcer WOUND #2 RT LATERAL LEG CLUSTER (ONSET 2MTHS) (Active)   Number of days: 2171       Wound 10/05/16 Venous ulcer WOUND #3RT DISTAL LEG(ONSET 2 MTHS) (Active)   Number of days: 2171       Wound 10/05/16 Venous ulcer WOUND 4 RT MEDIAL LEG (ONSET 2 MTHS) (Active)   Number of days: 2171       Wound 09/02/22 Tibial Distal;Right;Posterior #2 (Active)   Wound Image   09/02/22 1135   Wound Etiology Venous 09/16/22 1103   Dressing Status New dressing applied 09/09/22 1202   Wound Cleansed Soap and water 09/16/22 1103   Offloading for Diabetic Foot Ulcers Offloading not required 09/16/22 1103   Wound Length (cm) 1 cm 09/16/22 1103   Wound Width (cm) 1 cm 09/16/22 1103   Wound Depth (cm) 0.1 cm 09/16/22 1103   Wound Surface Area (cm^2) 1 cm^2 09/16/22 1103   Change in Wound Size % (l*w) -300 09/16/22 1103   Wound Volume (cm^3) 0.1 cm^3 09/16/22 1103   Wound Healing % -300 09/16/22 1103   Post-Procedure Length (cm) 1 cm 09/16/22 1116   Post-Procedure Width (cm) 1 cm 09/16/22 1116   Post-Procedure Depth (cm) 0.1 cm 09/16/22 1116   Post-Procedure Surface Area (cm^2) 1 cm^2 09/16/22 1116   Post-Procedure Volume (cm^3) 0.1 cm^3 09/16/22 1116   Distance Tunneling (cm) 0 cm 09/16/22 1103   Tunneling Position ___ O'Clock 0 09/16/22 1103   Undermining Starts ___ O'Clock 0 09/16/22 1103   Undermining Ends___ O'Clock 0 09/16/22 1103   Undermining Maxium Distance (cm) 0 09/16/22 1103   Wound Assessment Pink/red;Pale granulation tissue 09/16/22 1103   Drainage Amount Moderate 09/16/22 1116   Drainage Description Green 09/16/22 1103   Odor None 09/16/22 1103   Kiki-wound Assessment Fragile 09/16/22 1103   Margins Defined edges 09/16/22 1103   Wound Thickness Description not for Pressure Injury Full thickness 09/16/22 1103   Number of days: 13       Wound 09/02/22 Pretibial Right;Lateral #3 cluster (Active)   Wound Image   09/02/22 1135   Wound Etiology Venous 09/16/22 1103   Dressing Status New dressing applied 09/09/22 1202   Wound Cleansed Soap and water 09/16/22 1103   Offloading for Diabetic Foot Ulcers Offloading not required 09/16/22 1103   Wound Length (cm) 0.1 cm 09/16/22 1103   Wound Width (cm) 0.1 cm 09/16/22 1103   Wound Depth (cm) 0.1 cm 09/16/22 1103   Wound Surface Area (cm^2) 0.01 cm^2 09/16/22 1103   Change in Wound Size % (l*w) 99.83 09/16/22 1103   Wound Volume (cm^3) 0.001 cm^3 09/16/22 1103   Wound Healing % 100 09/16/22 1103   Post-Procedure Length (cm) 0.1 cm 09/16/22 1116   Post-Procedure Width (cm) 0.1 cm 09/16/22 1116   Post-Procedure Depth (cm) 0.1 cm 09/16/22 1116   Post-Procedure Surface Area (cm^2) 0.01 cm^2 09/16/22 1116   Post-Procedure Volume (cm^3) 0.001 cm^3 09/16/22 1116   Distance Tunneling (cm) 0 cm 09/16/22 1103   Tunneling Position ___ O'Clock 0 09/16/22 1103   Undermining Starts ___ O'Clock 0 09/16/22 1103   Undermining Ends___ O'Clock 0 09/16/22 1103   Undermining Maxium Distance (cm) 0 09/16/22 1103   Wound Assessment Dry;Pink/red 09/16/22 1103   Drainage Amount None 09/16/22 1103   Drainage Description Serous; Yellow 09/09/22 1125   Odor None 09/16/22 1103   Kiki-wound Assessment Fragile; Intact 09/16/22 1103   Margins Attached edges 09/16/22 1103   Wound Thickness Description not for Pressure Injury Full thickness 09/16/22 1103   Number of days: 13       Wound 09/02/22 Pretibial Left;Lateral #4 (Active)   Wound Image   09/02/22 1135   Wound Etiology Venous 09/16/22 1103   Dressing Status New dressing applied 09/09/22 1202   Wound Cleansed Soap and water 09/16/22 1103   Offloading for Diabetic Foot Ulcers Offloading not required 09/16/22 1103   Wound Length (cm) 0.5 cm 09/16/22 1103   Wound Width (cm) 0.8 cm 09/16/22 1103   Wound Depth (cm) 0.1 cm 09/16/22 1103   Wound Surface Area (cm^2) 0.4 cm^2 09/16/22 1103   Change in Wound Size % (l*w) 4.76 09/16/22 1103   Wound Volume (cm^3) 0.04 cm^3 09/16/22 1103   Wound Healing % 5 09/16/22 1103   Post-Procedure Length (cm) 0.5 cm 09/16/22 1116   Post-Procedure Width (cm) 0.8 cm 09/16/22 1116   Post-Procedure Depth (cm) 0.1 cm 09/16/22 1116   Post-Procedure Surface Area (cm^2) 0.4 cm^2 09/16/22 1116   Post-Procedure Volume (cm^3) 0.04 cm^3 09/16/22 1116   Distance Tunneling (cm) 0 cm 09/16/22 1103   Tunneling Position ___ O'Clock 0 09/16/22 1103   Undermining Starts ___ O'Clock 0 09/16/22 1103   Undermining Ends___ O'Clock 0 09/16/22 1103   Undermining Maxium Distance (cm) 0 09/16/22 1103   Wound Assessment Horine/red;Slough 09/16/22 1103   Drainage Amount Moderate 09/16/22 1116   Drainage Description Green 09/16/22 1103   Odor None 09/16/22 1103   Kiki-wound Assessment Fragile 09/16/22 1103   Margins Defined edges 09/16/22 1103   Wound Thickness Description not for Pressure Injury Full thickness 09/16/22 1103   Number of days: 13       Percent of Wound(s) Debrided: approximately 100%    Total  Area  Debrided:  1.10 sq cm     Bleeding:  Minimal    Hemostasis Achieved:  by pressure    Procedural Pain:  0  / 10     Post Procedural Pain:  0 / 10     Response to treatment:  Well tolerated by patient. Status of wound progress and description from last visit:   Will order juxta-lites this week  Hope to discharge next week. Plan:       Discharge Instructions         PHYSICIAN ORDERS AND DISCHARGE INSTRUCTIONS     NOTE: Upon discharge from the [address], you will receive a patient experience survey. We would be grateful if you would take the time to fill this survey out. Wound care order history:                 HORTENSIA's   Right  1.36     Left 1.29            Date: 9/2/2022              Cultures:                Grafts:                Antibiotics:           Continuing wound care orders and information:                 Residence:  Private               Continue home health care with: None at this time               Your wound-care supplies will be provided by: Wound cleansing:               Do not scrub or use excessive force. Wash hands with soap and water before and after dressing changes. Prior to applying a clean dressing, cleanse wound with normal saline, wound cleanser, or mild soap and water. Ask the physician or nurse before getting the wound(s) wet in a shower     Daily Wound management:              Keep weight off wounds and reposition every 2 hours. Avoid standing for long periods of time. Apply wraps/stockings in AM and remove at bedtime.               If swelling is present, elevate legs to the level of the heart or above for 30 minutes 4-5 times a day and/or when sitting. When taking antibiotics take entire prescription as ordered by physician do not stop taking until medicine is all gone. Orders for this week: 9/16/22                  Venous studies ordered 9/2/22     Rx: [address]     Bilateral Lower Extremities - Wash with soap and water, pat dry. Apply Gentamicin to wound beds. Cover wounds and seeping areas with Large Sorbex. Wrap with Coban 2 Lite. Leave in place for 1 week. Juxtalites ordered 9/16/22  Heel to knee measurement - 42cm        Follow up with Juliana Grossman CNP in 1 week in the wound care center. Call [phone] for any questions or concerns.   Date__________   Time____________        Treatment Note      Written Patient Dismissal Instructions Given            Electronically signed by Madelene Cogan, APRN - CNP on 9/16/2022 at 11:33 AM Detail Level: Detailed General Sunscreen Counseling: I recommended a broad spectrum sunscreen with a SPF of 30 or higher.  I explained that SPF 30 sunscreens block approximately 97 percent of the sun's harmful rays.  Sunscreens should be applied at least 15 minutes prior to expected sun exposure and then every 2 hours after that as long as sun exposure continues. If swimming or exercising sunscreen should be reapplied every 45 minutes to an hour after getting wet or sweating.  One ounce, or the equivalent of a shot glass full of sunscreen, is adequate to protect the skin not covered by a bathing suit. I also recommended a lip balm with a sunscreen as well. Sun protective clothing can be used in lieu of sunscreen but must be worn the entire time you are exposed to the sun's rays.

## 2024-02-20 PROCEDURE — 93296 REM INTERROG EVL PM/IDS: CPT | Performed by: INTERNAL MEDICINE

## 2024-02-20 PROCEDURE — 93294 REM INTERROG EVL PM/LDLS PM: CPT | Performed by: INTERNAL MEDICINE

## 2024-02-21 ENCOUNTER — PROCEDURE VISIT (OUTPATIENT)
Dept: CARDIOLOGY CLINIC | Age: 84
End: 2024-02-21
Payer: MEDICARE

## 2024-02-21 DIAGNOSIS — Z95.0 CARDIAC PACEMAKER IN SITU: Primary | ICD-10-CM

## 2024-03-08 ENCOUNTER — TELEPHONE (OUTPATIENT)
Dept: CARDIOLOGY CLINIC | Age: 84
End: 2024-03-08

## 2024-03-08 ENCOUNTER — HOSPITAL ENCOUNTER (EMERGENCY)
Age: 84
Discharge: HOME | End: 2024-03-08
Attending: EMERGENCY MEDICINE
Payer: MEDICARE

## 2024-03-08 ENCOUNTER — APPOINTMENT (OUTPATIENT)
Dept: GENERAL RADIOLOGY | Age: 84
End: 2024-03-08
Payer: MEDICARE

## 2024-03-08 VITALS
HEART RATE: 68 BPM | TEMPERATURE: 98.8 F | HEIGHT: 66 IN | DIASTOLIC BLOOD PRESSURE: 68 MMHG | WEIGHT: 212 LBS | RESPIRATION RATE: 16 BRPM | BODY MASS INDEX: 34.07 KG/M2 | SYSTOLIC BLOOD PRESSURE: 160 MMHG | OXYGEN SATURATION: 96 %

## 2024-03-08 DIAGNOSIS — T18.9XXA SWALLOWED FOREIGN BODY, INITIAL ENCOUNTER: Primary | ICD-10-CM

## 2024-03-08 PROCEDURE — 99283 EMERGENCY DEPT VISIT LOW MDM: CPT

## 2024-03-08 PROCEDURE — 74019 RADEX ABDOMEN 2 VIEWS: CPT

## 2024-03-08 RX ORDER — TRAZODONE HYDROCHLORIDE 100 MG/1
TABLET ORAL
COMMUNITY
Start: 2024-01-05 | End: 2024-03-08 | Stop reason: ALTCHOICE

## 2024-03-08 ASSESSMENT — PAIN - FUNCTIONAL ASSESSMENT: PAIN_FUNCTIONAL_ASSESSMENT: NONE - DENIES PAIN

## 2024-03-08 NOTE — ED NOTES
Discharge instructions were reviewed and the patient will follow up with the PCP. The patient and family voiced understanding.

## 2024-03-08 NOTE — DISCHARGE INSTRUCTIONS
Return to the emergency department if severe abdominal pain or heavy rectal bleeding or vomiting.  Take milk of magnesia capful every night for couple nights

## 2024-03-08 NOTE — DISCHARGE INSTR - COC
Hosp)       Immunization History:   Immunization History   Administered Date(s) Administered    Influenza, Triv, 3 Years and older, IM (Afluria (5 yrs and older) 01/18/2017    Pneumococcal Conjugate 7-valent (Prevnar7) 05/06/2004       Active Problems:  Patient Active Problem List   Diagnosis Code    History of PTCA Z98.61    Hyperlipidemia E78.5    Hypertension I10    Sleep apnea G47.30    Claudication ([facility]) I73.9    Pacer at end of battery life Z45.010    SSS (sick sinus syndrome) ([facility]) I49.5    Swelling of calf M79.89    CAD (coronary artery disease) I25.10    PAF (paroxysmal atrial fibrillation) ([facility]) I48.0    Tachycardia R00.0    Congestive heart failure ([facility]) I50.9    Pacemaker Z95.0    Atrial fibrillation ([facility]) I48.91    History of cardiac monitoring Z92.89    S/P ablation of atrial fibrillation Z98.890, Z86.79    S/P ablation of atrial flutter Z98.890, Z86.79    Hematuria R31.9    Abdominal discomfort R10.9    Blisters of multiple sites R23.8    Syncope and collapse R55    JAIMIE treated with BiPAP G47.33    Obesity (BMI 35.0-39.9 without comorbidity) E66.9    SOB (shortness of breath) on exertion R06.02    Pulmonary hypertension ([facility]) I27.20    Venous stasis I87.8    WD-Chronic venous hypertension (idiopathic) with ulcer of bilateral lower extremity (CODE) ([facility]) I87.313    WD-Non-pressure chronic ulcer of other part of left lower leg limited to breakdown of skin ([facility]) L97.821    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 ([facility]) L97.812    Hypersomnia G47.10    WD-Venous stasis ulcer of right calf with fat layer exposed ([facility]) I83.012, L97.212    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

## 2024-03-08 NOTE — ED PROVIDER NOTES
well nourished, obese in no acute distress, obese , has a cane at his bedside eyes:  Sclera nonicteric, conjunctiva moist  HENT:  Atraumatic,nose normal.   Neck: supple, no JVD  Respiratory:  No respiratory distress, normal breath sounds   Cardiovascular:  Normal rate, normal rhythm,   GI: Abdomen is obese there is minimal epigastric tenderness no rebound or guarding bowel sounds are decreased, no guarding,   Musculoskeletal:  No edema, no deformity   Integument: no rash, dry skin  Neurologic:  Alert & oriented, normal speech  Psychiatric: Cooperative, pleasant affect       RADIOLOGY/PROCEDURES    XR ABDOMEN (2 VIEWS)   Final Result       1.  Foreign body projecting over the stomach, compatible with provided history of swallowed dental bridge.      Electronically signed by Marek Sherwood MD        Calcified foreign body identified below the diaphragm consistent with the dental bridge    ED COURSE & MEDICAL DECISION MAKING    Pertinent Labs & Imaging studies reviewed and interpreted. (See chart for details)   Medications - No data to display      See chart for details of medications given during the ED stay.    Vitals:    03/08/24 1139 03/08/24 1151   BP: (!) 160/68    Pulse: 68 68   Resp: 16    Temp: 98.8 °F (37.1 °C)    TempSrc: Temporal    SpO2: 96%    Weight: 96.2 kg (212 lb)    Height: 1.676 m (5' 6\")      Patient with a foreign body in the stomach has been advised to take some laxative and watch for the foreign body to pass through.  Return to emergency department if he has a severe abdominal pain heavy rectal bleeding or vomiting  Differential diagnosis: Abdominal Aortic Aneurysm, Ischemic Bowel, Bowel Obstruction, Acute Cholecystitis, Acute Appendicitis, other    FINAL IMPRESSION    1. Swallowed foreign body, initial encounter        Discharged      (Please note that this note was completed with a voice recognition program.  Every attempt was made to edit the dictations, but inevitably there remain words that

## 2024-03-08 NOTE — ED NOTES
The patient presents to the er today with concerns that he swallowed his \" bridge with 4 teeth.\"  The patient is awake and alert and not in any distress. The call light is within reach and family are at the bedside.

## 2024-03-11 ENCOUNTER — OFFICE VISIT (OUTPATIENT)
Dept: CARDIOLOGY CLINIC | Age: 84
End: 2024-03-11
Payer: MEDICARE

## 2024-03-11 VITALS
BODY MASS INDEX: 35.2 KG/M2 | HEART RATE: 80 BPM | WEIGHT: 219 LBS | DIASTOLIC BLOOD PRESSURE: 68 MMHG | SYSTOLIC BLOOD PRESSURE: 124 MMHG | HEIGHT: 66 IN

## 2024-03-11 DIAGNOSIS — I48.0 PAROXYSMAL ATRIAL FIBRILLATION (HCC): ICD-10-CM

## 2024-03-11 DIAGNOSIS — I25.10 CORONARY ARTERY DISEASE INVOLVING NATIVE CORONARY ARTERY OF NATIVE HEART WITHOUT ANGINA PECTORIS: Primary | ICD-10-CM

## 2024-03-11 PROCEDURE — G8484 FLU IMMUNIZE NO ADMIN: HCPCS | Performed by: NURSE PRACTITIONER

## 2024-03-11 PROCEDURE — G8427 DOCREV CUR MEDS BY ELIG CLIN: HCPCS | Performed by: NURSE PRACTITIONER

## 2024-03-11 PROCEDURE — 1036F TOBACCO NON-USER: CPT | Performed by: NURSE PRACTITIONER

## 2024-03-11 PROCEDURE — G8417 CALC BMI ABV UP PARAM F/U: HCPCS | Performed by: NURSE PRACTITIONER

## 2024-03-11 PROCEDURE — 93000 ELECTROCARDIOGRAM COMPLETE: CPT | Performed by: NURSE PRACTITIONER

## 2024-03-11 PROCEDURE — 3074F SYST BP LT 130 MM HG: CPT | Performed by: NURSE PRACTITIONER

## 2024-03-11 PROCEDURE — 1123F ACP DISCUSS/DSCN MKR DOCD: CPT | Performed by: NURSE PRACTITIONER

## 2024-03-11 PROCEDURE — 99214 OFFICE O/P EST MOD 30 MIN: CPT | Performed by: NURSE PRACTITIONER

## 2024-03-11 PROCEDURE — 3078F DIAST BP <80 MM HG: CPT | Performed by: NURSE PRACTITIONER

## 2024-03-11 RX ORDER — POTASSIUM CHLORIDE 1500 MG/1
20 TABLET, EXTENDED RELEASE ORAL ONCE
COMMUNITY
Start: 2024-01-22 | End: 2024-03-11 | Stop reason: SDUPTHER

## 2024-03-11 ASSESSMENT — ENCOUNTER SYMPTOMS
ORTHOPNEA: 0
SHORTNESS OF BREATH: 0

## 2024-03-11 NOTE — PROGRESS NOTES
3/11/2024  Primary cardiologist: Dr. Molina    CC:   Angela  is an established 83 y.o.  male here for a 6 month follow up on cad      SUBJECTIVE/OBJECTIVE:  Angela is a 83 y.o. male with a history of coronary artery disease, hypertension, hyperlipidemia, dual chamber pacemaker,  atrial fibrillation, ablation of atrial fibrillation, ablation of atrial flutter and CVI s/p ablation       HPI :   Angela is here today with his son.  He tells me overall feeling fairly well.  Was seen in the emergency room recently as he swallowed his partial denture bridge.  He is currently on a liquid diet to see if he can get it to pass through.  On a cardiac standpoint he has had no chest pain.  He has chronic shortness of breath meds unchanged.  He is walking with a cane for stability and denies falls.  No hematuria, hematochezia or melena.    Review of Systems   Constitutional: Negative for diaphoresis and malaise/fatigue.   Cardiovascular:  Positive for leg swelling. Negative for chest pain, claudication, dyspnea on exertion, irregular heartbeat, near-syncope, orthopnea, palpitations and paroxysmal nocturnal dyspnea.   Respiratory:  Negative for shortness of breath.    Neurological:  Negative for dizziness and light-headedness.       Vitals:    03/11/24 1342   BP: 124/68   Pulse: 80   Weight: 99.3 kg (219 lb)   Height: 1.676 m (5' 6\")     Wt Readings from Last 3 Encounters:   03/11/24 99.3 kg (219 lb)   03/08/24 96.2 kg (212 lb)   11/27/23 99.2 kg (218 lb 12.8 oz)      Body mass index is 35.35 kg/m².     Physical Exam  Vitals reviewed.   Constitutional:       Appearance: He is obese.   Eyes:      Pupils: Pupils are equal, round, and reactive to light.   Neck:      Vascular: No carotid bruit.   Cardiovascular:      Rate and Rhythm: Normal rate and regular rhythm.      Pulses: Normal pulses.   Pulmonary:      Effort: Pulmonary effort is normal.      Breath sounds: Normal breath sounds.   Chest:      Comments: Left sided device pocket

## 2024-04-22 RX ORDER — TORSEMIDE 20 MG/1
20 TABLET ORAL 2 TIMES DAILY
Qty: 60 TABLET | Refills: 5 | Status: SHIPPED | OUTPATIENT
Start: 2024-04-22

## 2024-05-09 ENCOUNTER — HOSPITAL ENCOUNTER (OUTPATIENT)
Dept: WOUND CARE | Age: 84
Discharge: HOME | End: 2024-05-09
Attending: NURSE PRACTITIONER
Payer: MEDICARE

## 2024-05-09 VITALS
HEART RATE: 80 BPM | RESPIRATION RATE: 18 BRPM | TEMPERATURE: 98.3 F | SYSTOLIC BLOOD PRESSURE: 143 MMHG | DIASTOLIC BLOOD PRESSURE: 77 MMHG

## 2024-05-09 DIAGNOSIS — L97.212 VENOUS STASIS ULCER OF RIGHT CALF WITH FAT LAYER EXPOSED, UNSPECIFIED WHETHER VARICOSE VEINS PRESENT (HCC): Primary | ICD-10-CM

## 2024-05-09 DIAGNOSIS — I83.012 VENOUS STASIS ULCER OF RIGHT CALF WITH FAT LAYER EXPOSED, UNSPECIFIED WHETHER VARICOSE VEINS PRESENT (HCC): Primary | ICD-10-CM

## 2024-05-09 DIAGNOSIS — I87.313 CHRONIC VENOUS HYPERTENSION (IDIOPATHIC) WITH ULCER OF BILATERAL LOWER EXTREMITY (CODE) (HCC): ICD-10-CM

## 2024-05-09 PROCEDURE — 11042 DBRDMT SUBQ TIS 1ST 20SQCM/<: CPT | Performed by: NURSE PRACTITIONER

## 2024-05-09 PROCEDURE — 99213 OFFICE O/P EST LOW 20 MIN: CPT

## 2024-05-09 PROCEDURE — 97597 DBRDMT OPN WND 1ST 20 CM/<: CPT

## 2024-05-09 PROCEDURE — 99213 OFFICE O/P EST LOW 20 MIN: CPT | Performed by: NURSE PRACTITIONER

## 2024-05-09 RX ORDER — BACITRACIN ZINC AND POLYMYXIN B SULFATE 500; 1000 [USP'U]/G; [USP'U]/G
OINTMENT TOPICAL ONCE
OUTPATIENT
Start: 2024-05-09 | End: 2024-05-09

## 2024-05-09 RX ORDER — LIDOCAINE HYDROCHLORIDE 20 MG/ML
JELLY TOPICAL ONCE
OUTPATIENT
Start: 2024-05-09 | End: 2024-05-09

## 2024-05-09 RX ORDER — BACITRACIN ZINC 500 [USP'U]/G
OINTMENT TOPICAL ONCE
OUTPATIENT
Start: 2024-05-09 | End: 2024-05-09

## 2024-05-09 RX ORDER — LIDOCAINE 40 MG/G
CREAM TOPICAL ONCE
OUTPATIENT
Start: 2024-05-09 | End: 2024-05-09

## 2024-05-09 RX ORDER — LIDOCAINE 50 MG/G
OINTMENT TOPICAL ONCE
OUTPATIENT
Start: 2024-05-09 | End: 2024-05-09

## 2024-05-09 RX ORDER — SODIUM CHLOR/HYPOCHLOROUS ACID 0.033 %
SOLUTION, IRRIGATION IRRIGATION ONCE
OUTPATIENT
Start: 2024-05-09 | End: 2024-05-09

## 2024-05-09 RX ORDER — BETAMETHASONE DIPROPIONATE 0.5 MG/G
CREAM TOPICAL ONCE
OUTPATIENT
Start: 2024-05-09 | End: 2024-05-09

## 2024-05-09 RX ORDER — TRIAMCINOLONE ACETONIDE 1 MG/G
OINTMENT TOPICAL ONCE
OUTPATIENT
Start: 2024-05-09 | End: 2024-05-09

## 2024-05-09 RX ORDER — CLOBETASOL PROPIONATE 0.5 MG/G
OINTMENT TOPICAL ONCE
OUTPATIENT
Start: 2024-05-09 | End: 2024-05-09

## 2024-05-09 RX ORDER — DOXYCYCLINE HYCLATE 100 MG
100 TABLET ORAL 2 TIMES DAILY
Qty: 20 TABLET | Refills: 0 | Status: SHIPPED | OUTPATIENT
Start: 2024-05-09 | End: 2024-05-19

## 2024-05-09 RX ORDER — IBUPROFEN 200 MG
TABLET ORAL ONCE
OUTPATIENT
Start: 2024-05-09 | End: 2024-05-09

## 2024-05-09 RX ORDER — GENTAMICIN SULFATE 1 MG/G
OINTMENT TOPICAL ONCE
OUTPATIENT
Start: 2024-05-09 | End: 2024-05-09

## 2024-05-09 RX ORDER — LIDOCAINE HYDROCHLORIDE 40 MG/ML
SOLUTION TOPICAL ONCE
OUTPATIENT
Start: 2024-05-09 | End: 2024-05-09

## 2024-05-09 ASSESSMENT — PAIN - FUNCTIONAL ASSESSMENT: PAIN_FUNCTIONAL_ASSESSMENT: PREVENTS OR INTERFERES SOME ACTIVE ACTIVITIES AND ADLS

## 2024-05-09 ASSESSMENT — PAIN DESCRIPTION - ORIENTATION: ORIENTATION: RIGHT

## 2024-05-09 ASSESSMENT — PAIN DESCRIPTION - FREQUENCY: FREQUENCY: CONTINUOUS

## 2024-05-09 ASSESSMENT — PAIN DESCRIPTION - LOCATION: LOCATION: LEG

## 2024-05-09 ASSESSMENT — PAIN SCALES - GENERAL: PAINLEVEL_OUTOF10: 9

## 2024-05-09 ASSESSMENT — PAIN DESCRIPTION - DESCRIPTORS: DESCRIPTORS: BURNING;THROBBING

## 2024-05-09 ASSESSMENT — PAIN DESCRIPTION - PAIN TYPE: TYPE: ACUTE PAIN

## 2024-05-09 ASSESSMENT — PAIN DESCRIPTION - ONSET: ONSET: ON-GOING

## 2024-05-09 NOTE — PROGRESS NOTES
.Multilayer Compression Wrap   (Not Unna) Below the Knee    NAME:  Angela Vega  YOB: 1940  MEDICAL RECORD NUMBER:  [id number]  DATE:  5/9/2024    Multilayer compression wrap: Removed old Multilayer wrap if indicated and wash leg with mild soap/water.  Applied moisturizing agent to dry skin as needed.   Applied primary and secondary dressing as ordered.  Applied multilayered dressing below the knee to right lower leg.  Instructed patient/caregiver not to remove dressing and to keep it clean and dry.   Instructed patient/caregiver on complications to report to provider, such as pain, numbness in toes, heavy drainage, and slippage of dressing.  Instructed patient on purpose of compression dressing and on activity and exercise recommendations.      Electronically signed by Dulce Dean RN on 5/9/2024 at 3:56 PM   
10/05/16 Venous ulcer WOUND #1 RT PROXIMAL LEG(ONSET 2 MTHS) (Active)   Number of days: 2773       Wound 10/05/16 Venous ulcer WOUND #2 RT LATERAL LEG CLUSTER (ONSET 2MTHS) (Active)   Number of days: 2773       Wound 10/05/16 Venous ulcer WOUND #3RT DISTAL LEG(ONSET 2 MTHS) (Active)   Number of days: 2773       Wound 10/05/16 Venous ulcer WOUND 4 RT MEDIAL LEG (ONSET 2 MTHS) (Active)   Number of days: 2773       Wound 05/09/24 #1 Right Posterior Lower Leg (Active)   Wound Image   05/09/24 1447   Wound Cleansed Wound cleanser;Soap and water 05/09/24 1447   Offloading for Diabetic Foot Ulcers Other (comment) 05/09/24 1447   Wound Length (cm) 2 cm 05/09/24 1447   Wound Width (cm) 1.5 cm 05/09/24 1447   Wound Depth (cm) 0.1 cm 05/09/24 1447   Wound Surface Area (cm^2) 3 cm^2 05/09/24 1447   Wound Volume (cm^3) 0.3 cm^3 05/09/24 1447   Post-Procedure Length (cm) 2 cm 05/09/24 1513   Post-Procedure Width (cm) 1.5 cm 05/09/24 1513   Post-Procedure Depth (cm) 0.1 cm 05/09/24 1513   Post-Procedure Surface Area (cm^2) 3 cm^2 05/09/24 1513   Post-Procedure Volume (cm^3) 0.3 cm^3 05/09/24 1513   Distance Tunneling (cm) 0 cm 05/09/24 1447   Tunneling Position ___ O'Clock 0 05/09/24 1447   Undermining Starts ___ O'Clock 0 05/09/24 1447   Undermining Ends___ O'Clock 0 05/09/24 1447   Undermining Maxium Distance (cm) 0 05/09/24 1447   Drainage Amount Moderate (25-50%) 05/09/24 1447   Drainage Description Yellow;Serosanguinous 05/09/24 1447   Odor None 05/09/24 1447   Margins Defined edges 05/09/24 1447   Wound Thickness Description not for Pressure Injury Full thickness 05/09/24 1447   Number of days: 0       Wound Leg Right;Lateral #2 (Active)   Wound Image   05/09/24 1447   Wound Cleansed Wound cleanser;Soap and water 05/09/24 1447   Offloading for Diabetic Foot Ulcers Other (comment) 05/09/24 1447   Wound Length (cm) 3.9 cm 05/09/24 1447   Wound Width (cm) 3.2 cm 05/09/24 1447   Wound Depth (cm) 0.1 cm 05/09/24 1447   Wound

## 2024-05-09 NOTE — PATIENT INSTRUCTIONS
PHYSICIAN ORDERS AND DISCHARGE INSTRUCTIONS    Wound cleansing:     Do not scrub or use excessive force.   Wash hands with soap and water before and after dressing changes.   Prior to applying a clean dressing, cleanse wound with normal saline,               wound cleanser, or mild soap and water.    Ask the physician or nurse before getting the wound(s) wet in a shower      Wound Care Notes:           Orders for this week:  2024       Right Lower Leg :Wash with soap and water. Pat dry.   Desitin to periwound   Apply silvadene, clobetasol, and sorbact contact layer to wound bed   Cover with large agile   Wrap with coflex lites   Change: Leave in place until next visit to wound care center     Dispense 30 day quantity when ordering supplies.  Plan:       Nurse Visit:   Follow Up Instructions: At the Wound Care Center in 1 week:   Primary Wound Care Provider: Bucky Melissa CNP   Call  for any questions or concerns.  Central [phone] for imaging and lab work

## 2024-05-17 ENCOUNTER — HOSPITAL ENCOUNTER (OUTPATIENT)
Dept: WOUND CARE | Age: 84
Discharge: HOME | End: 2024-05-17
Attending: NURSE PRACTITIONER
Payer: MEDICARE

## 2024-05-17 VITALS
SYSTOLIC BLOOD PRESSURE: 134 MMHG | TEMPERATURE: 98 F | DIASTOLIC BLOOD PRESSURE: 61 MMHG | HEART RATE: 72 BPM | RESPIRATION RATE: 20 BRPM

## 2024-05-17 DIAGNOSIS — L97.212 VENOUS STASIS ULCER OF RIGHT CALF WITH FAT LAYER EXPOSED, UNSPECIFIED WHETHER VARICOSE VEINS PRESENT (HCC): ICD-10-CM

## 2024-05-17 DIAGNOSIS — I87.313 CHRONIC VENOUS HYPERTENSION (IDIOPATHIC) WITH ULCER OF BILATERAL LOWER EXTREMITY (CODE) (HCC): Primary | ICD-10-CM

## 2024-05-17 DIAGNOSIS — I83.012 VENOUS STASIS ULCER OF RIGHT CALF WITH FAT LAYER EXPOSED, UNSPECIFIED WHETHER VARICOSE VEINS PRESENT (HCC): ICD-10-CM

## 2024-05-17 PROCEDURE — 11042 DBRDMT SUBQ TIS 1ST 20SQCM/<: CPT

## 2024-05-17 PROCEDURE — 6370000000 HC RX 637 (ALT 250 FOR IP): Performed by: NURSE PRACTITIONER

## 2024-05-17 RX ORDER — LIDOCAINE HYDROCHLORIDE 20 MG/ML
JELLY TOPICAL ONCE
OUTPATIENT
Start: 2024-05-17 | End: 2024-05-17

## 2024-05-17 RX ORDER — LIDOCAINE 50 MG/G
OINTMENT TOPICAL ONCE
OUTPATIENT
Start: 2024-05-17 | End: 2024-05-17

## 2024-05-17 RX ORDER — CLOBETASOL PROPIONATE 0.5 MG/G
OINTMENT TOPICAL ONCE
Status: COMPLETED | OUTPATIENT
Start: 2024-05-17 | End: 2024-05-17

## 2024-05-17 RX ORDER — BACITRACIN ZINC 500 [USP'U]/G
OINTMENT TOPICAL ONCE
OUTPATIENT
Start: 2024-05-17 | End: 2024-05-17

## 2024-05-17 RX ORDER — GENTAMICIN SULFATE 1 MG/G
OINTMENT TOPICAL ONCE
OUTPATIENT
Start: 2024-05-17 | End: 2024-05-17

## 2024-05-17 RX ORDER — BETAMETHASONE DIPROPIONATE 0.5 MG/G
CREAM TOPICAL ONCE
OUTPATIENT
Start: 2024-05-17 | End: 2024-05-17

## 2024-05-17 RX ORDER — IBUPROFEN 200 MG
TABLET ORAL ONCE
OUTPATIENT
Start: 2024-05-17 | End: 2024-05-17

## 2024-05-17 RX ORDER — LIDOCAINE HYDROCHLORIDE 40 MG/ML
SOLUTION TOPICAL ONCE
OUTPATIENT
Start: 2024-05-17 | End: 2024-05-17

## 2024-05-17 RX ORDER — BACITRACIN ZINC AND POLYMYXIN B SULFATE 500; 1000 [USP'U]/G; [USP'U]/G
OINTMENT TOPICAL ONCE
OUTPATIENT
Start: 2024-05-17 | End: 2024-05-17

## 2024-05-17 RX ORDER — TRIAMCINOLONE ACETONIDE 1 MG/G
OINTMENT TOPICAL ONCE
OUTPATIENT
Start: 2024-05-17 | End: 2024-05-17

## 2024-05-17 RX ORDER — LIDOCAINE 40 MG/G
CREAM TOPICAL ONCE
OUTPATIENT
Start: 2024-05-17 | End: 2024-05-17

## 2024-05-17 RX ORDER — SODIUM CHLOR/HYPOCHLOROUS ACID 0.033 %
SOLUTION, IRRIGATION IRRIGATION ONCE
OUTPATIENT
Start: 2024-05-17 | End: 2024-05-17

## 2024-05-17 RX ORDER — CLOBETASOL PROPIONATE 0.5 MG/G
OINTMENT TOPICAL ONCE
OUTPATIENT
Start: 2024-05-17 | End: 2024-05-17

## 2024-05-17 RX ADMIN — CLOBETASOL PROPIONATE: 0.5 OINTMENT TOPICAL at 15:07

## 2024-05-17 NOTE — PROGRESS NOTES
Wound Care Center Progress Note With Procedure    Angela Vega  AGE: 84 y.o.   GENDER: male  : 1940  EPISODE DATE:  2024     Subjective:     Chief Complaint   Patient presents with    Wound Check     Rt leg         HISTORY of PRESENT ILLNESS     Angela Vega is a 84 y.o. male who presents to the Wound Clinic for a visit for evaluation and treatment of Acute venous  ulcer(s) of  R lower leg lateral.  The condition is of moderate severity. The ulcer has been present for 2 weeks.  The underlying cause is thought to be venous stasis.  The patients care to date has included nothing so far. The patient has significant underlying medical conditions as below.     2024: Patient looks much better this week. Taking antibiotics without issue and tolerated dressing  Great looking wound bed and will increase compression today. RN visit next week and provider to see the week after       Patient seen previously and was given compressions, which he wears occasionally.  Likely needs antibiotics and compression     Very touchy and not allowing a heavy debridement     Not a diabetic or a smoker      Wound Pain Timing/Severity: waxing and waning  Quality of pain: throbbing, shooting, tender  Severity of pain:  5 / 10   Modifying Factors: edema, venous stasis, decreased mobility, and obesity  Associated Signs/Symptoms: edema, erythema, drainage, and pain        PAST MEDICAL HISTORY        Diagnosis Date    Aortic regurgitation 2012    4/3/2012-mild; 2011-mild to moderate    Arrhythmia 2011-tachycardia episode during cardiac cath-48 HR Holter monitor normal    Blisters of multiple sites 10/05/2016    CAD (coronary artery disease)     sees Dr Molina, prior Dr Coker;    Cardiac pacemaker 2001-[employer] PPM Integrity DDD Model 5342    Congestive heart failure (HCC)     Family history of coronary artery disease     Father-MI    GERD (gastroesophageal reflux disease)     H/O

## 2024-05-17 NOTE — PATIENT INSTRUCTIONS
PHYSICIAN ORDERS AND DISCHARGE INSTRUCTIONS    Wound cleansing:     Do not scrub or use excessive force.   Wash hands with soap and water before and after dressing changes.   Prior to applying a clean dressing, cleanse wound with normal saline,               wound cleanser, or mild soap and water.    Ask the physician or nurse before getting the wound(s) wet in a shower      Wound Care Notes:           Orders for this week:  2024       Right Lower Leg :Wash with soap and water. Pat dry.   Desitin to periwound   Apply silvadene, clobetasol, and sorbact contact layer to wound bed   Cover with agile   Wrap with coflex full  Change: Leave in place until next visit to wound care center     Dispense 30 day quantity when ordering supplies.  Plan:      Nurse Visit 1 week   Follow Up Instructions: At the Wound Care Center in 2 week: Friday   Primary Wound Care Provider: Bucky Melissa CNP   Call  for any questions or concerns.  Central [phone] for imaging and lab work

## 2024-05-17 NOTE — PROGRESS NOTES
Multilayer Compression Wrap   (Not Unna) Below the Knee    NAME:  Angela Vega  YOB: 1940  MEDICAL RECORD NUMBER:  [id number]  DATE:  5/17/2024    Multilayer compression wrap: Removed old Multilayer wrap if indicated and wash leg with mild soap/water.  Applied moisturizing agent to dry skin as needed.   Applied primary and secondary dressing as ordered.  Applied multilayered dressing below the knee to right lower leg.  Instructed patient/caregiver not to remove dressing and to keep it clean and dry.   Instructed patient/caregiver on complications to report to provider, such as pain, numbness in toes, heavy drainage, and slippage of dressing.  Instructed patient on purpose of compression dressing and on activity and exercise recommendations.      Electronically signed by TIAN TAYLOR LPN on 5/17/2024 at 3:06 PM

## 2024-05-24 ENCOUNTER — HOSPITAL ENCOUNTER (OUTPATIENT)
Dept: WOUND CARE | Age: 84
Discharge: HOME | End: 2024-05-24
Attending: NURSE PRACTITIONER
Payer: MEDICARE

## 2024-05-24 VITALS
HEART RATE: 74 BPM | RESPIRATION RATE: 20 BRPM | SYSTOLIC BLOOD PRESSURE: 126 MMHG | TEMPERATURE: 98 F | DIASTOLIC BLOOD PRESSURE: 59 MMHG

## 2024-05-24 PROCEDURE — 29581 APPL MULTLAYER CMPRN SYS LEG: CPT

## 2024-05-24 NOTE — PROGRESS NOTES
Multilayer Compression Wrap   (Not Unna) Below the Knee    NAME:  Angela Vega  YOB: 1940  MEDICAL RECORD NUMBER:  [id number]  DATE:  5/24/2024    Multilayer compression wrap: Removed old Multilayer wrap if indicated and wash leg with mild soap/water.  Applied moisturizing agent to dry skin as needed.   Applied primary and secondary dressing as ordered.  Applied multilayered dressing below the knee to right lower leg.  Instructed patient/caregiver not to remove dressing and to keep it clean and dry.   Instructed patient/caregiver on complications to report to provider, such as pain, numbness in toes, heavy drainage, and slippage of dressing.  Instructed patient on purpose of compression dressing and on activity and exercise recommendations.      Electronically signed by Aric Pena RN on 5/24/2024 at 2:03 PM

## 2024-05-29 ENCOUNTER — TELEPHONE (OUTPATIENT)
Dept: CARDIOLOGY CLINIC | Age: 84
End: 2024-05-29

## 2024-05-30 ENCOUNTER — PROCEDURE VISIT (OUTPATIENT)
Dept: CARDIOLOGY CLINIC | Age: 84
End: 2024-05-30

## 2024-05-30 ENCOUNTER — TELEPHONE (OUTPATIENT)
Dept: CARDIOLOGY CLINIC | Age: 84
End: 2024-05-30

## 2024-05-30 DIAGNOSIS — Z95.0 CARDIAC PACEMAKER IN SITU: Primary | ICD-10-CM

## 2024-05-31 ENCOUNTER — HOSPITAL ENCOUNTER (OUTPATIENT)
Dept: WOUND CARE | Age: 84
Discharge: HOME | End: 2024-05-31
Attending: NURSE PRACTITIONER
Payer: MEDICARE

## 2024-05-31 VITALS
TEMPERATURE: 98.2 F | DIASTOLIC BLOOD PRESSURE: 61 MMHG | RESPIRATION RATE: 20 BRPM | HEART RATE: 65 BPM | SYSTOLIC BLOOD PRESSURE: 140 MMHG

## 2024-05-31 DIAGNOSIS — I87.313 CHRONIC VENOUS HYPERTENSION (IDIOPATHIC) WITH ULCER OF BILATERAL LOWER EXTREMITY (CODE) (HCC): Primary | ICD-10-CM

## 2024-05-31 DIAGNOSIS — I83.012 VENOUS STASIS ULCER OF RIGHT CALF WITH FAT LAYER EXPOSED, UNSPECIFIED WHETHER VARICOSE VEINS PRESENT (HCC): ICD-10-CM

## 2024-05-31 DIAGNOSIS — L97.212 VENOUS STASIS ULCER OF RIGHT CALF WITH FAT LAYER EXPOSED, UNSPECIFIED WHETHER VARICOSE VEINS PRESENT (HCC): ICD-10-CM

## 2024-05-31 PROCEDURE — 11042 DBRDMT SUBQ TIS 1ST 20SQCM/<: CPT

## 2024-05-31 PROCEDURE — 11042 DBRDMT SUBQ TIS 1ST 20SQCM/<: CPT | Performed by: NURSE PRACTITIONER

## 2024-05-31 RX ORDER — LIDOCAINE 40 MG/G
CREAM TOPICAL ONCE
OUTPATIENT
Start: 2024-05-31 | End: 2024-05-31

## 2024-05-31 RX ORDER — SODIUM CHLOR/HYPOCHLOROUS ACID 0.033 %
SOLUTION, IRRIGATION IRRIGATION ONCE
OUTPATIENT
Start: 2024-05-31 | End: 2024-05-31

## 2024-05-31 RX ORDER — GENTAMICIN SULFATE 1 MG/G
OINTMENT TOPICAL ONCE
OUTPATIENT
Start: 2024-05-31 | End: 2024-05-31

## 2024-05-31 RX ORDER — LIDOCAINE HYDROCHLORIDE 20 MG/ML
JELLY TOPICAL ONCE
OUTPATIENT
Start: 2024-05-31 | End: 2024-05-31

## 2024-05-31 RX ORDER — BETAMETHASONE DIPROPIONATE 0.5 MG/G
CREAM TOPICAL ONCE
OUTPATIENT
Start: 2024-05-31 | End: 2024-05-31

## 2024-05-31 RX ORDER — LIDOCAINE HYDROCHLORIDE 40 MG/ML
SOLUTION TOPICAL ONCE
OUTPATIENT
Start: 2024-05-31 | End: 2024-05-31

## 2024-05-31 RX ORDER — CLOBETASOL PROPIONATE 0.5 MG/G
OINTMENT TOPICAL ONCE
OUTPATIENT
Start: 2024-05-31 | End: 2024-05-31

## 2024-05-31 RX ORDER — TRIAMCINOLONE ACETONIDE 1 MG/G
OINTMENT TOPICAL ONCE
OUTPATIENT
Start: 2024-05-31 | End: 2024-05-31

## 2024-05-31 RX ORDER — BACITRACIN ZINC 500 [USP'U]/G
OINTMENT TOPICAL ONCE
OUTPATIENT
Start: 2024-05-31 | End: 2024-05-31

## 2024-05-31 RX ORDER — LIDOCAINE 50 MG/G
OINTMENT TOPICAL ONCE
OUTPATIENT
Start: 2024-05-31 | End: 2024-05-31

## 2024-05-31 RX ORDER — IBUPROFEN 200 MG
TABLET ORAL ONCE
OUTPATIENT
Start: 2024-05-31 | End: 2024-05-31

## 2024-05-31 RX ORDER — BACITRACIN ZINC AND POLYMYXIN B SULFATE 500; 1000 [USP'U]/G; [USP'U]/G
OINTMENT TOPICAL ONCE
OUTPATIENT
Start: 2024-05-31 | End: 2024-05-31

## 2024-05-31 ASSESSMENT — PAIN SCALES - GENERAL: PAINLEVEL_OUTOF10: 0

## 2024-05-31 NOTE — PROGRESS NOTES
Wound Care Center Progress Note With Procedure    Angela Vega  AGE: 84 y.o.   GENDER: male  : 1940  EPISODE DATE:  2024     Subjective:     Chief Complaint   Patient presents with    Wound Check     leg         HISTORY of PRESENT ILLNESS     Angela Vega is a 84 y.o. male who presents to the Wound Clinic for a visit for evaluation and treatment of Acute venous  ulcer(s) of  R lower leg lateral.  The condition is of moderate severity. The ulcer has been present for 2 weeks.  The underlying cause is thought to be venous stasis.  The patients care to date has included nothing so far. The patient has significant underlying medical conditions as below.     2024: Patient looking better. Will change up orders and add collagen this week. Tolerated increased compression     2024: Patient looks much better this week. Taking antibiotics without issue and tolerated dressing  Great looking wound bed and will increase compression today. RN visit next week and provider to see the week after        Patient seen previously and was given compressions, which he wears occasionally.  Likely needs antibiotics and compression     Very touchy and not allowing a heavy debridement     Not a diabetic or a smoker      Wound Pain Timing/Severity: waxing and waning  Quality of pain: throbbing, shooting, tender  Severity of pain:  5 / 10   Modifying Factors: edema, venous stasis, decreased mobility, and obesity  Associated Signs/Symptoms: edema, erythema, drainage, and pain        PAST MEDICAL HISTORY        Diagnosis Date    Aortic regurgitation 2012    4/3/2012-mild; 2011-mild to moderate    Arrhythmia 2011-tachycardia episode during cardiac cath-48 HR Holter monitor normal    Blisters of multiple sites 10/05/2016    CAD (coronary artery disease)     sees Dr Molina, prior Dr Coker;    Cardiac pacemaker 2001-[employer] PPM Integrity DDD Model 5342    Congestive heart

## 2024-05-31 NOTE — PROGRESS NOTES
.Multilayer Compression Wrap   (Not Unna) Below the Knee    NAME:  Angela Vega  YOB: 1940  MEDICAL RECORD NUMBER:  [id number]  DATE:  5/31/2024    Multilayer compression wrap: Removed old Multilayer wrap if indicated and wash leg with mild soap/water.  Applied moisturizing agent to dry skin as needed.   Applied primary and secondary dressing as ordered.  Applied multilayered dressing below the knee to right lower leg.  Instructed patient/caregiver not to remove dressing and to keep it clean and dry.   Instructed patient/caregiver on complications to report to provider, such as pain, numbness in toes, heavy drainage, and slippage of dressing.  Instructed patient on purpose of compression dressing and on activity and exercise recommendations.      Electronically signed by Dulce Dean RN on 5/31/2024 at 2:35 PM

## 2024-05-31 NOTE — PATIENT INSTRUCTIONS
PHYSICIAN ORDERS AND DISCHARGE INSTRUCTIONS    Wound cleansing:     Do not scrub or use excessive force.   Wash hands with soap and water before and after dressing changes.   Prior to applying a clean dressing, cleanse wound with normal saline,               wound cleanser, or mild soap and water.    Ask the physician or nurse before getting the wound(s) wet in a shower      Wound Care Notes:           Orders for this week:  2024       Right Lower Leg - Wash with soap and water, pat dry.   Desitin to periwound   Apply Anasept, Stimulen, and sorbact contact layer to wound bed   Cover with Agile.  Wrap with coflex full  Leave in place until next visit to wound care center     Dispense 30 day quantity when ordering supplies.  Plan:      Follow Up Instructions: At the Wound Care Center in 1 week: Friday   Primary Wound Care Provider: Bucky Melissa CNP   Call  for any questions or concerns.  Central [phone] for imaging and lab work

## 2024-06-07 ENCOUNTER — HOSPITAL ENCOUNTER (OUTPATIENT)
Dept: WOUND CARE | Age: 84
Discharge: HOME | End: 2024-06-07
Attending: NURSE PRACTITIONER
Payer: MEDICARE

## 2024-06-07 VITALS
HEART RATE: 67 BPM | TEMPERATURE: 97.8 F | DIASTOLIC BLOOD PRESSURE: 69 MMHG | SYSTOLIC BLOOD PRESSURE: 140 MMHG | RESPIRATION RATE: 20 BRPM

## 2024-06-07 DIAGNOSIS — I83.012 VENOUS STASIS ULCER OF RIGHT CALF WITH FAT LAYER EXPOSED, UNSPECIFIED WHETHER VARICOSE VEINS PRESENT (HCC): ICD-10-CM

## 2024-06-07 DIAGNOSIS — I87.313 CHRONIC VENOUS HYPERTENSION (IDIOPATHIC) WITH ULCER OF BILATERAL LOWER EXTREMITY (CODE) (HCC): Primary | ICD-10-CM

## 2024-06-07 DIAGNOSIS — L97.212 VENOUS STASIS ULCER OF RIGHT CALF WITH FAT LAYER EXPOSED, UNSPECIFIED WHETHER VARICOSE VEINS PRESENT (HCC): ICD-10-CM

## 2024-06-07 PROCEDURE — 11042 DBRDMT SUBQ TIS 1ST 20SQCM/<: CPT | Performed by: NURSE PRACTITIONER

## 2024-06-07 RX ORDER — LIDOCAINE HYDROCHLORIDE 20 MG/ML
JELLY TOPICAL ONCE
OUTPATIENT
Start: 2024-06-07 | End: 2024-06-07

## 2024-06-07 RX ORDER — TRIAMCINOLONE ACETONIDE 1 MG/G
OINTMENT TOPICAL ONCE
OUTPATIENT
Start: 2024-06-07 | End: 2024-06-07

## 2024-06-07 RX ORDER — GENTAMICIN SULFATE 1 MG/G
OINTMENT TOPICAL ONCE
OUTPATIENT
Start: 2024-06-07 | End: 2024-06-07

## 2024-06-07 RX ORDER — SODIUM CHLOR/HYPOCHLOROUS ACID 0.033 %
SOLUTION, IRRIGATION IRRIGATION ONCE
OUTPATIENT
Start: 2024-06-07 | End: 2024-06-07

## 2024-06-07 RX ORDER — BACITRACIN ZINC 500 [USP'U]/G
OINTMENT TOPICAL ONCE
OUTPATIENT
Start: 2024-06-07 | End: 2024-06-07

## 2024-06-07 RX ORDER — LIDOCAINE 50 MG/G
OINTMENT TOPICAL ONCE
OUTPATIENT
Start: 2024-06-07 | End: 2024-06-07

## 2024-06-07 RX ORDER — CLOBETASOL PROPIONATE 0.5 MG/G
OINTMENT TOPICAL ONCE
OUTPATIENT
Start: 2024-06-07 | End: 2024-06-07

## 2024-06-07 RX ORDER — LIDOCAINE HYDROCHLORIDE 40 MG/ML
SOLUTION TOPICAL ONCE
OUTPATIENT
Start: 2024-06-07 | End: 2024-06-07

## 2024-06-07 RX ORDER — BETAMETHASONE DIPROPIONATE 0.5 MG/G
CREAM TOPICAL ONCE
OUTPATIENT
Start: 2024-06-07 | End: 2024-06-07

## 2024-06-07 RX ORDER — LIDOCAINE 40 MG/G
CREAM TOPICAL ONCE
OUTPATIENT
Start: 2024-06-07 | End: 2024-06-07

## 2024-06-07 RX ORDER — BACITRACIN ZINC AND POLYMYXIN B SULFATE 500; 1000 [USP'U]/G; [USP'U]/G
OINTMENT TOPICAL ONCE
OUTPATIENT
Start: 2024-06-07 | End: 2024-06-07

## 2024-06-07 RX ORDER — IBUPROFEN 200 MG
TABLET ORAL ONCE
OUTPATIENT
Start: 2024-06-07 | End: 2024-06-07

## 2024-06-07 ASSESSMENT — PAIN SCALES - GENERAL: PAINLEVEL_OUTOF10: 0

## 2024-06-07 NOTE — PATIENT INSTRUCTIONS
PHYSICIAN ORDERS AND DISCHARGE INSTRUCTIONS    Wound cleansing:     Do not scrub or use excessive force.   Wash hands with soap and water before and after dressing changes.   Prior to applying a clean dressing, cleanse wound with normal saline,               wound cleanser, or mild soap and water.    Ask the physician or nurse before getting the wound(s) wet in a shower      Wound Care Notes:           Orders for this week:  2024       Right Lower Leg - Wash with soap and water, pat dry.   Desitin to periwound   Apply Anasept, Stimulen, and sorbact contact layer to wound bed   Cover with Agile.  Wrap with coflex full  Leave in place until next visit to wound care center     Dispense 30 day quantity when ordering supplies.  Plan:      Nurse Visit 24  Follow Up Instructions: At the Wound Care Center in 2 week: Friday   Primary Wound Care Provider: Bucky Melissa CNP   Call  for any questions or concerns.  Central [phone] for imaging and lab work   No Residual Tumor Seen Histology Text: There were no malignant cells seen in the sections examined.

## 2024-06-07 NOTE — PROGRESS NOTES
Wound Care Center Progress Note With Procedure    Angela Vega  AGE: 84 y.o.   GENDER: male  : 1940  EPISODE DATE:  2024     Subjective:     Chief Complaint   Patient presents with    Wound Check     Right leg         HISTORY of PRESENT ILLNESS     Angela Vega is a 84 y.o. male who presents to the Wound Clinic for a visit for evaluation and treatment of Acute venous  ulcer(s) of  R lower leg lateral.  The condition is of moderate severity. The ulcer has been present for 2 weeks.  The underlying cause is thought to be venous stasis.  The patients care to date has included nothing so far. The patient has significant underlying medical conditions as below.     2024: Patient looking good. Will RN visit next week. Improved in size this week     2024: Patient looking better. Will change up orders and add collagen this week. Tolerated increased compression     2024: Patient looks much better this week. Taking antibiotics without issue and tolerated dressing  Great looking wound bed and will increase compression today. RN visit next week and provider to see the week after        Patient seen previously and was given compressions, which he wears occasionally.  Likely needs antibiotics and compression     Very touchy and not allowing a heavy debridement     Not a diabetic or a smoker      Wound Pain Timing/Severity: waxing and waning  Quality of pain: throbbing, shooting, tender  Severity of pain:  5 / 10   Modifying Factors: edema, venous stasis, decreased mobility, and obesity  Associated Signs/Symptoms: edema, erythema, drainage, and pain        PAST MEDICAL HISTORY        Diagnosis Date    Aortic regurgitation 2012    4/3/2012-mild; 2011-mild to moderate    Arrhythmia 2011-tachycardia episode during cardiac cath-48 HR Holter monitor normal    Blisters of multiple sites 10/05/2016    CAD (coronary artery disease)     sees Dr Molina, prior Dr Coker;

## 2024-06-07 NOTE — PROGRESS NOTES
Multilayer Compression Wrap   (Not Unna) Below the Knee    NAME:  Angela Vega  YOB: 1940  MEDICAL RECORD NUMBER:  [id number]  DATE:  6/7/2024    Multilayer compression wrap: Removed old Multilayer wrap if indicated and wash leg with mild soap/water.  Applied moisturizing agent to dry skin as needed.   Applied primary and secondary dressing as ordered.  Applied multilayered dressing below the knee to right lower leg.  Instructed patient/caregiver not to remove dressing and to keep it clean and dry.   Instructed patient/caregiver on complications to report to provider, such as pain, numbness in toes, heavy drainage, and slippage of dressing.  Instructed patient on purpose of compression dressing and on activity and exercise recommendations.      Electronically signed by Venkat Kim RN on 6/7/2024 at 2:33 PM

## 2024-06-14 ENCOUNTER — HOSPITAL ENCOUNTER (OUTPATIENT)
Dept: WOUND CARE | Age: 84
Discharge: HOME | End: 2024-06-14
Attending: NURSE PRACTITIONER
Payer: MEDICARE

## 2024-06-14 VITALS
TEMPERATURE: 98.6 F | SYSTOLIC BLOOD PRESSURE: 148 MMHG | HEART RATE: 66 BPM | DIASTOLIC BLOOD PRESSURE: 66 MMHG | RESPIRATION RATE: 20 BRPM

## 2024-06-14 PROCEDURE — 29581 APPL MULTLAYER CMPRN SYS LEG: CPT

## 2024-06-14 NOTE — PROGRESS NOTES
.Multilayer Compression Wrap   (Not Unna) Below the Knee    NAME:  Angela Vega  YOB: 1940  MEDICAL RECORD NUMBER:  [id number]  DATE:  6/14/2024    Multilayer compression wrap: Removed old Multilayer wrap if indicated and wash leg with mild soap/water.  Applied moisturizing agent to dry skin as needed.   Applied primary and secondary dressing as ordered.  Applied multilayered dressing below the knee to right lower leg.  Instructed patient/caregiver not to remove dressing and to keep it clean and dry.   Instructed patient/caregiver on complications to report to provider, such as pain, numbness in toes, heavy drainage, and slippage of dressing.  Instructed patient on purpose of compression dressing and on activity and exercise recommendations.      Electronically signed by Dulce Dean RN on 6/14/2024 at 10:36 AM

## 2024-06-21 ENCOUNTER — HOSPITAL ENCOUNTER (OUTPATIENT)
Dept: WOUND CARE | Age: 84
Discharge: HOME | End: 2024-06-21
Attending: NURSE PRACTITIONER
Payer: MEDICARE

## 2024-06-21 VITALS
HEART RATE: 67 BPM | RESPIRATION RATE: 18 BRPM | TEMPERATURE: 98.6 F | DIASTOLIC BLOOD PRESSURE: 66 MMHG | SYSTOLIC BLOOD PRESSURE: 140 MMHG

## 2024-06-21 DIAGNOSIS — L97.812 NON-PRESSURE CHRONIC ULCER OF OTHER PART OF RIGHT LOWER LEG WITH FAT LAYER EXPOSED (HCC): ICD-10-CM

## 2024-06-21 DIAGNOSIS — I87.313 CHRONIC VENOUS HYPERTENSION (IDIOPATHIC) WITH ULCER OF BILATERAL LOWER EXTREMITY (CODE) (HCC): Primary | ICD-10-CM

## 2024-06-21 DIAGNOSIS — I83.012 VENOUS STASIS ULCER OF RIGHT CALF WITH FAT LAYER EXPOSED, UNSPECIFIED WHETHER VARICOSE VEINS PRESENT (HCC): ICD-10-CM

## 2024-06-21 DIAGNOSIS — L97.212 VENOUS STASIS ULCER OF RIGHT CALF WITH FAT LAYER EXPOSED, UNSPECIFIED WHETHER VARICOSE VEINS PRESENT (HCC): ICD-10-CM

## 2024-06-21 DIAGNOSIS — L97.821 NON-PRESSURE CHRONIC ULCER OF OTHER PART OF LEFT LOWER LEG LIMITED TO BREAKDOWN OF SKIN (HCC): ICD-10-CM

## 2024-06-21 PROCEDURE — 17250 CHEM CAUT OF GRANLTJ TISSUE: CPT

## 2024-06-21 PROCEDURE — 11042 DBRDMT SUBQ TIS 1ST 20SQCM/<: CPT

## 2024-06-21 RX ORDER — LIDOCAINE 40 MG/G
CREAM TOPICAL ONCE
OUTPATIENT
Start: 2024-06-21 | End: 2024-06-21

## 2024-06-21 RX ORDER — LIDOCAINE HYDROCHLORIDE 20 MG/ML
JELLY TOPICAL ONCE
OUTPATIENT
Start: 2024-06-21 | End: 2024-06-21

## 2024-06-21 RX ORDER — IBUPROFEN 200 MG
TABLET ORAL ONCE
OUTPATIENT
Start: 2024-06-21 | End: 2024-06-21

## 2024-06-21 RX ORDER — TRIAMCINOLONE ACETONIDE 1 MG/G
OINTMENT TOPICAL ONCE
OUTPATIENT
Start: 2024-06-21 | End: 2024-06-21

## 2024-06-21 RX ORDER — LIDOCAINE 50 MG/G
OINTMENT TOPICAL ONCE
OUTPATIENT
Start: 2024-06-21 | End: 2024-06-21

## 2024-06-21 RX ORDER — CLOBETASOL PROPIONATE 0.5 MG/G
OINTMENT TOPICAL ONCE
OUTPATIENT
Start: 2024-06-21 | End: 2024-06-21

## 2024-06-21 RX ORDER — SODIUM CHLOR/HYPOCHLOROUS ACID 0.033 %
SOLUTION, IRRIGATION IRRIGATION ONCE
OUTPATIENT
Start: 2024-06-21 | End: 2024-06-21

## 2024-06-21 RX ORDER — LIDOCAINE HYDROCHLORIDE 40 MG/ML
SOLUTION TOPICAL ONCE
OUTPATIENT
Start: 2024-06-21 | End: 2024-06-21

## 2024-06-21 RX ORDER — BACITRACIN ZINC 500 [USP'U]/G
OINTMENT TOPICAL ONCE
OUTPATIENT
Start: 2024-06-21 | End: 2024-06-21

## 2024-06-21 RX ORDER — GENTAMICIN SULFATE 1 MG/G
OINTMENT TOPICAL ONCE
OUTPATIENT
Start: 2024-06-21 | End: 2024-06-21

## 2024-06-21 RX ORDER — BETAMETHASONE DIPROPIONATE 0.5 MG/G
CREAM TOPICAL ONCE
OUTPATIENT
Start: 2024-06-21 | End: 2024-06-21

## 2024-06-21 RX ORDER — BACITRACIN ZINC AND POLYMYXIN B SULFATE 500; 1000 [USP'U]/G; [USP'U]/G
OINTMENT TOPICAL ONCE
OUTPATIENT
Start: 2024-06-21 | End: 2024-06-21

## 2024-06-21 NOTE — PATIENT INSTRUCTIONS
PHYSICIAN ORDERS AND DISCHARGE INSTRUCTIONS    Wound cleansing:     Do not scrub or use excessive force.   Wash hands with soap and water before and after dressing changes.   Prior to applying a clean dressing, cleanse wound with normal saline,               wound cleanser, or mild soap and water.    Ask the physician or nurse before getting the wound(s) wet in a shower      Wound Care Notes:           Orders for this week:  2024       Right Lower Leg - Wash with soap and water, pat dry.   Desitin to periwound   Apply Anasept, Stimulen, and sorbact contact layer to wound bed   Cover with Agile.  Wrap with coflex full  Leave in place until next visit to wound care center     Dispense 30 day quantity when ordering supplies.  Plan:      Follow Up Instructions: At the Wound Care Center in 1 week: Friday   Primary Wound Care Provider: Bucky Melissa CNP   Call  for any questions or concerns.  Central [phone] for imaging and lab work

## 2024-06-21 NOTE — PROGRESS NOTES
Wound Care Center Progress Note and Chemical Cautery      Angela Vega  AGE: 84 y.o.   GENDER: male  : 1940  EPISODE DATE:  2024     Subjective:     Chief Complaint   Patient presents with    Wound Check     R Leg         HISTORY of PRESENT ILLNESS     Angela Vega is a 84 y.o. male who presents to the Wound Clinic for a visit for evaluation and treatment of Acute venous  ulcer(s) of  R lower leg lateral.  The condition is of moderate severity. The ulcer has been present for 2 weeks.  The underlying cause is thought to be venous stasis.  The patients care to date has included nothing so far. The patient has significant underlying medical conditions as below.     2024: Patient just about healed. Agreeable o 1 more week of treatment and then we can discharge to home     2024: Patient looking good. Will RN visit next week. Improved in size this week     2024: Patient looking better. Will change up orders and add collagen this week. Tolerated increased compression     2024: Patient looks much better this week. Taking antibiotics without issue and tolerated dressing  Great looking wound bed and will increase compression today. RN visit next week and provider to see the week after        Patient seen previously and was given compressions, which he wears occasionally.  Likely needs antibiotics and compression     Very touchy and not allowing a heavy debridement     Not a diabetic or a smoker      Wound Pain Timing/Severity: waxing and waning  Quality of pain: throbbing, shooting, tender  Severity of pain:  5 / 10   Modifying Factors: edema, venous stasis, decreased mobility, and obesity  Associated Signs/Symptoms: edema, erythema, drainage, and pain                                                PAST MEDICAL HISTORY        Diagnosis Date    Aortic regurgitation 2012    4/3/2012-mild; 2011-mild to moderate    Arrhythmia 2011-tachycardia episode during

## 2024-06-21 NOTE — WOUND CARE
Multilayer Compression Wrap   (Not Unna) Below the Knee    NAME:  Angela Vega  YOB: 1940  MEDICAL RECORD NUMBER:  [id number]  DATE:  6/21/2024    Multilayer compression wrap: Removed old Multilayer wrap if indicated and wash leg with mild soap/water.  Applied moisturizing agent to dry skin as needed.   Applied primary and secondary dressing as ordered.  Applied multilayered dressing below the knee to right lower leg.  Instructed patient/caregiver not to remove dressing and to keep it clean and dry.   Instructed patient/caregiver on complications to report to provider, such as pain, numbness in toes, heavy drainage, and slippage of dressing.  Instructed patient on purpose of compression dressing and on activity and exercise recommendations.      Electronically signed by Eboni Al RN on 6/21/2024 at 2:24 PM

## 2024-06-28 ENCOUNTER — HOSPITAL ENCOUNTER (OUTPATIENT)
Dept: WOUND CARE | Age: 84
Discharge: HOME | End: 2024-06-28
Attending: NURSE PRACTITIONER
Payer: MEDICARE

## 2024-06-28 VITALS
RESPIRATION RATE: 20 BRPM | DIASTOLIC BLOOD PRESSURE: 60 MMHG | TEMPERATURE: 98 F | SYSTOLIC BLOOD PRESSURE: 130 MMHG | HEART RATE: 62 BPM

## 2024-06-28 DIAGNOSIS — L97.212 VENOUS STASIS ULCER OF RIGHT CALF WITH FAT LAYER EXPOSED, UNSPECIFIED WHETHER VARICOSE VEINS PRESENT (HCC): Primary | ICD-10-CM

## 2024-06-28 DIAGNOSIS — L97.812 NON-PRESSURE CHRONIC ULCER OF OTHER PART OF RIGHT LOWER LEG WITH FAT LAYER EXPOSED (HCC): ICD-10-CM

## 2024-06-28 DIAGNOSIS — I87.313 CHRONIC VENOUS HYPERTENSION (IDIOPATHIC) WITH ULCER OF BILATERAL LOWER EXTREMITY (CODE) (HCC): ICD-10-CM

## 2024-06-28 DIAGNOSIS — L97.821 NON-PRESSURE CHRONIC ULCER OF OTHER PART OF LEFT LOWER LEG LIMITED TO BREAKDOWN OF SKIN (HCC): ICD-10-CM

## 2024-06-28 DIAGNOSIS — I83.012 VENOUS STASIS ULCER OF RIGHT CALF WITH FAT LAYER EXPOSED, UNSPECIFIED WHETHER VARICOSE VEINS PRESENT (HCC): Primary | ICD-10-CM

## 2024-06-28 PROCEDURE — 97597 DBRDMT OPN WND 1ST 20 CM/<: CPT

## 2024-06-28 PROCEDURE — 97597 DBRDMT OPN WND 1ST 20 CM/<: CPT | Performed by: NURSE PRACTITIONER

## 2024-06-28 RX ORDER — IBUPROFEN 200 MG
TABLET ORAL ONCE
Status: CANCELLED | OUTPATIENT
Start: 2024-06-28 | End: 2024-06-28

## 2024-06-28 RX ORDER — CLOBETASOL PROPIONATE 0.5 MG/G
OINTMENT TOPICAL ONCE
Status: CANCELLED | OUTPATIENT
Start: 2024-06-28 | End: 2024-06-28

## 2024-06-28 RX ORDER — LIDOCAINE 50 MG/G
OINTMENT TOPICAL ONCE
Status: CANCELLED | OUTPATIENT
Start: 2024-06-28 | End: 2024-06-28

## 2024-06-28 RX ORDER — BETAMETHASONE DIPROPIONATE 0.5 MG/G
CREAM TOPICAL ONCE
Status: CANCELLED | OUTPATIENT
Start: 2024-06-28 | End: 2024-06-28

## 2024-06-28 RX ORDER — GENTAMICIN SULFATE 1 MG/G
OINTMENT TOPICAL ONCE
Status: CANCELLED | OUTPATIENT
Start: 2024-06-28 | End: 2024-06-28

## 2024-06-28 RX ORDER — BACITRACIN ZINC AND POLYMYXIN B SULFATE 500; 1000 [USP'U]/G; [USP'U]/G
OINTMENT TOPICAL ONCE
Status: CANCELLED | OUTPATIENT
Start: 2024-06-28 | End: 2024-06-28

## 2024-06-28 RX ORDER — LIDOCAINE HYDROCHLORIDE 20 MG/ML
JELLY TOPICAL ONCE
Status: CANCELLED | OUTPATIENT
Start: 2024-06-28 | End: 2024-06-28

## 2024-06-28 RX ORDER — TRIAMCINOLONE ACETONIDE 1 MG/G
OINTMENT TOPICAL ONCE
Status: CANCELLED | OUTPATIENT
Start: 2024-06-28 | End: 2024-06-28

## 2024-06-28 RX ORDER — SODIUM CHLOR/HYPOCHLOROUS ACID 0.033 %
SOLUTION, IRRIGATION IRRIGATION ONCE
Status: CANCELLED | OUTPATIENT
Start: 2024-06-28 | End: 2024-06-28

## 2024-06-28 RX ORDER — LIDOCAINE HYDROCHLORIDE 40 MG/ML
SOLUTION TOPICAL ONCE
Status: CANCELLED | OUTPATIENT
Start: 2024-06-28 | End: 2024-06-28

## 2024-06-28 RX ORDER — LIDOCAINE 40 MG/G
CREAM TOPICAL ONCE
Status: CANCELLED | OUTPATIENT
Start: 2024-06-28 | End: 2024-06-28

## 2024-06-28 RX ORDER — BACITRACIN ZINC 500 [USP'U]/G
OINTMENT TOPICAL ONCE
Status: CANCELLED | OUTPATIENT
Start: 2024-06-28 | End: 2024-06-28

## 2024-06-28 NOTE — PATIENT INSTRUCTIONS
PHYSICIAN ORDERS AND DISCHARGE INSTRUCTIONS    Wound cleansing:     Do not scrub or use excessive force.   Wash hands with soap and water before and after dressing changes.   Prior to applying a clean dressing, cleanse wound with normal saline,               wound cleanser, or mild soap and water.    Ask the physician or nurse before getting the wound(s) wet in a shower      Wound Care Notes:           Orders for this week:  2024       Right Lower Leg - Gentac and Tubi     Dispense 30 day quantity when ordering supplies.  Plan:      Follow Up Instructions: Discharged  Primary Wound Care Provider: Bucky Melissa CNP   Call  for any questions or concerns.  Central [phone] for imaging and lab work

## 2024-06-28 NOTE — PROGRESS NOTES
moderate    Arrhythmia 02/22/2011 2/22/2011-tachycardia episode during cardiac cath-48 HR Holter monitor normal    Blisters of multiple sites 10/05/2016    CAD (coronary artery disease)     sees Dr Molina, prior Dr Coker;    Cardiac pacemaker 04/2001 4/2001-[employer] PPM Integrity DDD Model 5342    Congestive heart failure (HCC)     Family history of coronary artery disease     Father-MI    GERD (gastroesophageal reflux disease)     H/O cardiac catheterization 5/2/2012, 2/22/2011, 3/4/2010, 2/2001 5/2/2012-[facility]-No evid of signif CAD; Normal peripheral angiogram; 2/22/2011-No signif CAD, stent patent, had tachycardic event; 3/4/2010-Severe stenosis and re in-stent stenosis of LAD    H/O cardiovascular stress test 4/16/2012, 2/7/2011 4/16/2012-Normal Lexiscan study.No evid of ischemia. Attenuation artifact in inferior region of myocardium. EF 58%.     H/O cardiovascular stress test 03/04/2015    normal, no ischemia, EF62%    H/O cardiovascular stress test 12/06/2017    EF60% normal study    H/O Doppler ultrasound 02/07/2011 2/7/2011-CAROTID US-Normal    H/O Doppler ultrasound 05/24/2013    periphal normal    H/O Doppler ultrasound 06/18/2014 6/14 Normal    H/O Doppler ultrasound 11/13/15 09/03/15    11/15 WNL 9/15Arterial and Venous Doppler is normal.     H/O echocardiogram 03/04/2015    EF60% Normal LV and systolic function. Trace MR, Aortic, and TR. No other valvulopathy.     H/O echocardiogram 08/08/2016    EF60% mild AR, mild phtn    H/O echocardiogram 11/01/2017    EF55-60% sclerotic, non stenotic AV, mild AR, phtn    H/O peptic ulcer 1970s    Hiatal hernia     History of cardiac monitoring 06/01/2015    Event - Patient noted to have recurrence of atrial fibrillation with RVR on medical therapy on event monitor    History of Doppler carotid echocardiogram 01/15/2020    No hemodynamically significant stenosis in the internal carotid artery bilaterally, Right internal carotid artery exhibits mild

## 2024-07-08 ENCOUNTER — TELEPHONE (OUTPATIENT)
Dept: PULMONOLOGY | Age: 84
End: 2024-07-08

## 2024-07-08 NOTE — TELEPHONE ENCOUNTER
Pt called in stating they needed a new mask order sent to [facility].

## 2024-09-13 ENCOUNTER — OFFICE VISIT (OUTPATIENT)
Dept: CARDIOLOGY CLINIC | Age: 84
End: 2024-09-13

## 2024-09-13 VITALS
HEIGHT: 65 IN | BODY MASS INDEX: 34.49 KG/M2 | HEART RATE: 81 BPM | OXYGEN SATURATION: 94 % | DIASTOLIC BLOOD PRESSURE: 70 MMHG | SYSTOLIC BLOOD PRESSURE: 142 MMHG | WEIGHT: 207 LBS

## 2024-09-13 DIAGNOSIS — I48.0 PAF (PAROXYSMAL ATRIAL FIBRILLATION) (HCC): Primary | ICD-10-CM

## 2024-09-13 ASSESSMENT — ENCOUNTER SYMPTOMS
SHORTNESS OF BREATH: 1
ORTHOPNEA: 0

## 2024-12-02 ENCOUNTER — OFFICE VISIT (OUTPATIENT)
Dept: PULMONOLOGY | Age: 84
End: 2024-12-02
Payer: MEDICARE

## 2024-12-02 VITALS — HEART RATE: 84 BPM | BODY MASS INDEX: 32.18 KG/M2 | OXYGEN SATURATION: 93 % | HEIGHT: 67 IN | WEIGHT: 205 LBS

## 2024-12-02 DIAGNOSIS — E66.9 OBESITY (BMI 35.0-39.9 WITHOUT COMORBIDITY): ICD-10-CM

## 2024-12-02 DIAGNOSIS — G47.33 OSA TREATED WITH BIPAP: Primary | ICD-10-CM

## 2024-12-02 DIAGNOSIS — G47.10 HYPERSOMNIA: ICD-10-CM

## 2024-12-02 PROCEDURE — G8484 FLU IMMUNIZE NO ADMIN: HCPCS | Performed by: INTERNAL MEDICINE

## 2024-12-02 PROCEDURE — G8428 CUR MEDS NOT DOCUMENT: HCPCS | Performed by: INTERNAL MEDICINE

## 2024-12-02 PROCEDURE — 1036F TOBACCO NON-USER: CPT | Performed by: INTERNAL MEDICINE

## 2024-12-02 PROCEDURE — 99214 OFFICE O/P EST MOD 30 MIN: CPT | Performed by: INTERNAL MEDICINE

## 2024-12-02 PROCEDURE — G8417 CALC BMI ABV UP PARAM F/U: HCPCS | Performed by: INTERNAL MEDICINE

## 2024-12-02 PROCEDURE — 1123F ACP DISCUSS/DSCN MKR DOCD: CPT | Performed by: INTERNAL MEDICINE

## 2024-12-02 ASSESSMENT — ENCOUNTER SYMPTOMS
COUGH: 0
ABDOMINAL DISTENTION: 0
BACK PAIN: 0
ABDOMINAL PAIN: 0
EYE DISCHARGE: 0
SHORTNESS OF BREATH: 0
EYE ITCHING: 0

## 2024-12-02 NOTE — ASSESSMENT & PLAN NOTE
Advised to be compliant with the BIPAP  Loose weight  He lost 13 lbs with large air leak  Advised Mask fitting trial and if this doesn't help, then may need BIPAP retitration study

## 2024-12-02 NOTE — PROGRESS NOTES
Angela Vega  1940  Referring Provider: Brenden Feliciano, [facility] - General     Subjective:     Chief Complaint   Patient presents with    Sleep Apnea     Compliance       HPI  Angela is a 84 y.o. male has come back as a follow up. He has severe JAIMIE on a BIPAP 19/15 mm hg which he is using it most of the nights about 6 to 8 hours.He says that it is helping him.He has 13 lb  loss of weight. He has a FFM. He is not sleepy during the day time. His 2 week download data showed a residual AHI is 9.0 and leak is 114.3  L/min.      Current Outpatient Medications   Medication Sig Dispense Refill    torsemide (DEMADEX) 20 MG tablet Take 1 tablet by mouth in the morning and at bedtime 60 tablet 5    metaxalone (SKELAXIN) 800 MG tablet       montelukast (SINGULAIR) 10 MG tablet       amLODIPine (NORVASC) 5 MG tablet       potassium chloride (KLOR-CON M) 20 MEQ extended release tablet Take 1 tablet by mouth daily      diclofenac-miSOPROStol (ARTHROTEC 75) 75-0.2 MG per tablet Take 1 tablet by mouth 2 times daily      dronedarone hcl (MULTAQ) 400 MG TABS Take 1 tablet by mouth 2 times daily (with meals)      CPAP Machine MISC by Does not apply route nightly      apixaban (ELIQUIS) 5 MG TABS tablet Take 1 tablet by mouth 2 times daily 60 tablet 3    rosuvastatin (CRESTOR) 20 MG tablet Take 1 tablet by mouth daily      pramipexole (MIRAPEX) 1 MG tablet Take 0.5 tablets by mouth 2 times daily Patient takes 1/2 tablet in the am and 1 1/2 tablet at night.      metoprolol (LOPRESSOR) 50 MG tablet Take 1 tablet by mouth 2 times daily      modafinil (PROVIGIL) 100 MG tablet Take 1 tablet by mouth daily.      esomeprazole (NEXIUM) 40 MG capsule Take 1 capsule by mouth 2 times daily      aspirin 81 MG chewable tablet Take 1 tablet by mouth daily       No current facility-administered medications for this visit.       Allergies   Allergen Reactions    Ceclor [Cefaclor] Rash    Celebrex [Celecoxib] Rash    Pcn [Penicillins] Rash    Relafen

## 2024-12-17 ENCOUNTER — TELEPHONE (OUTPATIENT)
Dept: CARDIOLOGY CLINIC | Age: 84
End: 2024-12-17

## 2024-12-18 ENCOUNTER — TELEPHONE (OUTPATIENT)
Dept: CARDIOLOGY CLINIC | Age: 84
End: 2024-12-18

## 2024-12-18 PROCEDURE — 93294 REM INTERROG EVL PM/LDLS PM: CPT | Performed by: INTERNAL MEDICINE

## 2024-12-18 PROCEDURE — 93296 REM INTERROG EVL PM/IDS: CPT | Performed by: INTERNAL MEDICINE

## 2024-12-18 NOTE — TELEPHONE ENCOUNTER
Called and advised patient that he has 3 months left on pacemaker battery and he is now on monthly checks until it is time for battery change. He voiced understanding.

## 2025-01-15 ENCOUNTER — TELEPHONE (OUTPATIENT)
Dept: CARDIOLOGY CLINIC | Age: 85
End: 2025-01-15

## 2025-01-15 NOTE — TELEPHONE ENCOUNTER
Called and advised patient that he has 4 months on pacemaker battery and to send his next transmission on 2/13/25. He voiced understanding.

## 2025-02-10 ENCOUNTER — OFFICE VISIT (OUTPATIENT)
Dept: PULMONOLOGY | Age: 85
End: 2025-02-10
Payer: MEDICARE

## 2025-02-10 VITALS
BODY MASS INDEX: 31.36 KG/M2 | WEIGHT: 199.8 LBS | HEART RATE: 88 BPM | DIASTOLIC BLOOD PRESSURE: 58 MMHG | OXYGEN SATURATION: 91 % | SYSTOLIC BLOOD PRESSURE: 122 MMHG | HEIGHT: 67 IN

## 2025-02-10 DIAGNOSIS — G47.33 OSA TREATED WITH BIPAP: Primary | ICD-10-CM

## 2025-02-10 DIAGNOSIS — G47.10 HYPERSOMNIA: ICD-10-CM

## 2025-02-10 DIAGNOSIS — E66.9 OBESITY (BMI 35.0-39.9 WITHOUT COMORBIDITY): ICD-10-CM

## 2025-02-10 PROCEDURE — 3078F DIAST BP <80 MM HG: CPT | Performed by: INTERNAL MEDICINE

## 2025-02-10 PROCEDURE — 3074F SYST BP LT 130 MM HG: CPT | Performed by: INTERNAL MEDICINE

## 2025-02-10 PROCEDURE — 1159F MED LIST DOCD IN RCRD: CPT | Performed by: INTERNAL MEDICINE

## 2025-02-10 PROCEDURE — G8427 DOCREV CUR MEDS BY ELIG CLIN: HCPCS | Performed by: INTERNAL MEDICINE

## 2025-02-10 PROCEDURE — 99214 OFFICE O/P EST MOD 30 MIN: CPT | Performed by: INTERNAL MEDICINE

## 2025-02-10 PROCEDURE — 1036F TOBACCO NON-USER: CPT | Performed by: INTERNAL MEDICINE

## 2025-02-10 PROCEDURE — 1123F ACP DISCUSS/DSCN MKR DOCD: CPT | Performed by: INTERNAL MEDICINE

## 2025-02-10 PROCEDURE — G8417 CALC BMI ABV UP PARAM F/U: HCPCS | Performed by: INTERNAL MEDICINE

## 2025-02-10 RX ORDER — TRAZODONE HYDROCHLORIDE 100 MG/1
100 TABLET ORAL NIGHTLY
COMMUNITY
Start: 2024-12-25

## 2025-02-10 RX ORDER — TADALAFIL 5 MG/1
5 TABLET ORAL DAILY
COMMUNITY
Start: 2025-02-01

## 2025-02-10 ASSESSMENT — ENCOUNTER SYMPTOMS
SHORTNESS OF BREATH: 0
COUGH: 0
EYE ITCHING: 0
ABDOMINAL DISTENTION: 0
ABDOMINAL PAIN: 0
BACK PAIN: 0
EYE DISCHARGE: 0

## 2025-02-10 NOTE — PROGRESS NOTES
Angela Vega  1940  Referring Provider: Brenden Feliciano [facility] - General     Subjective:     Chief Complaint   Patient presents with    Follow-up     Compliance        HPI  Angela is a 84 y.o. male has come back as a follow up. He has severe JAIMIE on rh BIPAP 19/15 cm h20 which he is using it every night about 7 to 8 hours. He says that it is helping him. He has 6 lb loss of weight. He has a FFM. He is not sleepy during the day time. His 2 week download data showed a residual AHI is 1.3 and leak is 96.3 L.min.    Current Outpatient Medications   Medication Sig Dispense Refill    tadalafil (CIALIS) 5 MG tablet Take 1 tablet by mouth daily      traZODone (DESYREL) 100 MG tablet Take 1 tablet by mouth nightly at bedtime.      torsemide (DEMADEX) 20 MG tablet Take 1 tablet by mouth in the morning and at bedtime 60 tablet 5    metaxalone (SKELAXIN) 800 MG tablet       montelukast (SINGULAIR) 10 MG tablet       amLODIPine (NORVASC) 5 MG tablet       potassium chloride (KLOR-CON M) 20 MEQ extended release tablet Take 1 tablet by mouth daily      dronedarone hcl (MULTAQ) 400 MG TABS Take 1 tablet by mouth 2 times daily (with meals)      CPAP Machine MISC by Does not apply route nightly      apixaban (ELIQUIS) 5 MG TABS tablet Take 1 tablet by mouth 2 times daily 60 tablet 3    rosuvastatin (CRESTOR) 20 MG tablet Take 1 tablet by mouth daily      pramipexole (MIRAPEX) 1 MG tablet Take 0.5 tablets by mouth 2 times daily Patient takes 1/2 tablet in the am and 1 1/2 tablet at night.      metoprolol (LOPRESSOR) 50 MG tablet Take 1 tablet by mouth 2 times daily      modafinil (PROVIGIL) 100 MG tablet Take 1 tablet by mouth daily.      esomeprazole (NEXIUM) 40 MG capsule Take 1 capsule by mouth 2 times daily      aspirin 81 MG chewable tablet Take 1 tablet by mouth daily      diclofenac-miSOPROStol (ARTHROTEC 75) 75-0.2 MG per tablet Take 1 tablet by mouth 2 times daily (Patient not taking: Reported on 2/10/2025)       No

## 2025-02-13 ENCOUNTER — TELEPHONE (OUTPATIENT)
Dept: CARDIOLOGY CLINIC | Age: 85
End: 2025-02-13

## 2025-02-13 NOTE — TELEPHONE ENCOUNTER
Called and advised daughter Ritika that patient has 3 months on pacer battery and to do next 30 day check on 3/15. She voiced understanding.

## 2025-03-19 PROCEDURE — 93296 REM INTERROG EVL PM/IDS: CPT | Performed by: INTERNAL MEDICINE

## 2025-03-19 PROCEDURE — 93294 REM INTERROG EVL PM/LDLS PM: CPT | Performed by: INTERNAL MEDICINE

## 2025-03-26 ENCOUNTER — OFFICE VISIT (OUTPATIENT)
Dept: CARDIOLOGY CLINIC | Age: 85
End: 2025-03-26
Payer: MEDICARE

## 2025-03-26 VITALS
BODY MASS INDEX: 33.49 KG/M2 | HEART RATE: 64 BPM | WEIGHT: 201 LBS | HEIGHT: 65 IN | SYSTOLIC BLOOD PRESSURE: 122 MMHG | DIASTOLIC BLOOD PRESSURE: 66 MMHG

## 2025-03-26 DIAGNOSIS — I10 PRIMARY HYPERTENSION: Primary | ICD-10-CM

## 2025-03-26 PROCEDURE — 99214 OFFICE O/P EST MOD 30 MIN: CPT | Performed by: INTERNAL MEDICINE

## 2025-03-26 PROCEDURE — G8427 DOCREV CUR MEDS BY ELIG CLIN: HCPCS | Performed by: INTERNAL MEDICINE

## 2025-03-26 PROCEDURE — 93000 ELECTROCARDIOGRAM COMPLETE: CPT | Performed by: INTERNAL MEDICINE

## 2025-03-26 PROCEDURE — 1159F MED LIST DOCD IN RCRD: CPT | Performed by: INTERNAL MEDICINE

## 2025-03-26 PROCEDURE — 3078F DIAST BP <80 MM HG: CPT | Performed by: INTERNAL MEDICINE

## 2025-03-26 PROCEDURE — 1036F TOBACCO NON-USER: CPT | Performed by: INTERNAL MEDICINE

## 2025-03-26 PROCEDURE — 1123F ACP DISCUSS/DSCN MKR DOCD: CPT | Performed by: INTERNAL MEDICINE

## 2025-03-26 PROCEDURE — 3074F SYST BP LT 130 MM HG: CPT | Performed by: INTERNAL MEDICINE

## 2025-03-26 PROCEDURE — G8417 CALC BMI ABV UP PARAM F/U: HCPCS | Performed by: INTERNAL MEDICINE

## 2025-03-26 NOTE — PROGRESS NOTES
CARDIOLOGY NOTE      3/26/2025    RE: Angela Vega  (1940)                               TO:  Brenden Jackson MD            CHIEF COMPLAINT   Angela is a 84 y.o. male who was seen today for management of coronary artery disease                                    HPI:                   Pt has h/o coronary artery disease, hypertension, hyperlipidemia, permanent pacemaker, atrial fibrillation, seen today for follow-up.   Angela Vega has the following history recorded in care path:  Patient Active Problem List    Diagnosis Date Noted    Reactive airway disease without complication 12/02/2022    Varicose veins of both legs with edema 11/22/2022    WD-Venous stasis ulcer of right calf with fat layer exposed (HCC) 11/09/2022    Hypersomnia 08/12/2022    Syncope and collapse 10/24/2017    Blisters of multiple sites 10/05/2016    Hematuria     Abdominal discomfort     S/P ablation of atrial fibrillation     S/P ablation of atrial flutter     Atrial fibrillation ([facility])     History of cardiac monitoring 06/01/2015    PAF (paroxysmal atrial fibrillation) ([facility])     Tachycardia     Congestive heart failure ([facility])     Pacemaker     CAD (coronary artery disease) 02/23/2015    Swelling of calf 02/18/2014    Pacer at end of battery life 05/29/2013    SSS (sick sinus syndrome) ([facility]) 05/29/2013    Claudication 05/24/2013    History of PTCA     Hyperlipidemia     Hypertension     Sleep apnea     WD-Chronic venous hypertension (idiopathic) with ulcer of bilateral lower extremity (CODE) ([facility]) 02/18/2022    WD-Non-pressure chronic ulcer of other part of left lower leg limited to breakdown of skin (HCC) 02/18/2022    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 ([facility]) 02/18/2022    Venous stasis 02/19/2020    Pulmonary hypertension ([facility]) 02/11/2019    JAIMIE treated with BiPAP 12/03/2018    Obesity (BMI 35.0-39.9 without comorbidity) 12/03/2018    SOB (shortness of breath) on

## 2025-03-26 NOTE — PATIENT INSTRUCTIONS
Thank you for allowing us to care for you today!   We want to ensure we can follow your treatment plan and we strive to give you the best outcomes and experience possible.   If you ever have a life threatening emergency and call 911 - for an ambulance (EMS)  REMEMBER  Our providers can only care for you at:   [facility] or [facility]   Even if you have someone take you or you drive yourself we can only care for you in a [facility] facility. Our providers are not setup at the other healthcare locations!    PLEASE CALL OUR OFFICE DURING NORMAL BUSINESS HOURS  Monday through Friday 8 am to 5 pm  AFTER HOURS the physician on-call cannot help with scheduling, rescheduling, procedure instruction questions or any type of medication need or issue.  [facility] P:[phone] - [facility] P:[phone] - [facility] P:[phone]      If you receive a survey:  We would appreciate you taking the time to share your experience concerning your provider visit in the office.    These surveys are confidential!  We are eager to improve and are counting on you to share your feedback so we can ensure you get the best care possible.

## 2025-03-26 NOTE — PROGRESS NOTES
CLINICAL STAFF DOCUMENTATION    Dr. Zion Vega  1940  [id number]    Have you had any Chest Pain recently? - No    Have you had any Shortness of Breath - Yes  When did it begin? - Years   If Yes - When at rest and on exertion  How many flights of stairs can you go up without having SOB? -   Are you on Oxygen during the day or at night? - No  How many liters of Oxygen are you on? -   How many pillows do you sleep with under your head? - 1    Have you had any dizziness - No    Have you had any palpitations recently? - No    Do you have any edema - swelling in legs    If Yes - CHECK TO SEE IF THE EDEMA IS PITTING  How long have they been having edema - Years  If Yes - Have they worn compression stockings Yes  If they have worn compression stockings  Years        When did you have your last labs drawn 09/23  What doctor ordered kellis   Do we have the labs in their chart Yes      If we do not have these labs, you are retrieve these labs for the provider!    Do you have a surgery or procedure scheduled in the near future - No    Do use tobacco products? - No  Do you drink alcohol? - No  Do you use any illicit drugs? - No  Caffeine? - Yesocccu  How much caffeine? .         Check medication list thoroughly!!! AND RECONCILE OUTSIDE MEDICATIONS  If dose has changed change the entire order not just the MG  BE SURE TO ASK PATIENT IF THEY NEED MEDICATION REFILLS  Verify Pharmacy and update if incorrect    Add to every patient's \"wrap up\" the following dot phrase AFTERVISITCARDIOHEARTHOUSE and ensure we explain this to our patients

## 2025-04-09 ENCOUNTER — TELEPHONE (OUTPATIENT)
Dept: CARDIOLOGY CLINIC | Age: 85
End: 2025-04-09

## 2025-04-15 ENCOUNTER — TELEPHONE (OUTPATIENT)
Dept: CARDIOLOGY CLINIC | Age: 85
End: 2025-04-15

## 2025-04-15 NOTE — TELEPHONE ENCOUNTER
Called and spoke with patients DIL. Advised her that we will not do 30 day check this month as patient has appt with Dr. Iqbal on 4/18 and at that time the physician should schedule for battery change. She agreed.

## 2025-04-18 ENCOUNTER — INITIAL CONSULT (OUTPATIENT)
Age: 85
End: 2025-04-18
Payer: MEDICARE

## 2025-04-18 VITALS
WEIGHT: 200.2 LBS | SYSTOLIC BLOOD PRESSURE: 130 MMHG | HEART RATE: 67 BPM | BODY MASS INDEX: 33.36 KG/M2 | HEIGHT: 65 IN | DIASTOLIC BLOOD PRESSURE: 68 MMHG

## 2025-04-18 DIAGNOSIS — Z45.010 PACEMAKER AT END OF BATTERY LIFE: Primary | ICD-10-CM

## 2025-04-18 DIAGNOSIS — Z98.890 S/P ABLATION OF ATRIAL FIBRILLATION: ICD-10-CM

## 2025-04-18 DIAGNOSIS — Z86.79 S/P ABLATION OF ATRIAL FIBRILLATION: ICD-10-CM

## 2025-04-18 DIAGNOSIS — I48.0 PAF (PAROXYSMAL ATRIAL FIBRILLATION) (HCC): ICD-10-CM

## 2025-04-18 PROCEDURE — 3075F SYST BP GE 130 - 139MM HG: CPT | Performed by: INTERNAL MEDICINE

## 2025-04-18 PROCEDURE — G8417 CALC BMI ABV UP PARAM F/U: HCPCS | Performed by: INTERNAL MEDICINE

## 2025-04-18 PROCEDURE — 93000 ELECTROCARDIOGRAM COMPLETE: CPT | Performed by: INTERNAL MEDICINE

## 2025-04-18 PROCEDURE — 1123F ACP DISCUSS/DSCN MKR DOCD: CPT | Performed by: INTERNAL MEDICINE

## 2025-04-18 PROCEDURE — 1159F MED LIST DOCD IN RCRD: CPT | Performed by: INTERNAL MEDICINE

## 2025-04-18 PROCEDURE — 1036F TOBACCO NON-USER: CPT | Performed by: INTERNAL MEDICINE

## 2025-04-18 PROCEDURE — 3078F DIAST BP <80 MM HG: CPT | Performed by: INTERNAL MEDICINE

## 2025-04-18 PROCEDURE — G8427 DOCREV CUR MEDS BY ELIG CLIN: HCPCS | Performed by: INTERNAL MEDICINE

## 2025-04-18 PROCEDURE — 99214 OFFICE O/P EST MOD 30 MIN: CPT | Performed by: INTERNAL MEDICINE

## 2025-04-18 NOTE — PATIENT INSTRUCTIONS
Thank you for allowing us to care for you today!   We want to ensure we can follow your treatment plan and we strive to give you the best outcomes and experience possible.   If you ever have a life threatening emergency and call 911 - for an ambulance (EMS)  REMEMBER  Our providers can only care for you at:   [facility] or [facility]   Even if you have someone take you or you drive yourself we can only care for you in a [facility] facility. Our providers are not setup at the other healthcare locations!    PLEASE CALL OUR OFFICE DURING NORMAL BUSINESS HOURS  Monday through Friday 8 am to 5 pm  AFTER HOURS the physician on-call cannot help with scheduling, rescheduling, procedure instruction questions or any type of medication need or issue.  [facility] P:[phone] - [facility] P:[phone] - [facility] P:[phone]      If you receive a survey:  We would appreciate you taking the time to share your experience concerning your provider visit in the office.    These surveys are confidential!  We are eager to improve and are counting on you to share your feedback so we can ensure you get the best care possible.

## 2025-04-18 NOTE — PROGRESS NOTES
Electrophysiology Reconsult Note          Chief complaint :  Pacemaker generator change, Shortness of breath with exertion, edema, dizziness at times    Referring physician:        Primary care physician: Brenden Feliciano MD      History of Present Illness:     This visit (4/18/2025)      Chief Complaint   Patient presents with    Consultation     PPM gen change      Shortness of Breath     SOB with moderate exertion    Dizziness at times     W/SOB    Edema     Legs and ankles bilat  Pt denies other cardiac symptoms  Pt does not drink or smoke             Previous visit:  (12/18/2015)      Chief Complaint   Patient presents with    Follow Up After Procedure     Patient is following up for afib and flutter ablation 8/11/15. He thinks he feels better since the ablation. However, other problems like arthritis and back pain are making him feel bad. He denies chest pain, diziness, and palpitations.    Fatigue     Patient states he has been very tired off and on for the last few months. He says he isn't sleeping very well d/t back pain    Shortness of Breath     Patient states he has had shortness of breath off and on for a long time.     Edema     He says that he has been having swelling in his legs on and off           Previous visit:    Chief Complaint   Patient presents with    Follow Up After Procedure     Patient is following up for afib and flutter ablation 8/11/15.  Patient has edema in his BLE; improves with elevation.  Patient denies chest pain, sob, dizziness and palpitations.  Patient is taking ASA 81 mg, Eliquis, Multaq and Lopressor.         Patient overall feels improved. No shortness of breath or palpitation any more          Past Medical History:   Diagnosis Date    Aortic regurgitation 04/03/2012    4/3/2012-mild; 2/7/2011-mild to moderate    Arrhythmia 02/22/2011 2/22/2011-tachycardia episode during cardiac cath-48 HR Holter monitor normal

## 2025-05-05 ENCOUNTER — TELEPHONE (OUTPATIENT)
Dept: CARDIOLOGY CLINIC | Age: 85
End: 2025-05-05

## 2025-05-05 NOTE — TELEPHONE ENCOUNTER
Called patient, no ans lm for patient to call back. Unable to speak with DIL d/t not having HIPAA form.

## 2025-05-05 NOTE — TELEPHONE ENCOUNTER
GEOVANI called back and I advised that I can not speak with her d/t no HIPAA form on file. GEOVANI upset that I could not discuss with her. Advised her of HIPAA laws. Also dicussed with Jayla CHACON and advised no updated form since 2017.

## 2025-05-06 ASSESSMENT — ENCOUNTER SYMPTOMS
CHEST TIGHTNESS: 0
ABDOMINAL PAIN: 0
TROUBLE SWALLOWING: 0
NAUSEA: 0
SHORTNESS OF BREATH: 1
BLOOD IN STOOL: 0

## 2025-05-07 NOTE — TELEPHONE ENCOUNTER
Called patient and patient verbally gave me ok to speak with roger Yost. Called Ritika and advised patient is getting monthly checks on his PPM and that Mandie will advise me once patients PPM reach's LEONARDO. Ritika stated understanding.

## 2025-05-08 ENCOUNTER — TELEPHONE (OUTPATIENT)
Age: 85
End: 2025-05-08

## 2025-05-08 NOTE — TELEPHONE ENCOUNTER
Spoke with patient/DIL and scheduled 1 month office visit follow up with Simin Kerns NP on 5/23/25 @ 9143.    Patient advised understanding.

## 2025-05-08 NOTE — TELEPHONE ENCOUNTER
LM for Patient requesting call back to schedule follow up after 5/21/25.     Will try again later to contact patient.

## 2025-05-23 ENCOUNTER — OFFICE VISIT (OUTPATIENT)
Age: 85
End: 2025-05-23
Payer: MEDICARE

## 2025-05-23 VITALS
HEIGHT: 66 IN | DIASTOLIC BLOOD PRESSURE: 56 MMHG | WEIGHT: 196 LBS | SYSTOLIC BLOOD PRESSURE: 118 MMHG | HEART RATE: 68 BPM | BODY MASS INDEX: 31.5 KG/M2

## 2025-05-23 DIAGNOSIS — Z86.79 S/P ABLATION OF ATRIAL FIBRILLATION: ICD-10-CM

## 2025-05-23 DIAGNOSIS — I27.20 PULMONARY HYPERTENSION (HCC): ICD-10-CM

## 2025-05-23 DIAGNOSIS — I48.0 PAF (PAROXYSMAL ATRIAL FIBRILLATION) (HCC): ICD-10-CM

## 2025-05-23 DIAGNOSIS — Z98.890 S/P ABLATION OF ATRIAL FIBRILLATION: ICD-10-CM

## 2025-05-23 DIAGNOSIS — Z86.79 S/P ABLATION OF ATRIAL FLUTTER: ICD-10-CM

## 2025-05-23 DIAGNOSIS — I87.8 VENOUS STASIS: ICD-10-CM

## 2025-05-23 DIAGNOSIS — Z45.010 ENCOUNTER FOR PACEMAKER AT END OF BATTERY LIFE: Primary | ICD-10-CM

## 2025-05-23 DIAGNOSIS — I10 PRIMARY HYPERTENSION: ICD-10-CM

## 2025-05-23 DIAGNOSIS — I25.10 CORONARY ARTERY DISEASE INVOLVING NATIVE CORONARY ARTERY OF NATIVE HEART WITHOUT ANGINA PECTORIS: ICD-10-CM

## 2025-05-23 DIAGNOSIS — E78.2 MIXED HYPERLIPIDEMIA: ICD-10-CM

## 2025-05-23 DIAGNOSIS — Z98.890 S/P ABLATION OF ATRIAL FLUTTER: ICD-10-CM

## 2025-05-23 DIAGNOSIS — E66.9 OBESITY (BMI 35.0-39.9 WITHOUT COMORBIDITY): ICD-10-CM

## 2025-05-23 PROCEDURE — G8427 DOCREV CUR MEDS BY ELIG CLIN: HCPCS

## 2025-05-23 PROCEDURE — 1159F MED LIST DOCD IN RCRD: CPT

## 2025-05-23 PROCEDURE — G8417 CALC BMI ABV UP PARAM F/U: HCPCS

## 2025-05-23 PROCEDURE — 99214 OFFICE O/P EST MOD 30 MIN: CPT

## 2025-05-23 PROCEDURE — 1036F TOBACCO NON-USER: CPT

## 2025-05-23 PROCEDURE — 1123F ACP DISCUSS/DSCN MKR DOCD: CPT

## 2025-05-23 ASSESSMENT — ENCOUNTER SYMPTOMS
BLOOD IN STOOL: 0
TROUBLE SWALLOWING: 0
ABDOMINAL PAIN: 0
NAUSEA: 0
SHORTNESS OF BREATH: 0

## 2025-05-23 NOTE — PROGRESS NOTES
have chronic mild shortness of breath with exertion.  Does follow with pulmonology regularly for history of JAIMIE.  Patient endorses compliance with cardiac medication regimen, reviewed medications individually  Continue diet and lifestyle modifications for obesity, has had decrease in BMI from 33-31      Pacemaker interrogation report showed AS-VS pacing 1.9% of the time, AS- pacing less than 0.1% of the time, AP-VS pacing 97.9% of the time, AP- pacing 0.1% of the time.  PVC singles 148, PVC runs 4, PAC runs 50.    The risks including, but not limited to, the risks of vascular injury, bleeding, infection, device malfunction, lead dislodgement, radiation exposure, injury to cardiac and surrounding structures (including pneumothorax), stroke, myocardial infarction and death were discussed in detail. The patient opted to proceed with the device implantation.            Simin Kerns, APRN - CNP

## 2025-05-29 ENCOUNTER — TELEPHONE (OUTPATIENT)
Dept: CARDIOLOGY CLINIC | Age: 85
End: 2025-05-29

## 2025-05-29 NOTE — TELEPHONE ENCOUNTER
Pt daughter in law called to schedule apt for a pacer battery replacement.states pt was seen on 5/23 and was told we would call for apt.

## 2025-05-29 NOTE — TELEPHONE ENCOUNTER
Patients daughter Ritika called back asking to change PPW time, we changed time to 6/24/25 @ 300pm.    Patients daughter advised understanding.

## 2025-05-29 NOTE — TELEPHONE ENCOUNTER
Spoke to Patients daughter in law and informed her of procedure dates/times with Dr. Iqbal.       Ritika PENA Advised understanding.

## 2025-05-29 NOTE — TELEPHONE ENCOUNTER
LM for Patients daughter, requesting call back to inform of procedure dates and times.    PPW: 6/24 @ 1133  PPM Gen Change: 6/26 @ 6978    Will try again to contact Patient/Daughter.

## 2025-05-30 DIAGNOSIS — I48.0 PAF (PAROXYSMAL ATRIAL FIBRILLATION) (HCC): ICD-10-CM

## 2025-05-30 DIAGNOSIS — Z45.010 PACEMAKER AT END OF BATTERY LIFE: Primary | ICD-10-CM

## 2025-06-24 ENCOUNTER — HOSPITAL ENCOUNTER (OUTPATIENT)
Age: 85
Discharge: HOME | End: 2025-06-24
Payer: MEDICARE

## 2025-06-24 ENCOUNTER — HOSPITAL ENCOUNTER (OUTPATIENT)
Dept: GENERAL RADIOLOGY | Age: 85
Discharge: HOME | End: 2025-06-24
Payer: MEDICARE

## 2025-06-24 ENCOUNTER — CLINICAL SUPPORT (OUTPATIENT)
Age: 85
End: 2025-06-24

## 2025-06-24 DIAGNOSIS — Z45.010 PACEMAKER AT END OF BATTERY LIFE: ICD-10-CM

## 2025-06-24 DIAGNOSIS — I48.0 PAF (PAROXYSMAL ATRIAL FIBRILLATION) (HCC): ICD-10-CM

## 2025-06-24 DIAGNOSIS — Z45.010 ENCOUNTER FOR PACEMAKER AT END OF BATTERY LIFE: Primary | ICD-10-CM

## 2025-06-24 LAB
ANION GAP SERPL CALCULATED.3IONS-SCNC: 13 MMOL/L (ref 9–17)
BUN SERPL-MCNC: 21 MG/DL (ref 7–20)
CALCIUM SERPL-MCNC: 9.1 MG/DL (ref 8.3–10.6)
CHLORIDE SERPL-SCNC: 102 MMOL/L (ref 99–110)
CO2 SERPL-SCNC: 27 MMOL/L (ref 21–32)
CREAT SERPL-MCNC: 1.4 MG/DL (ref 0.8–1.3)
ERYTHROCYTE [DISTWIDTH] IN BLOOD BY AUTOMATED COUNT: 13.7 % (ref 11.7–14.9)
GFR, ESTIMATED: 50 ML/MIN/1.73M2
GLUCOSE SERPL-MCNC: 85 MG/DL (ref 74–99)
HCT VFR BLD AUTO: 33.3 % (ref 42–52)
HGB BLD-MCNC: 10.6 G/DL (ref 13.5–18)
INR PPP: 1.3
MAGNESIUM SERPL-MCNC: 2.3 MG/DL (ref 1.8–2.4)
MCH RBC QN AUTO: 28.3 PG (ref 27–31)
MCHC RBC AUTO-ENTMCNC: 31.8 G/DL (ref 32–36)
MCV RBC AUTO: 89 FL (ref 78–100)
PARTIAL THROMBOPLASTIN TIME: 34.3 SEC (ref 25.1–37.1)
PHOSPHATE SERPL-MCNC: 2.5 MG/DL (ref 2.5–4.9)
PLATELET # BLD AUTO: 226 K/UL (ref 140–440)
PMV BLD AUTO: 10.1 FL (ref 7.5–11.1)
POTASSIUM SERPL-SCNC: 3.2 MMOL/L (ref 3.5–5.1)
PROTHROMBIN TIME: 16.6 SEC (ref 11.7–14.5)
RBC # BLD AUTO: 3.74 M/UL (ref 4.6–6.2)
SODIUM SERPL-SCNC: 141 MMOL/L (ref 136–145)
WBC OTHER # BLD: 8.4 K/UL (ref 4–10.5)

## 2025-06-24 PROCEDURE — 80048 BASIC METABOLIC PNL TOTAL CA: CPT

## 2025-06-24 PROCEDURE — 84100 ASSAY OF PHOSPHORUS: CPT

## 2025-06-24 PROCEDURE — 71046 X-RAY EXAM CHEST 2 VIEWS: CPT

## 2025-06-24 PROCEDURE — 85730 THROMBOPLASTIN TIME PARTIAL: CPT

## 2025-06-24 PROCEDURE — 83735 ASSAY OF MAGNESIUM: CPT

## 2025-06-24 PROCEDURE — 85610 PROTHROMBIN TIME: CPT

## 2025-06-24 PROCEDURE — 85027 COMPLETE CBC AUTOMATED: CPT

## 2025-06-24 NOTE — PROGRESS NOTES
Patient here in office and educated on 06/24/25, scheduled for Generator Change Dual Chamber Pacemaker on 06/26/25 @ 1430, with arrival @ 1230, @ [facility]; consents signed. Copy of orders given for labs and CXR due 06/24/25 at [facility], [facility] or [facility]. Instructions given to patient to :NPO after midnight including water the night before procedure. May take rest of morning medications day of procedure except the following; Hold Torsemide(Demadex) the morning of the procedure. Hold Eliquis the evening dose night before procedure on 06/25/25 and morning dose of the procedure 06/26/25.  Advised patient to plan for an overnight stay in the hospital. Patient voiced understanding. Copies of consent & info scanned in chart.

## 2025-06-26 ENCOUNTER — HOSPITAL ENCOUNTER (OUTPATIENT)
Age: 85
Setting detail: OUTPATIENT SURGERY
Discharge: HOME | End: 2025-06-26
Attending: INTERNAL MEDICINE | Admitting: INTERNAL MEDICINE
Payer: MEDICARE

## 2025-06-26 VITALS
DIASTOLIC BLOOD PRESSURE: 51 MMHG | SYSTOLIC BLOOD PRESSURE: 116 MMHG | WEIGHT: 196 LBS | HEIGHT: 66 IN | TEMPERATURE: 98.4 F | OXYGEN SATURATION: 95 % | HEART RATE: 60 BPM | RESPIRATION RATE: 12 BRPM | BODY MASS INDEX: 31.5 KG/M2

## 2025-06-26 DIAGNOSIS — Z45.010 PACEMAKER AT END OF BATTERY LIFE: ICD-10-CM

## 2025-06-26 LAB — ECHO BSA: 2.03 M2

## 2025-06-26 PROCEDURE — 7100000010 HC PHASE II RECOVERY - FIRST 15 MIN: Performed by: INTERNAL MEDICINE

## 2025-06-26 PROCEDURE — 6370000000 HC RX 637 (ALT 250 FOR IP): Performed by: INTERNAL MEDICINE

## 2025-06-26 PROCEDURE — 99152 MOD SED SAME PHYS/QHP 5/>YRS: CPT | Performed by: INTERNAL MEDICINE

## 2025-06-26 PROCEDURE — 6360000002 HC RX W HCPCS

## 2025-06-26 PROCEDURE — 2709999900 HC NON-CHARGEABLE SUPPLY: Performed by: INTERNAL MEDICINE

## 2025-06-26 PROCEDURE — 6360000002 HC RX W HCPCS: Performed by: INTERNAL MEDICINE

## 2025-06-26 PROCEDURE — 7100000011 HC PHASE II RECOVERY - ADDTL 15 MIN: Performed by: INTERNAL MEDICINE

## 2025-06-26 PROCEDURE — 6360000004 HC RX CONTRAST MEDICATION

## 2025-06-26 PROCEDURE — 33228 REMV&REPLC PM GEN DUAL LEAD: CPT | Performed by: INTERNAL MEDICINE

## 2025-06-26 PROCEDURE — C1785 PMKR, DUAL, RATE-RESP: HCPCS | Performed by: INTERNAL MEDICINE

## 2025-06-26 PROCEDURE — 99153 MOD SED SAME PHYS/QHP EA: CPT | Performed by: INTERNAL MEDICINE

## 2025-06-26 PROCEDURE — C1889 IMPLANT/INSERT DEVICE, NOC: HCPCS | Performed by: INTERNAL MEDICINE

## 2025-06-26 DEVICE — IPG W1DR01 AZURE XT DR MRI USA
Type: IMPLANTABLE DEVICE | Status: FUNCTIONAL
Brand: AZURE™ XT DR MRI SURESCAN™

## 2025-06-26 DEVICE — ENVELOPE CMRM6122 ABSORB MED MR
Type: IMPLANTABLE DEVICE | Status: FUNCTIONAL
Brand: TYRX™

## 2025-06-26 RX ORDER — TRAMADOL HYDROCHLORIDE 50 MG/1
50 TABLET ORAL EVERY 6 HOURS PRN
Status: DISCONTINUED | OUTPATIENT
Start: 2025-06-26 | End: 2025-06-26 | Stop reason: HOSPADM

## 2025-06-26 RX ORDER — ACETAMINOPHEN 325 MG/1
650 TABLET ORAL EVERY 6 HOURS PRN
Status: DISCONTINUED | OUTPATIENT
Start: 2025-06-26 | End: 2025-06-26 | Stop reason: HOSPADM

## 2025-06-26 RX ORDER — SODIUM CHLORIDE 0.9 % (FLUSH) 0.9 %
5-40 SYRINGE (ML) INJECTION EVERY 12 HOURS SCHEDULED
Status: DISCONTINUED | OUTPATIENT
Start: 2025-06-26 | End: 2025-06-26 | Stop reason: HOSPADM

## 2025-06-26 RX ORDER — SODIUM CHLORIDE 0.9 % (FLUSH) 0.9 %
5-40 SYRINGE (ML) INJECTION PRN
Status: DISCONTINUED | OUTPATIENT
Start: 2025-06-26 | End: 2025-06-26 | Stop reason: HOSPADM

## 2025-06-26 RX ORDER — CEFAZOLIN SODIUM 1 G/3ML
INJECTION, POWDER, FOR SOLUTION INTRAMUSCULAR; INTRAVENOUS PRN
Status: DISCONTINUED | OUTPATIENT
Start: 2025-06-26 | End: 2025-06-26 | Stop reason: HOSPADM

## 2025-06-26 RX ORDER — POTASSIUM CHLORIDE 1500 MG/1
40 TABLET, EXTENDED RELEASE ORAL ONCE
Status: COMPLETED | OUTPATIENT
Start: 2025-06-26 | End: 2025-06-26

## 2025-06-26 RX ORDER — FENTANYL CITRATE 50 UG/ML
INJECTION, SOLUTION INTRAMUSCULAR; INTRAVENOUS PRN
Status: DISCONTINUED | OUTPATIENT
Start: 2025-06-26 | End: 2025-06-26 | Stop reason: HOSPADM

## 2025-06-26 RX ORDER — MIDAZOLAM HYDROCHLORIDE 1 MG/ML
INJECTION, SOLUTION INTRAMUSCULAR; INTRAVENOUS PRN
Status: DISCONTINUED | OUTPATIENT
Start: 2025-06-26 | End: 2025-06-26 | Stop reason: HOSPADM

## 2025-06-26 RX ORDER — SODIUM CHLORIDE 9 MG/ML
INJECTION, SOLUTION INTRAVENOUS PRN
Status: DISCONTINUED | OUTPATIENT
Start: 2025-06-26 | End: 2025-06-26 | Stop reason: HOSPADM

## 2025-06-26 RX ADMIN — POTASSIUM CHLORIDE 40 MEQ: 1500 TABLET, EXTENDED RELEASE ORAL at 12:53

## 2025-06-26 ASSESSMENT — ENCOUNTER SYMPTOMS
BLOOD IN STOOL: 0
NAUSEA: 0
TROUBLE SWALLOWING: 0
ABDOMINAL PAIN: 0
SHORTNESS OF BREATH: 0

## 2025-06-26 NOTE — H&P
pain, palpitations and leg swelling.   Gastrointestinal:  Negative for abdominal pain, blood in stool and nausea.   Genitourinary:  Negative for dysuria and flank pain.   Musculoskeletal:  Positive for arthralgias and neck pain.        Chronic back and neck pain   Neurological:  Negative for dizziness and syncope.   Psychiatric/Behavioral:  Negative for agitation and confusion.           Physical Examination:      Vitals:    06/26/25 1246   BP: (!) 135/59   Resp: 18   Temp: 98.4 °F (36.9 °C)   TempSrc: Temporal   SpO2: 95%   Weight: 88.9 kg (196 lb)   Height: 1.676 m (5' 6\")          There were no vitals taken for this visit.   Wt Readings from Last 3 Encounters:   05/23/25 88.9 kg (196 lb)   04/18/25 90.8 kg (200 lb 3.2 oz)   03/26/25 91.2 kg (201 lb)     There is no height or weight on file to calculate BMI.    Physical Exam  Constitutional:       Appearance: Normal appearance. He is not ill-appearing.   HENT:      Head: Normocephalic and atraumatic.      Right Ear: External ear normal.      Left Ear: External ear normal.      Mouth/Throat:      Mouth: Mucous membranes are moist.   Eyes:      Extraocular Movements: Extraocular movements intact.   Cardiovascular:      Rate and Rhythm: Normal rate and regular rhythm.      Pulses: Normal pulses.      Heart sounds: Murmur (grade 2/6 systolic murmur) heard.   Pulmonary:      Effort: Pulmonary effort is normal.      Breath sounds: No rales.   Abdominal:      General: Abdomen is flat.      Palpations: Abdomen is soft.   Musculoskeletal:         General: No tenderness. Normal range of motion.      Cervical back: Normal range of motion.      Right lower leg: No edema (1+).      Left lower leg: No edema (1+).      Comments: Wearing compression stockings, no edema noted   Skin:     General: Skin is warm and dry.   Neurological:      General: No focal deficit present.      Mental Status: He is alert and oriented to person, place, and time.   Psychiatric:         Mood and  insufficiency, wearing compression stockings daily  Hypercoagulable secondary to atrial fibrillation, maintained on Eliquis 5 mg twice daily and daily aspirin  JAIMIE and hypersomnia, on nightly BiPAP and modafinil per pulmonology  Pulmonary hypertension, on daily tadalafil    Pacemaker at end of battery life - recommend generator change  The risks including, but not limited to, the risks of vascular injury, bleeding, infection, device malfunction, lead dislodgement, radiation exposure, injury to cardiac and surrounding structures (including pneumothorax), stroke, myocardial infarction and death were discussed in detail. The patient opted to proceed with the device implantation.            Vincent Iqbal MD

## 2025-06-26 NOTE — DISCHARGE INSTRUCTIONS
1. Avoid raising the arm above shoulder for 4 weeks  2. No driving for 10 days  3. No lifting more than 10 lbs with the left hand  5. Follow up appointment with Doctor for wound check in 10 days  6. Notify Doctor if pain, fever, chills, swelling or bleeding in the surgical area  7. Please avoid sleeping on the surgical side.   8. Please do not allow anyone other than Dr Iqbal's staff to remove or redress the surgical site. If the dressing were to fall off or fall partially off before the 10 day follow up appointment, please call the office ASAP.

## 2025-06-26 NOTE — PROGRESS NOTES
Left verbal ok to one additional OT visit on cell # for Makenzie Spikes with [facility]. Pt taken via wheelchair to private vehicle.

## 2025-06-30 ENCOUNTER — TELEPHONE (OUTPATIENT)
Dept: CARDIOLOGY CLINIC | Age: 85
End: 2025-06-30

## 2025-06-30 NOTE — TELEPHONE ENCOUNTER
[place] for Patients Ritika eckert requesting call back to scheduled 10 day site check nurse visit and 1 month office visit follow up.    Will try again later to contact Patient/ Daughter to schedule appointments.

## 2025-06-30 NOTE — TELEPHONE ENCOUNTER
Spoke with Patients Daughter (in law) and rescheduled 10 day site check to 7/8/25 @ 900am nurse visit and 1 month office visit follow up with Maira Douglas NP on 8/6/25 @ 400pm.    Patients daughter (in law) Ritika advised understanding.

## 2025-07-08 ENCOUNTER — CLINICAL SUPPORT (OUTPATIENT)
Dept: CARDIOLOGY CLINIC | Age: 85
End: 2025-07-08

## 2025-07-08 VITALS — TEMPERATURE: 98.2 F

## 2025-07-08 DIAGNOSIS — Z95.0 STATUS POST PLACEMENT OF CARDIAC PACEMAKER: Primary | ICD-10-CM

## 2025-07-08 PROCEDURE — 93296 REM INTERROG EVL PM/IDS: CPT | Performed by: INTERNAL MEDICINE

## 2025-07-08 PROCEDURE — 99024 POSTOP FOLLOW-UP VISIT: CPT | Performed by: INTERNAL MEDICINE

## 2025-07-08 PROCEDURE — 93294 REM INTERROG EVL PM/LDLS PM: CPT | Performed by: INTERNAL MEDICINE

## 2025-07-08 NOTE — PROGRESS NOTES
Patient seen for site check post pacer implant. Dressing removed. No signs of inflammation or infection noted.   Edges well approximated. Patient has no complaints of pain or discomfort. Single steri strip applied. Patient instructed to not lift arm higher than shoulder level, not to sleep on side of implanted device and to not lift anything heavier than a gallon of milk for 30 days after implant. Patient given [employer] monitor for home device checks. I explained its use to patient.

## 2025-08-06 ENCOUNTER — OFFICE VISIT (OUTPATIENT)
Age: 85
End: 2025-08-06

## 2025-08-06 VITALS
BODY MASS INDEX: 30.98 KG/M2 | SYSTOLIC BLOOD PRESSURE: 138 MMHG | DIASTOLIC BLOOD PRESSURE: 60 MMHG | HEART RATE: 75 BPM | HEIGHT: 66 IN | WEIGHT: 192.8 LBS

## 2025-08-06 DIAGNOSIS — Z95.0 PACEMAKER: Primary | ICD-10-CM

## 2025-08-06 PROCEDURE — 99024 POSTOP FOLLOW-UP VISIT: CPT | Performed by: NURSE PRACTITIONER

## (undated) DEVICE — SUTURE ETHIBOND EXCEL SZ 0 L30IN NONABSORBABLE GRN CT1 L36MM X424H

## (undated) DEVICE — Device

## (undated) DEVICE — SUTURE VICRYL SZ 4-0 L18IN ABSRB UD L16MM PS-4 1/2 CIR PRIM J507G